# Patient Record
Sex: FEMALE | Race: WHITE | NOT HISPANIC OR LATINO | Employment: STUDENT | URBAN - METROPOLITAN AREA
[De-identification: names, ages, dates, MRNs, and addresses within clinical notes are randomized per-mention and may not be internally consistent; named-entity substitution may affect disease eponyms.]

---

## 2023-03-30 ENCOUNTER — OFFICE VISIT (OUTPATIENT)
Dept: OBGYN CLINIC | Facility: CLINIC | Age: 23
End: 2023-03-30

## 2023-03-30 VITALS
BODY MASS INDEX: 26.22 KG/M2 | DIASTOLIC BLOOD PRESSURE: 68 MMHG | WEIGHT: 148 LBS | SYSTOLIC BLOOD PRESSURE: 116 MMHG | HEIGHT: 63 IN

## 2023-03-30 DIAGNOSIS — Z01.419 GYNECOLOGIC EXAM NORMAL: Primary | ICD-10-CM

## 2023-03-30 PROBLEM — B96.89 BACTERIAL SINUSITIS: Status: RESOLVED | Noted: 2017-10-12 | Resolved: 2023-03-30

## 2023-03-30 PROBLEM — J02.9 ACUTE VIRAL PHARYNGITIS: Status: RESOLVED | Noted: 2019-08-30 | Resolved: 2023-03-30

## 2023-03-30 PROBLEM — J32.9 BACTERIAL SINUSITIS: Status: RESOLVED | Noted: 2017-10-12 | Resolved: 2023-03-30

## 2023-03-30 NOTE — ASSESSMENT & PLAN NOTE
Pap guidelines reviewed  Pap with reflex done today  Reviewed self breast exams today  Reviewed conception with patient in length  Recommend starting prenatal vitamin with folic acid 1-3 months prior to conception to decrease risk of neural tube defects such as spina bifida  Reviewed intercourse every other day around ovulation  Reviewed most couples take up to 6-9 months to conceive  If unable to conceive on own after 1 year of trying recommend returning to office for evaluation  Also recommend evaluation if menses continue to be irregular  Encouraged patient to take another pregnancy test with being a week late for menses  Reviewed endometriosis can run in families but reassured patient that she is not showing signs of endometriosis at this time  Menses are tolerable and only painful 1-2x's a year  Continue to monitor  Reviewed s/s to look out for  Return to office for annual or as needed

## 2023-04-03 ENCOUNTER — TELEPHONE (OUTPATIENT)
Dept: OBGYN CLINIC | Facility: CLINIC | Age: 23
End: 2023-04-03

## 2023-04-03 NOTE — TELEPHONE ENCOUNTER
4/3/2023 patient has an upcoming [address] appointment 4/26/23 12:00 nurse and 12:40 with Dr Efrain Bailon  This is patient's first pregnancy, she is a current patient of ours so no MR needed

## 2023-04-04 LAB
CYTOLOGIST CVX/VAG CYTO: ABNORMAL
DX ICD CODE: ABNORMAL
DX ICD CODE: ABNORMAL
OTHER STN SPEC: ABNORMAL
OTHER STN SPEC: ABNORMAL
PATH REPORT.FINAL DX SPEC: ABNORMAL
PATHOLOGIST CVX/VAG CYTO: ABNORMAL
SL AMB NOTE:: ABNORMAL
SL AMB SPECIMEN ADEQUACY: ABNORMAL
SL AMB TEST METHODOLOGY: ABNORMAL

## 2023-04-05 PROBLEM — R87.612 LGSIL OF CERVIX OF UNDETERMINED SIGNIFICANCE: Status: ACTIVE | Noted: 2023-04-05

## 2023-04-26 ENCOUNTER — INITIAL PRENATAL (OUTPATIENT)
Dept: OBGYN CLINIC | Facility: CLINIC | Age: 23
End: 2023-04-26

## 2023-04-26 VITALS
BODY MASS INDEX: 26.4 KG/M2 | HEIGHT: 63 IN | WEIGHT: 149 LBS | SYSTOLIC BLOOD PRESSURE: 118 MMHG | DIASTOLIC BLOOD PRESSURE: 70 MMHG

## 2023-04-26 DIAGNOSIS — Q24.9 MATERNAL CONGENITAL HEART DISEASE, ANTEPARTUM, FIRST TRIMESTER: ICD-10-CM

## 2023-04-26 DIAGNOSIS — Q24.9 MATERNAL CONGENITAL HEART DISEASE, ANTEPARTUM, FIRST TRIMESTER: Primary | ICD-10-CM

## 2023-04-26 DIAGNOSIS — Z36.89 ENCOUNTER FOR OTHER SPECIFIED ANTENATAL SCREENING: Primary | ICD-10-CM

## 2023-04-26 DIAGNOSIS — O99.411 MATERNAL CONGENITAL HEART DISEASE, ANTEPARTUM, FIRST TRIMESTER: ICD-10-CM

## 2023-04-26 DIAGNOSIS — R87.612 LGSIL OF CERVIX OF UNDETERMINED SIGNIFICANCE: ICD-10-CM

## 2023-04-26 DIAGNOSIS — Z36.87 UNSURE OF LMP (LAST MENSTRUAL PERIOD) AS REASON FOR ULTRASOUND SCAN: ICD-10-CM

## 2023-04-26 DIAGNOSIS — O99.411 MATERNAL CONGENITAL HEART DISEASE, ANTEPARTUM, FIRST TRIMESTER: Primary | ICD-10-CM

## 2023-04-26 DIAGNOSIS — Z3A.09 9 WEEKS GESTATION OF PREGNANCY: ICD-10-CM

## 2023-04-26 PROBLEM — Z01.419 GYNECOLOGIC EXAM NORMAL: Status: RESOLVED | Noted: 2023-03-30 | Resolved: 2023-04-26

## 2023-04-26 LAB
EXTERNAL CHLAMYDIA SCREEN: NEGATIVE
EXTERNAL GONORRHEA SCREEN: NEGATIVE
SL AMB  POCT GLUCOSE, UA: NEGATIVE
SL AMB POCT URINE PROTEIN: NEGATIVE

## 2023-04-26 NOTE — PROGRESS NOTES
Routine Prenatal Visit  [address] [facility], [address], [facility], [address] [facility]    Assessment/Plan:  Giuseppe Cat is a 20y o  year old  at Unknown who presents for routine prenatal visit  1  Maternal congenital heart disease, antepartum, first trimester    2  9 weeks gestation of pregnancy  Assessment & Plan:  U/S consistent with LMP  3  Unsure of LMP (last menstrual period) as reason for ultrasound scan  -     AMB US OB < 14 weeks single or first gestation level 1    4  LGSIL of cervix of undetermined significance  Assessment & Plan:  Discussed colposcopy recommendation  Explained that it is safe in pregnancy and offered to schedule  Should she decline will follow in the PP period  Subjective:     CC: Prenatal care    Raghavendra Richard is a 20 y o   female who presents for routine prenatal care at Unknown  Pregnancy ROS: no leakage of fluid, pelvic pain, or vaginal bleeding  no fetal movement  The following portions of the patient's history were reviewed and updated as appropriate: allergies, current medications, past family history, past medical history, obstetric history, gynecologic history, past social history, past surgical history and problem list       Objective:  LMP 2023 (Exact Date)   Pregravid Weight/BMI: 67 1 kg (148 lb) (BMI 26 22)  Current Weight:     Total Weight Gain: 0 454 kg (1 lb)        General: Well appearing, no distress  Respiratory: Unlabored breathing  Cardiovascular: Regular rate  Abdomen: Soft, gravid, nontender  Fundal Height: Appropriate for gestational age  Extremities: Warm and well perfused  Non tender

## 2023-04-26 NOTE — ASSESSMENT & PLAN NOTE
Discussed colposcopy recommendation  Explained that it is safe in pregnancy and offered to schedule  Should she decline will follow in the PP period

## 2023-04-26 NOTE — PROGRESS NOTES
OB INTAKE INTERVIEW  Patient is 20 y  o who presents for OB intake at 9wks  She is accompanied by: Spouse  The father of her baby Cuca Grigsby) is involved in the pregnancy    Last Menstrual Period: 2023  Ultrasound: Measured 9 weeks 0 days on 2023  Estimated Date of Delivery: 2023 confirmed by ultrasound  Signs/Symptoms of Pregnancy  Current pregnancy symptoms: Nausea, breast soreness, fatigue  Constipation no  Headaches no  Cramping/spotting no  PICA cravings no    Diabetes-  Body mass index is 26 39 kg/m²  If patient has 1 or more, please order early 1 hour GTT  History of GDM no  BMI >30 no  History of PCOS or current metformin use no  History of LGA/macrosomic infant (4000g/9lbs) no    If patient has 2 or more, please order early 1 hour GTT  AMA no  First degree relative with type 2 diabetes no  History of chronic HTN, hyperlipidemia, elevated A1C no  High risk race (, , ,  or ) no    Hypertension- if you answer yes, please order preeclampsia labs (cbc, comprehensive metabolic panel, urine protein creatinine ratio, uric acid)  History of of chronic HTN no  History of gestational HTN no  History of preeclampsia, eclampsia, or HELLP syndrome no  History of diabetes no  History of lupus, autoimmune disease, kidney disease, antiphospholipid syndrome, rheumatoid arthritis, sjogren's syndrome no    Thyroid- if yes order TSH with reflex T4 (Unless TSH value on file within last 4 weeks then do not need to order)  History of thyroid disease no    Bleeding Disorder or Hx of DVT-patient or first degree relative with history of  Order the following if not done previously     (Factor V, antithrombin III, prothrombin gene mutation, protein C and S Ag, lupus anticoagulant, anticardiolipin, beta-2 glycoprotein)   no    OB/GYN-  History of abnormal pap smear YES  History of HPV no  History of Herpes/HSV no  History of other STI (gonorrhea, chlamydia, trich) (or current partner with Hep B, Hep C, or HIV) no  History of prior  no  History of prior  no  History of  delivery prior to 36 weeks 6 days no  History of blood transfusion no  Ok for blood transfusion - Yes    Substance screening-   History of tobacco use no  Currently using tobacco no  Currently using alcohol no  Presently using drugs no  Past drug use (if yes, order urine drug screening panel)  no  IV drug use (if yes, order urine drug screening panel) no  Partner drug use (if yes, order urine drug screening panel) no  Parent/Family drug use (do not order urine drug screening panel just for family hx) no    Depression Screening-  PHQ-9 Score: (4)    MRSA Screening-   Does the pt have a hx of MRSA? no  If yes- please follow MRSA protocol and obtain a nasal swab for MRSA culture at 12 week visit  Immunizations:  Influenza vaccine given this season (ask date) -No  Discussed Tdap vaccine (ask date) -Yes  Discussed COVID Vaccine (request pt upload card or bring to next visit) -Yes, not vaccinated  Genetic/MFM-  Do you or your partner have a history of any of the following in yourselves or first degree relatives? Cystic fibrosis no  Spinal muscular atrophy no  Hemoglobinopathy/Sickle Cell/Thalassemia no  Fragile X Intellectual Disability no    If yes, discuss carrier screening and recommend consultation with MFM/genetic counseling  If no, discuss option for carrier screening and/or genetic testing with Nuchal Ultrasound  Patient interested -Patient interested in NIPT  Declined CF and SMA carrier screening     Appointment at [place] [id number] made Patient will call to schedule    Interview education  [facility] Pregnancy Essentials Book reviewed and discussed -Yes      Prenatal lab work scripts -Yes    Extra labs ordered:  -None     The patient has a history now or in prior pregnancy notable for:    None    Details that I feel the provider should be aware of: Ventricular septal defect The patient was oriented to our practice, reviewed delivering physicians and [place] in [facility]  All questions were answered      Interviewed by: Carmen Jama RN

## 2023-04-28 LAB
C TRACH RRNA SPEC QL NAA+PROBE: NEGATIVE
N GONORRHOEA RRNA SPEC QL NAA+PROBE: NEGATIVE

## 2023-05-01 LAB
EXTERNAL ANTIBODY SCREEN: NORMAL
EXTERNAL HEMATOCRIT: 44.9 %
EXTERNAL HEMOGLOBIN: 15.9 G/DL
EXTERNAL HEPATITIS B SURFACE ANTIGEN: NEGATIVE
EXTERNAL HIV-1/2 AB-AG: NORMAL
EXTERNAL PLATELET COUNT: 263 K/ÂΜL
EXTERNAL RH FACTOR: POSITIVE
EXTERNAL RUBELLA IGG QUANTITATION: NORMAL
EXTERNAL SYPHILIS TOTAL IGG/IGM SCREENING: NEGATIVE

## 2023-05-03 LAB
ABO GROUP BLD: ABNORMAL
APPEARANCE UR: CLEAR
BACTERIA UR CULT: ABNORMAL
BACTERIA UR CULT: NO GROWTH
BACTERIA URNS QL MICRO: NORMAL
BASOPHILS # BLD AUTO: 0.1 X10E3/UL (ref 0–0.2)
BASOPHILS NFR BLD AUTO: 1 %
BILIRUB UR QL STRIP: NEGATIVE
BLD GP AB SCN SERPL QL: NEGATIVE
C TRACH RRNA SPEC QL NAA+PROBE: NEGATIVE
CASTS URNS QL MICRO: NORMAL /LPF
COLOR UR: YELLOW
EOSINOPHIL # BLD AUTO: 0.1 X10E3/UL (ref 0–0.4)
EOSINOPHIL NFR BLD AUTO: 1 %
EPI CELLS #/AREA URNS HPF: NORMAL /HPF (ref 0–10)
ERYTHROCYTE [DISTWIDTH] IN BLOOD BY AUTOMATED COUNT: 12.9 % (ref 11.7–15.4)
GLUCOSE UR QL: NEGATIVE
HBV SURFACE AG SERPL QL IA: NEGATIVE
HCT VFR BLD AUTO: 37.4 % (ref 34–46.6)
HCV AB S/CO SERPL IA: NON REACTIVE
HGB BLD-MCNC: 12.9 G/DL (ref 11.1–15.9)
HGB UR QL STRIP: NEGATIVE
HIV 1+2 AB+HIV1 P24 AG SERPL QL IA: NON REACTIVE
IMM GRANULOCYTES # BLD: 0 X10E3/UL (ref 0–0.1)
IMM GRANULOCYTES NFR BLD: 0 %
KETONES UR QL STRIP: NEGATIVE
LEUKOCYTE ESTERASE UR QL STRIP: NEGATIVE
LYMPHOCYTES # BLD AUTO: 2.3 X10E3/UL (ref 0.7–3.1)
LYMPHOCYTES NFR BLD AUTO: 21 %
MCH RBC QN AUTO: 29.3 PG (ref 26.6–33)
MCHC RBC AUTO-ENTMCNC: 34.5 G/DL (ref 31.5–35.7)
MCV RBC AUTO: 85 FL (ref 79–97)
MICRO URNS: ABNORMAL
MICRO URNS: ABNORMAL
MONOCYTES # BLD AUTO: 0.6 X10E3/UL (ref 0.1–0.9)
MONOCYTES NFR BLD AUTO: 5 %
N GONORRHOEA RRNA SPEC QL NAA+PROBE: NEGATIVE
NEUTROPHILS # BLD AUTO: 8 X10E3/UL (ref 1.4–7)
NEUTROPHILS NFR BLD AUTO: 72 %
NITRITE UR QL STRIP: NEGATIVE
PH UR STRIP: 5.5 [PH] (ref 5–7.5)
PLATELET # BLD AUTO: 257 X10E3/UL (ref 150–450)
PROT UR QL STRIP: NEGATIVE
RBC # BLD AUTO: 4.4 X10E6/UL (ref 3.77–5.28)
RBC #/AREA URNS HPF: NORMAL /HPF (ref 0–2)
RH BLD: POSITIVE
RPR SER QL: NON REACTIVE
RUBV IGG SERPL IA-ACNC: 3.92 INDEX
SL AMB INTERPRETATION: NORMAL
SP GR UR: 1.02 (ref 1–1.03)
UROBILINOGEN UR STRIP-ACNC: 0.2 MG/DL (ref 0.2–1)
WBC # BLD AUTO: 11 X10E3/UL (ref 3.4–10.8)
WBC #/AREA URNS HPF: NORMAL /HPF (ref 0–5)

## 2023-05-22 ENCOUNTER — ROUTINE PRENATAL (OUTPATIENT)
Dept: PERINATAL CARE | Facility: OTHER | Age: 23
End: 2023-05-22

## 2023-05-22 VITALS
HEART RATE: 95 BPM | HEIGHT: 63 IN | DIASTOLIC BLOOD PRESSURE: 72 MMHG | BODY MASS INDEX: 25.48 KG/M2 | WEIGHT: 143.8 LBS | SYSTOLIC BLOOD PRESSURE: 124 MMHG

## 2023-05-22 DIAGNOSIS — O99.411 MATERNAL CONGENITAL HEART DISEASE, ANTEPARTUM, FIRST TRIMESTER: ICD-10-CM

## 2023-05-22 DIAGNOSIS — Z36.82 ENCOUNTER FOR ANTENATAL SCREENING FOR NUCHAL TRANSLUCENCY: Primary | ICD-10-CM

## 2023-05-22 DIAGNOSIS — Q24.9 MATERNAL CONGENITAL HEART DISEASE, ANTEPARTUM, FIRST TRIMESTER: ICD-10-CM

## 2023-05-22 DIAGNOSIS — Z36.89 ENCOUNTER FOR OTHER SPECIFIED ANTENATAL SCREENING: ICD-10-CM

## 2023-05-22 NOTE — LETTER
"Date: 2023    MD [address]  [facility]    Patient: Louis Skelton   YOB: 2000   Date of Visit: 2023   Gestational age 13w0d   Walt Badilloach of this communication: Routine       Dear Philippe Basilio,    This patient was seen recently in our  office  Please see ultrasound report under \"OB Procedures\" tab  Please don't hesitate to contact our office with any concerns or questions        Sincerely,      Balbir Siddiqi MD  Attending Physician, [facility]       "

## 2023-05-22 NOTE — PATIENT INSTRUCTIONS
Thank you for choosing us for your  care today  If you have any questions about your ultrasound or care, please do not hesitate to contact us or your primary obstetrician  Some general instructions for your pregnancy are:    Exercise: Aim for 22 minutes per day (150 minutes per week) of regular exercise  Walking is great! Nutrition: Choose healthy sources of calcium, iron, and protein  Learn about Preeclampsia: preeclampsia is a common, serious high blood pressure complication in pregnancy  A blood pressure of 456MYSF (systolic or top number) or 70TONW (diastolic or bottom number) is not normal and needs evaluation by your doctor  Aspirin is sometimes prescribed in early pregnancy to prevent preeclampsia in women with risk factors - ask your obstetrician if you should be on this medication  For more resources, visit:  MapCoverage fi  If you smoke, try to reduce how many cigarettes you smoke or try to quit completely  Do not vape  Other warning signs to watch out for in pregnancy or postpartum: chest pain, obstructed breathing or shortness of breath, seizures, thoughts of hurting yourself or your baby, bleeding, a painful or swollen leg, fever, or headache (see [facility] POST-BIRTH Warning Signs campaign)  If these happen call 911  Itching is also not normal in pregnancy and if you experience this, especially over your hands and feet, potentially worse at night, notify your doctors

## 2023-05-24 NOTE — PROGRESS NOTES
"[id number] [facility]: Ms Raissa Alba was seen today for nuchal translucency ultrasound  See ultrasound report under \"OB Procedures\" tab  Review of Systems   Constitutional: Negative for chills, fever and unexpected weight change  HENT: Positive for congestion  Negative for dental problem, facial swelling and sore throat  She attributes her congestion and cough to allergies   Eyes: Negative for visual disturbance  Respiratory: Positive for cough  Negative for shortness of breath  Cardiovascular: Negative for chest pain and palpitations  Gastrointestinal: Positive for vomiting  Negative for diarrhea  Endocrine: Negative for polydipsia  Genitourinary: Negative for dysuria and vaginal bleeding  Musculoskeletal: Negative for back pain and joint swelling  Skin: Negative for rash and wound  Allergic/Immunologic: Negative for immunocompromised state  Neurological: Negative for seizures and headaches  Hematological: Does not bruise/bleed easily  Psychiatric/Behavioral: Negative for hallucinations and suicidal ideas  Physical Exam    Please don't hesitate to contact our office with any concerns or questions    Elena Mcfarland MD      "

## 2023-05-25 ENCOUNTER — ROUTINE PRENATAL (OUTPATIENT)
Dept: OBGYN CLINIC | Facility: CLINIC | Age: 23
End: 2023-05-25

## 2023-05-25 VITALS
BODY MASS INDEX: 25.91 KG/M2 | WEIGHT: 146.2 LBS | DIASTOLIC BLOOD PRESSURE: 60 MMHG | SYSTOLIC BLOOD PRESSURE: 112 MMHG | HEIGHT: 63 IN

## 2023-05-25 DIAGNOSIS — Q24.9 MATERNAL CONGENITAL HEART DISEASE, ANTEPARTUM, FIRST TRIMESTER: Primary | ICD-10-CM

## 2023-05-25 DIAGNOSIS — O99.411 MATERNAL CONGENITAL HEART DISEASE, ANTEPARTUM, FIRST TRIMESTER: Primary | ICD-10-CM

## 2023-05-25 DIAGNOSIS — Z3A.13 13 WEEKS GESTATION OF PREGNANCY: ICD-10-CM

## 2023-05-25 LAB
SL AMB  POCT GLUCOSE, UA: NORMAL
SL AMB POCT URINE PROTEIN: NORMAL

## 2023-05-25 NOTE — ASSESSMENT & PLAN NOTE
Reviewed normal prenatal lab results  Reviewed AFP to evaluate for ONTD  Patient declines testing  Reviewed nausea, recommend small frequent meals, unisom, B6  Reviewed sinus congestion can consider allergy medication including Claritin, allegra, Zyrtec, reviewed saline rinse, humidifier, tylenol as needed  Reach out to PCP to evaluate for sinus infection if symptoms worsen or develops fever  Continue routine prenatal care  Return to office for ob check in 4 weeks

## 2023-05-25 NOTE — PROGRESS NOTES
"Routine Prenatal Visit  [address], [address], [facility], [address] [facility]    Assessment/Plan:  Kemar is a 20y o  year old  at 13w1d who presents for routine prenatal visit  1  Maternal congenital heart disease, antepartum, first trimester  Assessment & Plan:  Planning for fetal echo  2  13 weeks gestation of pregnancy  Assessment & Plan:  Reviewed normal prenatal lab results  Reviewed AFP to evaluate for ONTD  Patient declines testing  Reviewed nausea, recommend small frequent meals, unisom, B6  Reviewed sinus congestion can consider allergy medication including Claritin, allegra, Zyrtec, reviewed saline rinse, humidifier, tylenol as needed  Reach out to PCP to evaluate for sinus infection if symptoms worsen or develops fever  Continue routine prenatal care  Return to office for ob check in 4 weeks  Orders:  -     POCT urine dip      Next OB Visit 4 weeks  Subjective:     CC: Prenatal care    Perla Ibrahim is a 20 y o   female who presents for routine prenatal care at 13w1d  Pregnancy ROS: Reports nausea, currently has upper respiratory infection and feels as though the mucus is making it worse  No FM,  HA, cramping, VB, LOF, edema, domestic violence, or smoking  Tolerating PNV          The following portions of the patient's history were reviewed and updated as appropriate: allergies, current medications, past family history, past medical history, obstetric history, gynecologic history, past social history, past surgical history and problem list       Objective:  /60 (BP Location: Left arm, Patient Position: Sitting, Cuff Size: Standard)   Ht 5' 3\" (1 6 m)   Wt 66 3 kg (146 lb 3 2 oz)   LMP 2023 (Exact Date)   BMI 25 90 kg/m²   Pregravid Weight/BMI: 67 1 kg (148 lb) (BMI 26 22)  Current Weight: 66 3 kg (146 lb 3 2 oz)   Total Weight Gain: -0 816 kg (-1 lb 12 8 oz)   Pre- Vitals    Flowsheet Row Most Recent Value " Prenatal Assessment    Fetal Heart Rate 156   Fundal Height (cm) 13 cm   Movement Absent   Prenatal Vitals    Blood Pressure 112/60   Weight - Scale 66 3 kg (146 lb 3 2 oz)   Urine Albumin/Glucose    Dilation/Effacement/Station    Vaginal Drainage    Edema    LLE Edema None   RLE Edema None   Facial Edema None           General: Well appearing, no distress  Abdomen: Soft, gravid, nontender  Fundal Height: Appropriate for gestational age  Extremities: Warm and well perfused  Non tender

## 2023-06-12 ENCOUNTER — TELEPHONE (OUTPATIENT)
Dept: OBGYN CLINIC | Facility: CLINIC | Age: 23
End: 2023-06-12

## 2023-06-12 NOTE — TELEPHONE ENCOUNTER
Spoke with Dr Kaycee Alvarado regarding fall  No concern regarding pregnancy  Phone call returned to patient and advised of the same  Also advised that if pain is continuing in the left hip pt may want to go to an urgent care to get evaluated and make sure she did not do anything there  Pt verbalized understanding

## 2023-06-12 NOTE — TELEPHONE ENCOUNTER
Received call from pt who states that she fell yesterday while getting out of her car  States that she fell on her left hip  Did not hit stomach  Pain noted immediately at the hip  Pt is 15 wks pregnant  No other pain noted  No bleeding or spotting  On call provider in the OR  Message sent to call when available

## 2023-06-19 ENCOUNTER — ROUTINE PRENATAL (OUTPATIENT)
Dept: OBGYN CLINIC | Facility: CLINIC | Age: 23
End: 2023-06-19
Payer: COMMERCIAL

## 2023-06-19 VITALS
BODY MASS INDEX: 27.29 KG/M2 | WEIGHT: 154 LBS | SYSTOLIC BLOOD PRESSURE: 112 MMHG | HEIGHT: 63 IN | DIASTOLIC BLOOD PRESSURE: 62 MMHG

## 2023-06-19 DIAGNOSIS — Z36.1 ENCOUNTER FOR ANTENATAL SCREENING FOR HIGH ALPHA-FETOPROTEIN LEVEL: ICD-10-CM

## 2023-06-19 DIAGNOSIS — Z36.9 ANTENATAL SCREENING ENCOUNTER: ICD-10-CM

## 2023-06-19 DIAGNOSIS — O99.411 MATERNAL CONGENITAL HEART DISEASE, ANTEPARTUM, FIRST TRIMESTER: Primary | ICD-10-CM

## 2023-06-19 DIAGNOSIS — Z3A.16 16 WEEKS GESTATION OF PREGNANCY: ICD-10-CM

## 2023-06-19 DIAGNOSIS — Q24.9 MATERNAL CONGENITAL HEART DISEASE, ANTEPARTUM, FIRST TRIMESTER: Primary | ICD-10-CM

## 2023-06-19 LAB
SL AMB  POCT GLUCOSE, UA: NORMAL
SL AMB POCT URINE PROTEIN: NORMAL

## 2023-06-19 PROCEDURE — 81002 URINALYSIS NONAUTO W/O SCOPE: CPT | Performed by: OBSTETRICS & GYNECOLOGY

## 2023-06-19 PROCEDURE — PNV: Performed by: OBSTETRICS & GYNECOLOGY

## 2023-06-19 NOTE — PROGRESS NOTES
"Routine Prenatal Visit  [address] [facility], [address], [facility], [address] [facility]    Assessment/Plan:  Nona Rodriguez is a 20y o  year old  at 14w5d who presents for routine prenatal visit  1  Maternal congenital heart disease, antepartum, first trimester    2  16 weeks gestation of pregnancy  -     POCT urine dip    3   screening encounter  -     Alpha fetoprotein, maternal; Future  -     Alpha fetoprotein, maternal    4  Encounter for  screening for high alpha-fetoprotein level  -     Alpha fetoprotein, maternal; Future  -     Alpha fetoprotein, maternal      Labs reviewed, denies  Bleeding  Counseled on  msafp   Some Ha reviewed  Hydration   Subjective:     CC: Prenatal care    Poly Shahid is a 20 y o   female who presents for routine prenatal care at 16w5d  Pregnancy ROS: no  leakage of fluid, pelvic pain, or vaginal bleeding   +  fetal movement  The following portions of the patient's history were reviewed and updated as appropriate: allergies, current medications, past family history, past medical history, obstetric history, gynecologic history, past social history, past surgical history and problem list       Objective:  /62 (BP Location: Right arm, Patient Position: Sitting, Cuff Size: Standard)   Ht 5' 3\" (1 6 m)   Wt 69 9 kg (154 lb)   LMP 2023 (Exact Date)   BMI 27 28 kg/m²   Pregravid Weight/BMI: 67 1 kg (148 lb) (BMI 26 22)  Current Weight: 69 9 kg (154 lb)   Total Weight Gain: 2 722 kg (6 lb)   Pre- Vitals    Flowsheet Row Most Recent Value   Prenatal Assessment    Prenatal Vitals    Blood Pressure 112/62   Weight - Scale 69 9 kg (154 lb)   Urine Albumin/Glucose    Dilation/Effacement/Station    Vaginal Drainage    Edema            General: Well appearing, no distress  Respiratory: Unlabored breathing  Cardiovascular: Regular rate    Abdomen: Soft, gravid, nontender  Fundal Height: Appropriate for gestational " age   Extremities: Warm and well perfused  Non tender

## 2023-06-27 ENCOUNTER — TELEPHONE (OUTPATIENT)
Dept: OBGYN CLINIC | Facility: CLINIC | Age: 23
End: 2023-06-27

## 2023-06-27 DIAGNOSIS — Z36.1 ENCOUNTER FOR ANTENATAL SCREENING FOR HIGH ALPHA-FETOPROTEIN LEVEL: ICD-10-CM

## 2023-06-27 DIAGNOSIS — Z36.9 ANTENATAL SCREENING ENCOUNTER: Primary | ICD-10-CM

## 2023-06-27 NOTE — TELEPHONE ENCOUNTER
"Patient called regarding \"is egg from donor\" question on alpha-protein lab is incorrect  Re-ordered alpha protein and filled in all fields as previous order and changed \"is egg from donor\" to \"no\"  Spoke with patient to inform her new order is in and patient expressed verbal understanding      "

## 2023-06-30 LAB
2ND TRIMESTER 4 SCREEN SERPL-IMP: NORMAL
AFP ADJ MOM SERPL: 1.27
AFP INTERP AMN-IMP: NORMAL
AFP INTERP SERPL-IMP: NORMAL
AFP INTERP SERPL-IMP: NORMAL
AFP SERPL-MCNC: 56 NG/ML
AGE AT DELIVERY: 25.7 YR
GA METHOD: NORMAL
GA: 18 WEEKS
IDDM PATIENT QL: NO
MULTIPLE PREGNANCY: NO
NEURAL TUBE DEFECT RISK FETUS: 5251 %

## 2023-07-11 NOTE — PROGRESS NOTES
Please refer to the [facility] ultrasound report in Ob Procedures for additional information regarding today's visit

## 2023-07-12 ENCOUNTER — ROUTINE PRENATAL (OUTPATIENT)
Dept: PERINATAL CARE | Facility: OTHER | Age: 23
End: 2023-07-12
Payer: COMMERCIAL

## 2023-07-12 VITALS
HEIGHT: 63 IN | WEIGHT: 160.2 LBS | SYSTOLIC BLOOD PRESSURE: 124 MMHG | HEART RATE: 109 BPM | BODY MASS INDEX: 28.39 KG/M2 | DIASTOLIC BLOOD PRESSURE: 62 MMHG

## 2023-07-12 DIAGNOSIS — Q24.9 MATERNAL CONGENITAL HEART DISEASE, ANTEPARTUM, FIRST TRIMESTER: ICD-10-CM

## 2023-07-12 DIAGNOSIS — Z36.86 ENCOUNTER FOR ANTENATAL SCREENING FOR CERVICAL LENGTH: ICD-10-CM

## 2023-07-12 DIAGNOSIS — Z3A.20 20 WEEKS GESTATION OF PREGNANCY: ICD-10-CM

## 2023-07-12 DIAGNOSIS — O99.411 MATERNAL CONGENITAL HEART DISEASE, ANTEPARTUM, FIRST TRIMESTER: ICD-10-CM

## 2023-07-12 DIAGNOSIS — O35.2XX0 HEREDITARY FAMILIAL DISEASE AFFECTING MANAGEMENT OF MOTHER AND POSSIBLY AFFECTING FETUS, ANTEPARTUM, SINGLE OR UNSPECIFIED FETUS: Primary | ICD-10-CM

## 2023-07-12 PROCEDURE — 99213 OFFICE O/P EST LOW 20 MIN: CPT | Performed by: OBSTETRICS & GYNECOLOGY

## 2023-07-12 PROCEDURE — 76817 TRANSVAGINAL US OBSTETRIC: CPT | Performed by: OBSTETRICS & GYNECOLOGY

## 2023-07-12 PROCEDURE — 76811 OB US DETAILED SNGL FETUS: CPT | Performed by: OBSTETRICS & GYNECOLOGY

## 2023-07-12 NOTE — LETTER
July 12, 2023     Oni Jameson DO  [address] [facility] [address] [facility]    Patient: Franck Topete   YOB: 2000   Date of Visit: 7/12/2023       Dear Dr. Zuniga Ing: Thank you for referring Christina Quiroz to me for evaluation. Below are my notes for this consultation. If you have questions, please do not hesitate to call me. I look forward to following your patient along with you.          Sincerely,        Aram Erickson MD        CC: No Recipients    Aram Erickson MD  7/11/2023  7:43 AM  Sign when Signing Visit  Please refer to the [facility] ultrasound report in Ob Procedures for additional information regarding today's visit

## 2023-07-12 NOTE — PROGRESS NOTES
Ultrasound Probe Disinfection    A transvaginal ultrasound was performed. Prior to use, disinfection was performed with High Level Disinfection Process (Infinioon). Probe serial number [id number]: [id number] was used.       Bettie Metz  07/12/23  7:56 AM

## 2023-07-15 ENCOUNTER — CLINICAL SUPPORT (OUTPATIENT)
Age: 23
End: 2023-07-15

## 2023-07-15 DIAGNOSIS — Z32.2 ENCOUNTER FOR CHILDBIRTH INSTRUCTION: Primary | ICD-10-CM

## 2023-07-20 ENCOUNTER — ROUTINE PRENATAL (OUTPATIENT)
Dept: OBGYN CLINIC | Facility: CLINIC | Age: 23
End: 2023-07-20
Payer: COMMERCIAL

## 2023-07-20 VITALS
HEIGHT: 63 IN | BODY MASS INDEX: 28.53 KG/M2 | WEIGHT: 161 LBS | DIASTOLIC BLOOD PRESSURE: 64 MMHG | SYSTOLIC BLOOD PRESSURE: 112 MMHG

## 2023-07-20 DIAGNOSIS — Q24.9 MATERNAL CONGENITAL HEART DISEASE, ANTEPARTUM, FIRST TRIMESTER: ICD-10-CM

## 2023-07-20 DIAGNOSIS — O99.411 MATERNAL CONGENITAL HEART DISEASE, ANTEPARTUM, FIRST TRIMESTER: ICD-10-CM

## 2023-07-20 DIAGNOSIS — Z3A.21 21 WEEKS GESTATION OF PREGNANCY: Primary | ICD-10-CM

## 2023-07-20 LAB
SL AMB  POCT GLUCOSE, UA: NORMAL
SL AMB POCT URINE PROTEIN: NORMAL

## 2023-07-20 PROCEDURE — PNV: Performed by: OBSTETRICS & GYNECOLOGY

## 2023-07-20 PROCEDURE — 81002 URINALYSIS NONAUTO W/O SCOPE: CPT | Performed by: OBSTETRICS & GYNECOLOGY

## 2023-07-20 NOTE — PROGRESS NOTES
Routine Prenatal Visit  [address] [place] [address], [address], [facility], [address] [facility]    Assessment/Plan:  Giovanna Pruitt is a 20y.o. year old  at 20w2d who presents for routine prenatal visit. 1. 21 weeks gestation of pregnancy  -     POCT urine dip    2. Maternal congenital heart disease, antepartum, first trimester        Next OB Visit 4 weeks. Subjective:     CC: Prenatal care    Pati Garcia is a 20 y.o.  female who presents for routine prenatal care at 21w1d. Pregnancy ROS: no leakage of fluid, pelvic pain, or vaginal bleeding. normal fetal movement. The following portions of the patient's history were reviewed and updated as appropriate: allergies, current medications, past family history, past medical history, obstetric history, gynecologic history, past social history, past surgical history and problem list.      Objective:  /64 (BP Location: Left arm, Patient Position: Sitting, Cuff Size: Standard)   Ht 5' 3" (1.6 m)   Wt 73 kg (161 lb)   LMP 2023 (Exact Date)   BMI 28.52 kg/m²   Pregravid Weight/BMI: 67.1 kg (148 lb) (BMI 26.22)  Current Weight: 73 kg (161 lb)   Total Weight Gain: 5.897 kg (13 lb)   Pre-Apollo Vitals    Flowsheet Row Most Recent Value   Prenatal Assessment    Fetal Heart Rate 150   Fundal Height (cm) 21 cm   Movement Absent   Prenatal Vitals    Blood Pressure 112/64   Weight - Scale 73 kg (161 lb)   Urine Albumin/Glucose    Dilation/Effacement/Station    Vaginal Drainage    Draining Fluid No   Edema            General: Well appearing, no distress  Abdomen: Soft, gravid, nontender  Extremities: Non tender.

## 2023-07-25 ENCOUNTER — TELEPHONE (OUTPATIENT)
Dept: PERINATAL CARE | Facility: CLINIC | Age: 23
End: 2023-07-25

## 2023-07-25 NOTE — TELEPHONE ENCOUNTER
S/w Aury Ontiveros and confirmed appointment time change for [facility] [place] on 10/04/23 arrival is now 9:30 for 9:45 ultrasound.   Patient verbalized understanding and is aware new appointment in [address] [place] [address]

## 2023-08-09 NOTE — PROGRESS NOTES
Please refer to the [facility] ultrasound report in Ob Procedures for additional information regarding today's visit

## 2023-08-10 ENCOUNTER — ROUTINE PRENATAL (OUTPATIENT)
Dept: PERINATAL CARE | Facility: OTHER | Age: 23
End: 2023-08-10
Payer: COMMERCIAL

## 2023-08-10 VITALS
DIASTOLIC BLOOD PRESSURE: 58 MMHG | WEIGHT: 165.4 LBS | HEART RATE: 92 BPM | HEIGHT: 63 IN | SYSTOLIC BLOOD PRESSURE: 114 MMHG | BODY MASS INDEX: 29.3 KG/M2

## 2023-08-10 DIAGNOSIS — Z36.89 ENCOUNTER FOR FETAL ANATOMIC SURVEY: ICD-10-CM

## 2023-08-10 DIAGNOSIS — Z3A.24 24 WEEKS GESTATION OF PREGNANCY: Primary | ICD-10-CM

## 2023-08-10 DIAGNOSIS — O35.2XX0 HEREDITARY FAMILIAL DISEASE AFFECTING MANAGEMENT OF MOTHER AND POSSIBLY AFFECTING FETUS, ANTEPARTUM, SINGLE OR UNSPECIFIED FETUS: ICD-10-CM

## 2023-08-10 PROCEDURE — 76816 OB US FOLLOW-UP PER FETUS: CPT | Performed by: OBSTETRICS & GYNECOLOGY

## 2023-08-10 PROCEDURE — 76827 ECHO EXAM OF FETAL HEART: CPT | Performed by: OBSTETRICS & GYNECOLOGY

## 2023-08-10 PROCEDURE — 76825 ECHO EXAM OF FETAL HEART: CPT | Performed by: OBSTETRICS & GYNECOLOGY

## 2023-08-10 PROCEDURE — 93325 DOPPLER ECHO COLOR FLOW MAPG: CPT | Performed by: OBSTETRICS & GYNECOLOGY

## 2023-08-10 NOTE — LETTER
August 10, 2023     Rosie Cuello, [address] [place], [place] [address]y  [id number]    Patient: Enrique Watson   YOB: 2000   Date of Visit: 8/10/2023       Dear Dr. Donne Landau: Thank you for referring Isi Nieves to me for evaluation. Below are my notes for this consultation. If you have questions, please do not hesitate to call me. I look forward to following your patient along with you.          Sincerely,        Donaldo Love MD        CC: No Recipients    Donaldo Love MD  8/9/2023  2:34 PM  Sign when Signing Visit  Please refer to the [facility] ultrasound report in Ob Procedures for additional information regarding today's visit

## 2023-08-18 ENCOUNTER — ROUTINE PRENATAL (OUTPATIENT)
Dept: OBGYN CLINIC | Facility: CLINIC | Age: 23
End: 2023-08-18
Payer: COMMERCIAL

## 2023-08-18 VITALS
BODY MASS INDEX: 29.77 KG/M2 | HEIGHT: 63 IN | WEIGHT: 168 LBS | DIASTOLIC BLOOD PRESSURE: 58 MMHG | SYSTOLIC BLOOD PRESSURE: 108 MMHG

## 2023-08-18 DIAGNOSIS — O99.412 MATERNAL CONGENITAL HEART DISEASE IN SECOND TRIMESTER, ANTEPARTUM: ICD-10-CM

## 2023-08-18 DIAGNOSIS — Z36.89 ENCOUNTER FOR OTHER SPECIFIED ANTENATAL SCREENING: ICD-10-CM

## 2023-08-18 DIAGNOSIS — Q24.9 MATERNAL CONGENITAL HEART DISEASE IN SECOND TRIMESTER, ANTEPARTUM: ICD-10-CM

## 2023-08-18 DIAGNOSIS — Z3A.25 25 WEEKS GESTATION OF PREGNANCY: Primary | ICD-10-CM

## 2023-08-18 LAB
SL AMB  POCT GLUCOSE, UA: NEGATIVE
SL AMB POCT URINE PROTEIN: NEGATIVE

## 2023-08-18 PROCEDURE — 81002 URINALYSIS NONAUTO W/O SCOPE: CPT | Performed by: OBSTETRICS & GYNECOLOGY

## 2023-08-18 PROCEDURE — PNV: Performed by: OBSTETRICS & GYNECOLOGY

## 2023-08-18 NOTE — PATIENT INSTRUCTIONS
NUTRITION IN PREGNANCY  Good Nutrition is a VERY important part of having a healthy pregnancy and healthy baby. You should follow a healthy diet which include the following: * Vegetables (which are dark green and leafy): at least 2 servings each day   * Protein (meat, eggs, beans, nuts, peanut butter): 3-4 servings each day   * Breads/whole grains (bread, pasta, rice, tortillas, potatoes): 3 servings each day   * Dairy (milk, yogurt, cheese): 3-4 servings each day   * Water: 6-8 glasses per day   * Calories: approximately 2000 to 2200 calories per day     WEIGHT GAIN   Recommended weight gain for you during your pregnancy is based on your body mass index (BMI) at the time that you became pregnant. Pre-pregnant BMI Recommended weight gain   Underweight (BMI less than 18.5) 28 to 40 pounds   Normal weight (BMI 18.5-24.9) 25 to 35 pounds   Overweight (BMI 25-29.9) 15 to 25 pounds   Obese (BMI 30 or greater) 11 to 20 pounds     FOOD SAFETY   It is VERY important to eat only safely-prepared foods during pregnancy as you and your baby have a higher risk than usual for being affected by foodborne illnesses.   Follow these steps to ensure that you and your baby are safe from foodborne illnesses while you are pregnant:   wash hands thoroughly with warm water and soap before and after handling any foods   wash cutting boards, dishes, utensils, and countertops with hot water and soap before and after preparing any foods   rinse raw fruits and vegetables thoroughly under running water before eating   keep raw meat and seafood separate from other foods and use different cutting boards/utensils to handle raw meat than for other foods   put cooked food on a freshly clean plate   cook all of your foods thoroughly   discard foods that have been left out for more than 2 hours   refrigerate or freeze any foods than can spoil     There are three particular foodborne risks that you should be aware of and avoid as they can cause serious harm to your unborn child. * Listeria (a harmful bacteria)   don’t eat hot dogs or deli meats (unless they’re reheated until steaming hot)   don’t eat soft cheeses (such as Feta, Brie, Camembert) unless they are specifically labeled as being “made with pasteurized milk”   don’t drink raw (unpasteurized) milk   don’t eat refrigerated pates or meat spreads   don’t eat refrigerated smoked seafood unless it’s in a cooked dish like a casserole     * Mercury (a metal which is found in certain fish in high levels)   don’t eat shark, tilefish, carol mackerel, or swordfish   don’t eat more than 12 ounces per week of shrimp, salmon, pollock, or catfish   when eating tuna fish, you can have up to 6 ounces per week of canned albacore tuna OR up to 12 ounces of canned light tuna     * Toxoplasma (a harmful parasite)   cook meat thoroughly before eating   wear gloves when gardening or handling sand from a child’s sandbox   if you ha tve a cat, have someone else change the litter box while you are pregnant.     if you HAVE to clean it yourself, be sure to wash your hands thoroughly afterwards with warm water and soap.   don’t get a NEW cat while you are pregnant

## 2023-08-18 NOTE — PROGRESS NOTES
Routine Prenatal Visit  [address] [place] [address], [address], [facility], [address] [facility],8W    Assessment/Plan:  Pollo Jones is a 20y.o. year old  at 25w2d who presents for routine prenatal visit. 1. 25 weeks gestation of pregnancy  -     POCT urine dip    2. Encounter for other specified  screening  -     Glucose, 1H PG; Future  -     CBC; Future  -     RPR, Rfx Qn RPR/Confirm TP; Future  -     Glucose, 1H PG  -     CBC  -     RPR, Rfx Qn RPR/Confirm TP    3. Maternal congenital heart disease in second trimester, antepartum      + fm  No UTCX  No leaking of fluid. Subjective:     CC: Prenatal care    Aleshia Miramontes is a 20 y.o.  female who presents for routine prenatal care at 25w2d. Pregnancy ROS: no  leakage of fluid, pelvic pain, or vaginal bleeding.  + fetal movement. The following portions of the patient's history were reviewed and updated as appropriate: allergies, current medications, past family history, past medical history, obstetric history, gynecologic history, past social history, past surgical history and problem list.      Objective:  /58   Ht 5' 3" (1.6 m)   Wt 76.2 kg (168 lb)   LMP 2023 (Exact Date)   BMI 29.76 kg/m²   Pregravid Weight/BMI: 67.1 kg (148 lb) (BMI 26.22)  Current Weight: 76.2 kg (168 lb)   Total Weight Gain: 9.072 kg (20 lb)   Pre- Vitals    Flowsheet Row Most Recent Value   Prenatal Assessment    Fetal Heart Rate 132-145   Fundal Height (cm) 25 cm   Movement Present   Prenatal Vitals    Blood Pressure 108/58   Weight - Scale 76.2 kg (168 lb)   Urine Albumin/Glucose    Dilation/Effacement/Station    Vaginal Drainage    Draining Fluid No   Edema    LLE Edema Trace   RLE Edema Trace   Facial Edema None           General: Well appearing, no distress  Respiratory: Unlabored breathing  Cardiovascular: Regular rate. Abdomen: Soft, gravid, nontender  Fundal Height: Appropriate for gestational age.   Extremities: Warm and well perfused. Non tender.

## 2023-09-05 ENCOUNTER — CLINICAL SUPPORT (OUTPATIENT)
Dept: POSTPARTUM | Facility: CLINIC | Age: 23
End: 2023-09-05

## 2023-09-05 DIAGNOSIS — Z32.2 ENCOUNTER FOR CHILDBIRTH INSTRUCTION: Primary | ICD-10-CM

## 2023-09-08 ENCOUNTER — ROUTINE PRENATAL (OUTPATIENT)
Dept: OBGYN CLINIC | Facility: CLINIC | Age: 23
End: 2023-09-08
Payer: COMMERCIAL

## 2023-09-08 VITALS
SYSTOLIC BLOOD PRESSURE: 110 MMHG | WEIGHT: 170 LBS | DIASTOLIC BLOOD PRESSURE: 66 MMHG | HEIGHT: 63 IN | BODY MASS INDEX: 30.12 KG/M2

## 2023-09-08 DIAGNOSIS — Z3A.28 28 WEEKS GESTATION OF PREGNANCY: ICD-10-CM

## 2023-09-08 DIAGNOSIS — O99.419 MATERNAL CONGENITAL HEART DISEASE, ANTEPARTUM: Primary | ICD-10-CM

## 2023-09-08 DIAGNOSIS — Q24.9 MATERNAL CONGENITAL HEART DISEASE, ANTEPARTUM: Primary | ICD-10-CM

## 2023-09-08 DIAGNOSIS — Z23 NEED FOR TDAP VACCINATION: ICD-10-CM

## 2023-09-08 LAB
SL AMB  POCT GLUCOSE, UA: NORMAL
SL AMB POCT URINE PROTEIN: NORMAL

## 2023-09-08 PROCEDURE — PNV: Performed by: STUDENT IN AN ORGANIZED HEALTH CARE EDUCATION/TRAINING PROGRAM

## 2023-09-08 PROCEDURE — 81002 URINALYSIS NONAUTO W/O SCOPE: CPT | Performed by: STUDENT IN AN ORGANIZED HEALTH CARE EDUCATION/TRAINING PROGRAM

## 2023-09-08 PROCEDURE — 90471 IMMUNIZATION ADMIN: CPT | Performed by: STUDENT IN AN ORGANIZED HEALTH CARE EDUCATION/TRAINING PROGRAM

## 2023-09-08 PROCEDURE — 90715 TDAP VACCINE 7 YRS/> IM: CPT | Performed by: STUDENT IN AN ORGANIZED HEALTH CARE EDUCATION/TRAINING PROGRAM

## 2023-09-08 NOTE — PROGRESS NOTES
Routine Prenatal Visit  [address] [place] [address], [address], [facility], [address] [facility],8W    Assessment/Plan:  Deidre Ram is a 20y.o. year old  at 28w2d who presents for routine prenatal visit. 1. Maternal congenital heart disease, antepartum  Assessment & Plan:  - Normal fetal echo this pregnancy. 2. 28 weeks gestation of pregnancy  Assessment & Plan:  - PTL/PPROM/Bleeding precautions given. Kick counts reviewed  - Third trimester labs not yet drawn. Patient reports that she has an appointment scheduled with the lab on Tuesday.  - Recommendation for administration of TDaP was reviewed. TDaP administered today. - Problem list updated, results console reviewed and updated with pertinent prenatal labs. - PMH, PSH, medications reviewed and updated as needed  - Return to office in 2 wk(s) for routine prenatal care      Orders:  -     POCT urine dip    3. Need for Tdap vaccination  -     TDAP VACCINE GREATER THAN OR EQUAL TO 6YO IM          Subjective:   Thalia Breaux is a 20 y.o. Zuhair Crespo who presents for routine prenatal care at 28w2d. Complaints today: No  LOF: No; VB: No; Contractions: No; FM: Present and normal    Objective:  /66 (BP Location: Left arm, Patient Position: Sitting, Cuff Size: Standard)   Ht 5' 3" (1.6 m)   Wt 77.1 kg (170 lb)   LMP 2023 (Exact Date)   BMI 30.11 kg/m²     General: Well appearing, no distress  Respiratory: Unlabored breathing  Cardiovascular: Regular rate. Abdomen: Soft, gravid, nontender  Extremities: Warm and well perfused. Non tender.     Pregravid Weight/BMI: 67.1 kg (148 lb) (BMI 26.22)  Current Weight: 77.1 kg (170 lb)   Total Weight Gain: 9.979 kg (22 lb)     Pre-Paollo Vitals    Flowsheet Row Most Recent Value   Prenatal Assessment    Fetal Heart Rate 155   Fundal Height (cm) 28 cm   Movement Present   Prenatal Vitals    Blood Pressure 110/66   Weight - Scale 77.1 kg (170 lb)   Urine Albumin/Glucose    Dilation/Effacement/Station Vaginal Drainage    Draining Fluid No   Edema    LLE Edema None   RLE Edema None   Facial Edema None           Jose Catalan MD  9/8/2023 3:14 PM

## 2023-09-08 NOTE — ASSESSMENT & PLAN NOTE
- PTL/PPROM/Bleeding precautions given. Kick counts reviewed  - Third trimester labs not yet drawn. Patient reports that she has an appointment scheduled with the lab on Tuesday.  - Recommendation for administration of TDaP was reviewed. TDaP administered today. - Problem list updated, results console reviewed and updated with pertinent prenatal labs.   - PMH, PSH, medications reviewed and updated as needed  - Return to office in 2 wk(s) for routine prenatal care

## 2023-09-12 ENCOUNTER — CLINICAL SUPPORT (OUTPATIENT)
Dept: POSTPARTUM | Facility: CLINIC | Age: 23
End: 2023-09-12

## 2023-09-12 DIAGNOSIS — Z32.2 ENCOUNTER FOR CHILDBIRTH INSTRUCTION: Primary | ICD-10-CM

## 2023-09-13 ENCOUNTER — CLINICAL SUPPORT (OUTPATIENT)
Dept: POSTPARTUM | Facility: CLINIC | Age: 23
End: 2023-09-13

## 2023-09-13 DIAGNOSIS — Z32.2 ENCOUNTER FOR CHILDBIRTH INSTRUCTION: Primary | ICD-10-CM

## 2023-09-13 LAB
ERYTHROCYTE [DISTWIDTH] IN BLOOD BY AUTOMATED COUNT: 12.7 % (ref 11.7–15.4)
GLUCOSE 1H P 50 G GLC PO SERPL-MCNC: 140 MG/DL (ref 70–139)
HCT VFR BLD AUTO: 34.1 % (ref 34–46.6)
HGB BLD-MCNC: 11.2 G/DL (ref 11.1–15.9)
MCH RBC QN AUTO: 28.6 PG (ref 26.6–33)
MCHC RBC AUTO-ENTMCNC: 32.8 G/DL (ref 31.5–35.7)
MCV RBC AUTO: 87 FL (ref 79–97)
PLATELET # BLD AUTO: 205 X10E3/UL (ref 150–450)
RBC # BLD AUTO: 3.92 X10E6/UL (ref 3.77–5.28)
RPR SER QL: NON REACTIVE
WBC # BLD AUTO: 10.4 X10E3/UL (ref 3.4–10.8)

## 2023-09-14 ENCOUNTER — TELEPHONE (OUTPATIENT)
Dept: OBGYN CLINIC | Facility: CLINIC | Age: 23
End: 2023-09-14

## 2023-09-14 DIAGNOSIS — O99.810 ABNORMAL MATERNAL GLUCOSE TOLERANCE, ANTEPARTUM: Primary | ICD-10-CM

## 2023-09-14 NOTE — TELEPHONE ENCOUNTER
Estefani Haas called to confirm she can go to [facility] for 3 hr gtt.  Advised orders provided for [facility] Writer was unable to assess as patient refused to engage in session. General Sunscreen Counseling: I recommended a cream broad spectrum sunscreen with a SPF of 30 or higher.  I explained that SPF 30 sunscreens block approximately 97 percent of the sun's harmful rays.  Sunscreens should be applied at least 15 minutes prior to expected sun exposure and then every 2 hours after that as long as sun exposure continues. If swimming or exercising sunscreen should be reapplied every 45 minutes to an hour after getting wet or sweating.  One ounce, or the equivalent of a shot glass full of sunscreen, is adequate to protect the skin not covered by a bathing suit. I also recommended a lip balm with a sunscreen as well. Sun protective clothing can be used in lieu of sunscreen but must be worn the entire time you are exposed to the sun's rays. Products Recommended: SPF 40-50 cream sunscreen Detail Level: Detailed

## 2023-09-14 NOTE — TELEPHONE ENCOUNTER
Pt called with additional questions regarding need to have 3hr gtt. Pt is scheduled to have testing done on 9/16/23, recommended to avoid a diet high in carbohydrates and sugar, testing requires her to fast, testing will take 3 hours and bring a snack for after test. Pt had questions regarding  GDM and etiology , questions addressed. Pt will  a copy of her order, [place] office, [place] Ends, will print a coipy and leave at the .

## 2023-09-15 LAB
EXTERNAL GTT 2 HOUR: 131
GLUCOSE 1H P GLC SERPL-MCNC: 151 MG/DL

## 2023-09-16 LAB
GLUCOSE 1H P 100 G GLC PO SERPL-MCNC: 151 MG/DL (ref 70–179)
GLUCOSE 2H P 100 G GLC PO SERPL-MCNC: 131 MG/DL (ref 70–154)
GLUCOSE 3H P 100 G GLC PO SERPL-MCNC: 121 MG/DL (ref 70–139)
GLUCOSE P FAST SERPL-MCNC: 94 MG/DL (ref 70–94)
SL AMB NOTE:: NORMAL

## 2023-09-19 ENCOUNTER — CLINICAL SUPPORT (OUTPATIENT)
Dept: POSTPARTUM | Facility: CLINIC | Age: 23
End: 2023-09-19

## 2023-09-19 DIAGNOSIS — Z32.2 ENCOUNTER FOR CHILDBIRTH INSTRUCTION: Primary | ICD-10-CM

## 2023-09-21 ENCOUNTER — ROUTINE PRENATAL (OUTPATIENT)
Dept: OBGYN CLINIC | Facility: CLINIC | Age: 23
End: 2023-09-21
Payer: COMMERCIAL

## 2023-09-21 VITALS — SYSTOLIC BLOOD PRESSURE: 102 MMHG | DIASTOLIC BLOOD PRESSURE: 56 MMHG | BODY MASS INDEX: 30.11 KG/M2 | WEIGHT: 170 LBS

## 2023-09-21 DIAGNOSIS — Q24.9 MATERNAL CONGENITAL HEART DISEASE, ANTEPARTUM: Primary | ICD-10-CM

## 2023-09-21 DIAGNOSIS — Z3A.30 30 WEEKS GESTATION OF PREGNANCY: ICD-10-CM

## 2023-09-21 DIAGNOSIS — O99.419 MATERNAL CONGENITAL HEART DISEASE, ANTEPARTUM: Primary | ICD-10-CM

## 2023-09-21 LAB
SL AMB  POCT GLUCOSE, UA: NEGATIVE
SL AMB POCT URINE PROTEIN: NEGATIVE

## 2023-09-21 PROCEDURE — 81002 URINALYSIS NONAUTO W/O SCOPE: CPT | Performed by: PHYSICIAN ASSISTANT

## 2023-09-21 PROCEDURE — PNV: Performed by: PHYSICIAN ASSISTANT

## 2023-09-21 NOTE — PROGRESS NOTES
Routine Prenatal Visit  [address] [place] [address], [address], [facility], [address] [facility],8W    Assessment/Plan:  Estefani Haas is a 20y.o. year old  at 30w1d who presents for routine prenatal visit. 1. Maternal congenital heart disease, antepartum    2. 30 weeks gestation of pregnancy  Assessment & Plan:  Continue routine prenatal care. Return to office for ob check in 2 weeks. Orders:  -     POCT urine dip    3. Care and examination of lactating mother  -     Breast pump      Next OB Visit 2 weeks. Subjective:     CC: Prenatal care    Emory Parr is a 20 y.o.  female who presents for routine prenatal care at 30w1d. Pregnancy ROS: Good Fm, No N/V, HA, cramping, VB, LOF, edema, domestic violence, or smoking. Tolerating PNV. The following portions of the patient's history were reviewed and updated as appropriate: allergies, current medications, past family history, past medical history, obstetric history, gynecologic history, past social history, past surgical history and problem list.      Objective:  /56   Wt 77.1 kg (170 lb)   LMP 2023 (Exact Date)   BMI 30.11 kg/m²   Pregravid Weight/BMI: 67.1 kg (148 lb) (BMI 26.22)  Current Weight: 77.1 kg (170 lb)   Total Weight Gain: 9.979 kg (22 lb)   Pre- Vitals    Flowsheet Row Most Recent Value   Prenatal Assessment    Fetal Heart Rate 155   Fundal Height (cm) 30 cm   Movement Present   Prenatal Vitals    Blood Pressure 102/56   Weight - Scale 77.1 kg (170 lb)   Urine Albumin/Glucose    Dilation/Effacement/Station    Vaginal Drainage    Draining Fluid No   Edema    LLE Edema None   RLE Edema None   Facial Edema None           General: Well appearing, no distress  Abdomen: Soft, gravid, nontender  Fundal Height: Appropriate for gestational age. Extremities: Warm and well perfused. Non tender.

## 2023-10-04 ENCOUNTER — ULTRASOUND (OUTPATIENT)
Dept: PERINATAL CARE | Facility: OTHER | Age: 23
End: 2023-10-04
Payer: COMMERCIAL

## 2023-10-04 VITALS
HEART RATE: 99 BPM | HEIGHT: 63 IN | DIASTOLIC BLOOD PRESSURE: 66 MMHG | WEIGHT: 172.8 LBS | BODY MASS INDEX: 30.62 KG/M2 | SYSTOLIC BLOOD PRESSURE: 120 MMHG

## 2023-10-04 DIAGNOSIS — Z3A.32 32 WEEKS GESTATION OF PREGNANCY: ICD-10-CM

## 2023-10-04 DIAGNOSIS — O99.419 MATERNAL CONGENITAL HEART DISEASE, ANTEPARTUM: Primary | ICD-10-CM

## 2023-10-04 DIAGNOSIS — Q24.9 MATERNAL CONGENITAL HEART DISEASE, ANTEPARTUM: Primary | ICD-10-CM

## 2023-10-04 PROCEDURE — 76816 OB US FOLLOW-UP PER FETUS: CPT | Performed by: OBSTETRICS & GYNECOLOGY

## 2023-10-04 PROCEDURE — 99212 OFFICE O/P EST SF 10 MIN: CPT | Performed by: OBSTETRICS & GYNECOLOGY

## 2023-10-04 NOTE — PROGRESS NOTES
The patient was seen today for an ultrasound. Please see ultrasound report (located under Ob Procedures) for additional details. Thank you very much for allowing us to participate in the care of this very nice patient. Should you have any questions, please do not hesitate to contact me. Andry Gardner [place] [id number] [facility]  Attending Physician, [address] [place] [address]

## 2023-10-05 ENCOUNTER — ROUTINE PRENATAL (OUTPATIENT)
Dept: OBGYN CLINIC | Facility: CLINIC | Age: 23
End: 2023-10-05

## 2023-10-05 VITALS
HEIGHT: 63 IN | WEIGHT: 172.6 LBS | BODY MASS INDEX: 30.58 KG/M2 | DIASTOLIC BLOOD PRESSURE: 64 MMHG | SYSTOLIC BLOOD PRESSURE: 100 MMHG

## 2023-10-05 DIAGNOSIS — Z3A.32 32 WEEKS GESTATION OF PREGNANCY: ICD-10-CM

## 2023-10-05 DIAGNOSIS — O99.810 ABNORMAL MATERNAL GLUCOSE TOLERANCE, ANTEPARTUM: ICD-10-CM

## 2023-10-05 DIAGNOSIS — Q24.9 MATERNAL CONGENITAL HEART DISEASE, ANTEPARTUM: Primary | ICD-10-CM

## 2023-10-05 DIAGNOSIS — O99.419 MATERNAL CONGENITAL HEART DISEASE, ANTEPARTUM: Primary | ICD-10-CM

## 2023-10-05 PROCEDURE — PNV: Performed by: OBSTETRICS & GYNECOLOGY

## 2023-10-05 RX ORDER — CALCIUM CARBONATE 500 MG/1
2 TABLET, CHEWABLE ORAL DAILY
COMMUNITY

## 2023-10-05 NOTE — PROGRESS NOTES
Routine Prenatal Visit  [address]  [address] [facility], [address], [place], [address] [facility]    Assessment/Plan:  Faustino Cruz is a 20y.o. year old  at 32w1d who presents for routine prenatal visit. 1. Maternal congenital heart disease, antepartum  Comments:  normal fetal echo      2. 32 weeks gestation of pregnancy    3. Abnormal maternal glucose tolerance, antepartum  Comments:  9/15 normal  3 hour        + fm   No utcx ,   US reviewed  AGA,  Normal  3 hour  Glucose . Subjective:     CC: Prenatal care    Janel Sands is a 20 y.o.  female who presents for routine prenatal care at [address] [facility]. Pregnancy ROS: no  leakage of fluid, pelvic pain, or vaginal bleeding.  +  fetal movement. The following portions of the patient's history were reviewed and updated as appropriate: allergies, current medications, past family history, past medical history, obstetric history, gynecologic history, past social history, past surgical history and problem list.      Objective:  /64   Ht 5' 3" (1.6 m)   Wt 78.3 kg (172 lb 9.6 oz)   LMP 2023 (Exact Date)   BMI 30.57 kg/m²   Pregravid Weight/BMI: 67.1 kg (148 lb) (BMI 26.22)  Current Weight: 78.3 kg (172 lb 9.6 oz)   Total Weight Gain: 11.2 kg (24 lb 9.6 oz)   Pre- Vitals    Flowsheet Row Most Recent Value   Prenatal Assessment    Fetal Heart Rate 142-154   Fundal Height (cm) 33 cm   Movement Present   Prenatal Vitals    Blood Pressure 100/64   Weight - Scale 78.3 kg (172 lb 9.6 oz)   Urine Albumin/Glucose    Dilation/Effacement/Station    Vaginal Drainage    Draining Fluid No   Edema    LLE Edema Trace   RLE Edema Trace   Facial Edema None           General: Well appearing, no distress  Respiratory: Unlabored breathing  Cardiovascular: Regular rate. Abdomen: Soft, gravid, nontender  Fundal Height: Appropriate for gestational age. Extremities: Warm and well perfused. Non tender.

## 2023-10-14 ENCOUNTER — CLINICAL SUPPORT (OUTPATIENT)
Age: 23
End: 2023-10-14

## 2023-10-14 DIAGNOSIS — Z32.2 ENCOUNTER FOR CHILDBIRTH INSTRUCTION: Primary | ICD-10-CM

## 2023-10-18 ENCOUNTER — ROUTINE PRENATAL (OUTPATIENT)
Dept: OBGYN CLINIC | Facility: CLINIC | Age: 23
End: 2023-10-18

## 2023-10-18 VITALS
HEIGHT: 63 IN | BODY MASS INDEX: 32 KG/M2 | DIASTOLIC BLOOD PRESSURE: 58 MMHG | SYSTOLIC BLOOD PRESSURE: 120 MMHG | WEIGHT: 180.6 LBS

## 2023-10-18 DIAGNOSIS — Z3A.34 34 WEEKS GESTATION OF PREGNANCY: ICD-10-CM

## 2023-10-18 DIAGNOSIS — O99.419 MATERNAL CONGENITAL HEART DISEASE, ANTEPARTUM: Primary | ICD-10-CM

## 2023-10-18 DIAGNOSIS — Q24.9 MATERNAL CONGENITAL HEART DISEASE, ANTEPARTUM: Primary | ICD-10-CM

## 2023-10-18 PROCEDURE — PNV: Performed by: OBSTETRICS & GYNECOLOGY

## 2023-10-18 NOTE — PROGRESS NOTES
Routine Prenatal Visit  [address]  800 [facility], [address], [place], [address] [facility]    Assessment/Plan:  Carlita Briones is a 20y.o. year old  at 34w0d who presents for routine prenatal visit. 1. Maternal congenital heart disease, antepartum    2. 34 weeks gestation of pregnancy        Next OB Visit 2 weeks. Subjective:     CC: Prenatal care    Narcisa Jarvis is a 20 y.o.  female who presents for routine prenatal care at 34w0d. Pregnancy ROS: no leakage of fluid, pelvic pain, or vaginal bleeding. normal fetal movement. The following portions of the patient's history were reviewed and updated as appropriate: allergies, current medications, past family history, past medical history, obstetric history, gynecologic history, past social history, past surgical history and problem list.      Objective:  /58   Ht 5' 3" (1.6 m)   Wt 81.9 kg (180 lb 9.6 oz)   LMP 2023 (Exact Date)   BMI 31.99 kg/m²   Pregravid Weight/BMI: 67.1 kg (148 lb) (BMI 26.22)  Current Weight: 81.9 kg (180 lb 9.6 oz)   Total Weight Gain: 14.8 kg (32 lb 9.6 oz)   Pre-Apollo Vitals      Flowsheet Row Most Recent Value   Prenatal Assessment    Fetal Heart Rate 150   Fundal Height (cm) 34 cm   Movement Present   Prenatal Vitals    Blood Pressure 120/58   Weight - Scale 81.9 kg (180 lb 9.6 oz)   Urine Albumin/Glucose    Dilation/Effacement/Station    Vaginal Drainage    Draining Fluid No   Edema    LLE Edema Trace   RLE Edema Trace   Facial Edema None             General: Well appearing, no distress  Abdomen: Soft, gravid, nontender  Extremities: Non tender.

## 2023-10-26 ENCOUNTER — TELEPHONE (OUTPATIENT)
Dept: OBGYN CLINIC | Facility: CLINIC | Age: 23
End: 2023-10-26

## 2023-10-26 NOTE — TELEPHONE ENCOUNTER
Reyna Leo is having some nasal/sinus congestion and coughing. Is Afebrile. Informed may take Tylenol or Sudafed medications. Mucinex without the "D", Flonase, Normal Saline or Nasonex nasal sprays, Robitussin CF,DM or PE, cough drops as needed. Knows to f/u with PCP,has a fever or symptoms worsen.

## 2023-11-03 ENCOUNTER — HOSPITAL ENCOUNTER (OUTPATIENT)
Facility: HOSPITAL | Age: 23
Discharge: HOME | End: 2023-11-03
Attending: OBSTETRICS & GYNECOLOGY | Admitting: OBSTETRICS & GYNECOLOGY
Payer: COMMERCIAL

## 2023-11-03 ENCOUNTER — ROUTINE PRENATAL (OUTPATIENT)
Dept: OBGYN CLINIC | Facility: CLINIC | Age: 23
End: 2023-11-03
Payer: COMMERCIAL

## 2023-11-03 VITALS — SYSTOLIC BLOOD PRESSURE: 131 MMHG | DIASTOLIC BLOOD PRESSURE: 81 MMHG | HEART RATE: 68 BPM

## 2023-11-03 VITALS
HEIGHT: 63 IN | BODY MASS INDEX: 33.13 KG/M2 | WEIGHT: 187 LBS | DIASTOLIC BLOOD PRESSURE: 82 MMHG | SYSTOLIC BLOOD PRESSURE: 142 MMHG

## 2023-11-03 DIAGNOSIS — O99.419 MATERNAL CONGENITAL HEART DISEASE, ANTEPARTUM: Primary | ICD-10-CM

## 2023-11-03 DIAGNOSIS — Z36.85 ENCOUNTER FOR ANTENATAL SCREENING FOR STREPTOCOCCUS B: ICD-10-CM

## 2023-11-03 DIAGNOSIS — Z3A.36 36 WEEKS GESTATION OF PREGNANCY: ICD-10-CM

## 2023-11-03 DIAGNOSIS — Q24.9 MATERNAL CONGENITAL HEART DISEASE, ANTEPARTUM: Primary | ICD-10-CM

## 2023-11-03 PROBLEM — R03.0 ELEVATED BP WITHOUT DIAGNOSIS OF HYPERTENSION: Status: ACTIVE | Noted: 2023-11-03

## 2023-11-03 LAB
ALBUMIN SERPL BCP-MCNC: 3.2 G/DL (ref 3.5–5)
ALP SERPL-CCNC: 198 U/L (ref 34–104)
ALT SERPL W P-5'-P-CCNC: 7 U/L (ref 7–52)
ANION GAP SERPL CALCULATED.3IONS-SCNC: 11 MMOL/L
AST SERPL W P-5'-P-CCNC: 14 U/L (ref 13–39)
BACTERIA UR QL AUTO: ABNORMAL /HPF
BASOPHILS # BLD AUTO: 0.04 THOUSANDS/ÂΜL (ref 0–0.1)
BASOPHILS NFR BLD AUTO: 0 % (ref 0–1)
BILIRUB SERPL-MCNC: 0.27 MG/DL (ref 0.2–1)
BILIRUB UR QL STRIP: NEGATIVE
BUN SERPL-MCNC: 5 MG/DL (ref 5–25)
CALCIUM ALBUM COR SERPL-MCNC: 9.2 MG/DL (ref 8.3–10.1)
CALCIUM SERPL-MCNC: 8.6 MG/DL (ref 8.4–10.2)
CHLORIDE SERPL-SCNC: 106 MMOL/L (ref 96–108)
CLARITY UR: CLEAR
CO2 SERPL-SCNC: 19 MMOL/L (ref 21–32)
COLOR UR: COLORLESS
CREAT SERPL-MCNC: 0.52 MG/DL (ref 0.6–1.3)
CREAT UR-MCNC: 30.6 MG/DL
EOSINOPHIL # BLD AUTO: 0.09 THOUSAND/ÂΜL (ref 0–0.61)
EOSINOPHIL NFR BLD AUTO: 1 % (ref 0–6)
ERYTHROCYTE [DISTWIDTH] IN BLOOD BY AUTOMATED COUNT: 14.1 % (ref 11.6–15.1)
GFR SERPL CREATININE-BSD FRML MDRD: 133 ML/MIN/1.73SQ M
GLUCOSE SERPL-MCNC: 97 MG/DL (ref 65–140)
GLUCOSE UR STRIP-MCNC: NEGATIVE MG/DL
HCT VFR BLD AUTO: 31.9 % (ref 34.8–46.1)
HGB BLD-MCNC: 9.9 G/DL (ref 11.5–15.4)
HGB UR QL STRIP.AUTO: NEGATIVE
IMM GRANULOCYTES # BLD AUTO: 0.08 THOUSAND/UL (ref 0–0.2)
IMM GRANULOCYTES NFR BLD AUTO: 1 % (ref 0–2)
KETONES UR STRIP-MCNC: NEGATIVE MG/DL
LEUKOCYTE ESTERASE UR QL STRIP: ABNORMAL
LYMPHOCYTES # BLD AUTO: 1.77 THOUSANDS/ÂΜL (ref 0.6–4.47)
LYMPHOCYTES NFR BLD AUTO: 15 % (ref 14–44)
MCH RBC QN AUTO: 26.4 PG (ref 26.8–34.3)
MCHC RBC AUTO-ENTMCNC: 31 G/DL (ref 31.4–37.4)
MCV RBC AUTO: 85 FL (ref 82–98)
MONOCYTES # BLD AUTO: 0.73 THOUSAND/ÂΜL (ref 0.17–1.22)
MONOCYTES NFR BLD AUTO: 6 % (ref 4–12)
NEUTROPHILS # BLD AUTO: 9.31 THOUSANDS/ÂΜL (ref 1.85–7.62)
NEUTS SEG NFR BLD AUTO: 77 % (ref 43–75)
NITRITE UR QL STRIP: NEGATIVE
NON-SQ EPI CELLS URNS QL MICRO: ABNORMAL /HPF
NRBC BLD AUTO-RTO: 0 /100 WBCS
PH UR STRIP.AUTO: 7 [PH]
PLATELET # BLD AUTO: 218 THOUSANDS/UL (ref 149–390)
PMV BLD AUTO: 11.2 FL (ref 8.9–12.7)
POTASSIUM SERPL-SCNC: 3.3 MMOL/L (ref 3.5–5.3)
PROT SERPL-MCNC: 6 G/DL (ref 6.4–8.4)
PROT UR STRIP-MCNC: NEGATIVE MG/DL
PROT UR-MCNC: 13 MG/DL
PROT/CREAT UR: 0.42 MG/G{CREAT} (ref 0–0.1)
RBC # BLD AUTO: 3.75 MILLION/UL (ref 3.81–5.12)
RBC #/AREA URNS AUTO: ABNORMAL /HPF
SL AMB  POCT GLUCOSE, UA: NORMAL
SL AMB POCT URINE PROTEIN: NORMAL
SODIUM SERPL-SCNC: 136 MMOL/L (ref 135–147)
SP GR UR STRIP.AUTO: <1.005 (ref 1–1.03)
UROBILINOGEN UR STRIP-ACNC: <2 MG/DL
WBC # BLD AUTO: 12.02 THOUSAND/UL (ref 4.31–10.16)
WBC #/AREA URNS AUTO: ABNORMAL /HPF

## 2023-11-03 PROCEDURE — 85025 COMPLETE CBC W/AUTO DIFF WBC: CPT | Performed by: OBSTETRICS & GYNECOLOGY

## 2023-11-03 PROCEDURE — 81002 URINALYSIS NONAUTO W/O SCOPE: CPT | Performed by: PHYSICIAN ASSISTANT

## 2023-11-03 PROCEDURE — 80053 COMPREHEN METABOLIC PANEL: CPT | Performed by: OBSTETRICS & GYNECOLOGY

## 2023-11-03 PROCEDURE — 99213 OFFICE O/P EST LOW 20 MIN: CPT

## 2023-11-03 PROCEDURE — 84156 ASSAY OF PROTEIN URINE: CPT | Performed by: OBSTETRICS & GYNECOLOGY

## 2023-11-03 PROCEDURE — 82570 ASSAY OF URINE CREATININE: CPT | Performed by: OBSTETRICS & GYNECOLOGY

## 2023-11-03 PROCEDURE — NC001 PR NO CHARGE: Performed by: OBSTETRICS & GYNECOLOGY

## 2023-11-03 PROCEDURE — PNV: Performed by: PHYSICIAN ASSISTANT

## 2023-11-03 PROCEDURE — 81001 URINALYSIS AUTO W/SCOPE: CPT | Performed by: OBSTETRICS & GYNECOLOGY

## 2023-11-03 NOTE — PROGRESS NOTES
Routine Prenatal Visit  [address] [place] [address], [address], [facility], [address] [facility],8W    Assessment/Plan:  Oksana Gomez is a 20y.o. year old  at 41w1d who presents for routine prenatal visit. 1. Maternal congenital heart disease, antepartum    2. 36 weeks gestation of pregnancy  Assessment & Plan:  GBS done today. Delivery consent reviewed and signed. Risks of delivery reviewed, including bleeding, infection, damage to surrounding organs/structures (specifically bowel, bladder, vessels, nerves, ureters), need for operative vaginal delivery or  delivery, hysterectomy, need for blood transfusion, need for further surgery. Reviewed s/s of labor, how and when to call. BP left arm 142/82. Right arm 130/82. [facility] Sent to L&D for preeclampsia evaluation. Orders:  -     POCT urine dip    3. Encounter for  screening for Streptococcus B  -     Strep B DNA probe, amplification        Next OB Visit 1 week. Subjective:     CC: Prenatal care    Cristina Guzmán is a 20 y.o.  female who presents for routine prenatal care at 41w1d. Pregnancy ROS: Good FM, reports worsening swelling over the last week. Has noticed RUQ pain in the last week as well. Improves with positional changes unsure if baby's position. Denies headaches, visual changes. No N/V, HA, cramping, VB, LOF, domestic violence, or smoking. Tolerating PNV.         The following portions of the patient's history were reviewed and updated as appropriate: allergies, current medications, past family history, past medical history, obstetric history, gynecologic history, past social history, past surgical history and problem list.      Objective:  /82 (BP Location: Left arm, Patient Position: Sitting, Cuff Size: Standard)   Ht 5' 3" (1.6 m)   Wt 84.8 kg (187 lb)   LMP 2023 (Exact Date)   BMI 33.13 kg/m²   Pregravid Weight/BMI: 67.1 kg (148 lb) (BMI 26.22)  Current Weight: 84.8 kg (187 lb)   Total Weight Gain: 17.7 kg (39 lb)   Pre- Vitals      Flowsheet Row Most Recent Value   Prenatal Assessment    Fetal Heart Rate 140   Fundal Height (cm) 36 cm   Movement Present   Prenatal Vitals    Blood Pressure 142/82   Weight - Scale 84.8 kg (187 lb)   Urine Albumin/Glucose    Dilation/Effacement/Station    Vaginal Drainage    Edema    LLE Edema +1   RLE Edema +1   Facial Edema None             General: Well appearing, no distress  Abdomen: Soft, gravid, nontender  Fundal Height: Appropriate for gestational age. Extremities: Warm and well perfused. Non tender.

## 2023-11-03 NOTE — PROCEDURES
[address] [facility], a Chelo Erickson at 36w2d with an ROB of 11/29/2023, by Last Menstrual Period, was seen at [address] for the following procedure(s):  ]             NST  Baseline 140  Variability moderate  accels yes  decels no  Ctx- no    Reactive NST    Pt has appt for fu   Currently has no complaints

## 2023-11-03 NOTE — H&P
OB H&P  Emory Messinamaisha  2000  LD TRIAGE 02LD TRIAGE 2*      20 y.o. yo  at 39 weeks by us and lmp    Chief complaint:  Elevated BP in office  142/82, repeat  130/8    HPI:  Pt presents with above  since 330 PM.  No symptoms, routine office visit        Pregnancy Complications:  Patient Active Problem List   Diagnosis    Allergic rhinitis    LGSIL of cervix of undetermined significance    Maternal congenital heart disease, antepartum    Encounter for other specified  screening    36 weeks gestation of pregnancy    Care and examination of lactating mother         Past Medical History:  Past Medical History:   Diagnosis Date    Abnormal Pap smear of cervix     Allergic     Impacted teeth     Resolved 10/12/2017     Ventricular septal defect        Past Surgical History:  Past Surgical History:   Procedure Laterality Date    WISDOM TOOTH EXTRACTION         Social Hx:  Social History     Socioeconomic History    Marital status: /Civil Union     Spouse name: Not on file    Number of children: Not on file    Years of education: Not on file    Highest education level: Not on file   Occupational History    Occupation: Student   Tobacco Use    Smoking status: Never     Passive exposure: Never    Smokeless tobacco: Never   Vaping Use    Vaping Use: Never used   Substance and Sexual Activity    Alcohol use: Not Currently    Drug use: Never    Sexual activity: Yes     Partners: Male     Birth control/protection: Condom Male   Other Topics Concern    Not on file   Social History Narrative    Not on file     Social Determinants of Health     Financial Resource Strain: Not on file   Food Insecurity: Not on file   Transportation Needs: Not on file   Physical Activity: Inactive (2019)    Exercise Vital Sign     Days of Exercise per Week: 0 days     Minutes of Exercise per Session: 0 min   Stress: No Stress Concern Present (2019)    [place] Stress Questionnaire     Feeling of Stress : Only a little   Social Connections: Not on file   Intimate Partner Violence: Not At Risk (10/10/2022)    Humiliation, Afraid, Rape, and Kick questionnaire     Fear of Current or Ex-Partner: No     Emotionally Abused: No     Physically Abused: No     Sexually Abused: No   Housing Stability: Not on file       Meds:  No current facility-administered medications on file prior to encounter. Current Outpatient Medications on File Prior to Encounter   Medication Sig Dispense Refill    calcium carbonate (TUMS) 500 mg chewable tablet Chew 2 tablets daily      Prenatal Vit-Fe Fumarate-FA (PRENATAL VITAMINS PO) Take by mouth         Allergies: Allergies   Allergen Reactions    Azithromycin Rash       Obstetric History:    G 1    Labs:  No results found for: "STREPGRPB"  Type & Screen:  ABO Grouping   Date Value Ref Range Status   05/01/2023 O  Final      Rh Type   Date Value Ref Range Status   05/01/2023 Positive  Final     Comment:     Please note: Prior records for this patient's ABO / Rh type are not  available for additional verification. No results found for: "ANTIBODYSCR"  No results found for: "SPECIMEXP"  HIV-1/HIV-2 AB   Date Value Ref Range Status   05/01/2023 Non-Reactive  Final     Hepatitis B Surface Ag   Date Value Ref Range Status   05/01/2023 negative  Final     RPR   Date Value Ref Range Status   09/12/2023 Non Reactive Non Reactive Final     No results found for: "RUBELLAIGGQT"  Glucose   Date Value Ref Range Status   09/12/2023 140 (H) 70 - 139 mg/dL Final     Comment:     According to ADA, a glucose threshold of >139 mg/dL after 50-gram  load identifies approximately 80% of women with gestational  diabetes mellitus, while the sensitivity is further increased to  approximately 90% by a threshold of >129 mg/dL.          Labs  P/C ratio 0.42 but ua with innumerable epithelial cells,  moderate bacteria and WBCs- all sources of protein  WBC 12  H/H 9. 9/31.9  Plt 218K  CMP wnl for pregnancy      Physical Exam:  Vital signs:    Vitals:    23 1820   BP: 131/81   Pulse: 68       Heart: RRR  Lungs: clear to ausculation   Abdomen: soft, nontender, gravid,   Estimated Fetal Weight: 6 lbs  Extremeties: min edema, no louis's or paola's sign  Presentation: vertex  Cervix: deferred  FHR: cat 1  CTXN: none      Assessment:    at 36 weeks. Elevated BP without diagnosis of hypertension  Elevated P/C ratio but very contaminated specimen- even if + PreE, no severe features at present  NST reassuring    Plan:   Discussed with Dr. Ehsan Sherman results and plan and prior hx  Will return to L&D in 2 days for BP check and repeat clean catch UA with P/C ratio.   Discussed instructions for clean catch  preE precautions given

## 2023-11-03 NOTE — ASSESSMENT & PLAN NOTE
GBS done today. Delivery consent reviewed and signed. Risks of delivery reviewed, including bleeding, infection, damage to surrounding organs/structures (specifically bowel, bladder, vessels, nerves, ureters), need for operative vaginal delivery or  delivery, hysterectomy, need for blood transfusion, need for further surgery. Reviewed s/s of labor, how and when to call. BP left arm 142/82. Right arm 130/82. Reviewed with Dr. Joy Wayne. Sent to L&D for preeclampsia evaluation.

## 2023-11-05 ENCOUNTER — HOSPITAL ENCOUNTER (OUTPATIENT)
Dept: LABOR AND DELIVERY | Facility: HOSPITAL | Age: 23
Discharge: HOME | End: 2023-11-05
Payer: COMMERCIAL

## 2023-11-05 ENCOUNTER — HOSPITAL ENCOUNTER (OUTPATIENT)
Facility: HOSPITAL | Age: 23
Discharge: HOME | End: 2023-11-05
Attending: OBSTETRICS & GYNECOLOGY | Admitting: OBSTETRICS & GYNECOLOGY
Payer: COMMERCIAL

## 2023-11-05 VITALS
BODY MASS INDEX: 33.13 KG/M2 | WEIGHT: 187 LBS | RESPIRATION RATE: 18 BRPM | HEIGHT: 63 IN | DIASTOLIC BLOOD PRESSURE: 86 MMHG | SYSTOLIC BLOOD PRESSURE: 140 MMHG | HEART RATE: 66 BPM | TEMPERATURE: 98.5 F | OXYGEN SATURATION: 97 %

## 2023-11-05 PROBLEM — O14.90 PRE-ECLAMPSIA: Status: ACTIVE | Noted: 2023-11-05

## 2023-11-05 LAB
BACTERIA UR QL AUTO: ABNORMAL /HPF
BILIRUB UR QL STRIP: NEGATIVE
CLARITY UR: CLEAR
COLOR UR: YELLOW
CREAT UR-MCNC: 41 MG/DL
GLUCOSE UR STRIP-MCNC: NEGATIVE MG/DL
GP B STREP DNA SPEC QL NAA+PROBE: NEGATIVE
HGB UR QL STRIP.AUTO: NEGATIVE
KETONES UR STRIP-MCNC: NEGATIVE MG/DL
LEUKOCYTE ESTERASE UR QL STRIP: NEGATIVE
MUCOUS THREADS UR QL AUTO: ABNORMAL
NITRITE UR QL STRIP: NEGATIVE
NON-SQ EPI CELLS URNS QL MICRO: ABNORMAL /HPF
PH UR STRIP.AUTO: 7 [PH]
PROT UR STRIP-MCNC: NEGATIVE MG/DL
PROT UR-MCNC: 13 MG/DL
PROT/CREAT UR: 0.32 MG/G{CREAT} (ref 0–0.1)
RBC #/AREA URNS AUTO: ABNORMAL /HPF
SP GR UR STRIP.AUTO: <1.005 (ref 1–1.03)
UROBILINOGEN UR STRIP-ACNC: <2 MG/DL
WBC #/AREA URNS AUTO: ABNORMAL /HPF

## 2023-11-05 PROCEDURE — 99212 OFFICE O/P EST SF 10 MIN: CPT

## 2023-11-05 PROCEDURE — 82570 ASSAY OF URINE CREATININE: CPT | Performed by: OBSTETRICS & GYNECOLOGY

## 2023-11-05 PROCEDURE — 84156 ASSAY OF PROTEIN URINE: CPT | Performed by: OBSTETRICS & GYNECOLOGY

## 2023-11-05 PROCEDURE — 81001 URINALYSIS AUTO W/SCOPE: CPT | Performed by: OBSTETRICS & GYNECOLOGY

## 2023-11-05 PROCEDURE — NC001 PR NO CHARGE: Performed by: OBSTETRICS & GYNECOLOGY

## 2023-11-05 NOTE — H&P
OB H&P  Steve Gillis [facility]  2000  LD TRIAGE 02/LD TRIAGE 2*      20 y.o. yo  at 36 weeks by us and lmp    Chief complaint:  BP recheck    HPI:  Pt presents with hx elevated BP  since 3 days ago. Seen on L&D 11/3 for elevated BP in office. UA here with p/c ratio 0.42 but not clean catch.   Here for repeat nst and p/c ratio  No symptoms       Pregnancy Complications:  Patient Active Problem List   Diagnosis    Allergic rhinitis    LGSIL of cervix of undetermined significance    Maternal congenital heart disease, antepartum    Encounter for other specified  screening    36 weeks gestation of pregnancy    Care and examination of lactating mother    Elevated BP without diagnosis of hypertension         Past Medical History:  Past Medical History:   Diagnosis Date    Abnormal Pap smear of cervix     Allergic     Impacted teeth     Resolved 10/12/2017     Ventricular septal defect        Past Surgical History:  Past Surgical History:   Procedure Laterality Date    WISDOM TOOTH EXTRACTION         Social Hx:  Social History     Socioeconomic History    Marital status: /Civil Union     Spouse name: Not on file    Number of children: Not on file    Years of education: Not on file    Highest education level: Not on file   Occupational History    Occupation: Student   Tobacco Use    Smoking status: Never     Passive exposure: Never    Smokeless tobacco: Never   Vaping Use    Vaping Use: Never used   Substance and Sexual Activity    Alcohol use: Not Currently    Drug use: Never    Sexual activity: Yes     Partners: Male     Birth control/protection: Condom Male   Other Topics Concern    Not on file   Social History Narrative    Not on file     Social Determinants of Health     Financial Resource Strain: Not on file   Food Insecurity: Not on file   Transportation Needs: Not on file   Physical Activity: Inactive (2019)    Exercise Vital Sign     Days of Exercise per Week: 0 days     Minutes of Exercise per Session: 0 min   Stress: No Stress Concern Present (2019)    [address] [facility]     Feeling of Stress : Only a little   Social Connections: Not on file   Intimate Partner Violence: Not At Risk (10/10/2022)    Humiliation, Afraid, Rape, and Kick questionnaire     Fear of Current or Ex-Partner: No     Emotionally Abused: No     Physically Abused: No     Sexually Abused: No   Housing Stability: Not on file       Meds:  No current facility-administered medications on file prior to encounter. Current Outpatient Medications on File Prior to Encounter   Medication Sig Dispense Refill    calcium carbonate (TUMS) 500 mg chewable tablet Chew 2 tablets daily      Prenatal Vit-Fe Fumarate-FA (PRENATAL VITAMINS PO) Take by mouth         Allergies: Allergies   Allergen Reactions    Azithromycin Rash       Obstetric History:    G 1    Labs:  Strep Grp B REGI   Date Value Ref Range Status   2023 Negative Negative Final     Comment:     Centers for Disease Control and Prevention (CDC) and American Congress  of Obstetricians and Gynecologists (ACOG) guidelines for prevention of   group B streptococcal (GBS) disease specify co-collection of  a vaginal and rectal swab specimen to maximize sensitivity of GBS  detection. Per the CDC and ACOG, swabbing both the lower vagina and  rectum substantially increases the yield of detection compared with  sampling the vagina alone. Penicillin G, ampicillin, or cefazolin are indicated for intrapartum  prophylaxis of  GBS colonization. Reflex susceptibility  testing should be performed prior to use of clindamycin only on GBS  isolates from penicillin-allergic women who are considered a high risk  for anaphylaxis. Treatment with vancomycin without additional testing  is warranted if resistance to clindamycin is noted.        Type & Screen:  ABO Grouping   Date Value Ref Range Status   2023 O Final      Rh Type   Date Value Ref Range Status   2023 Positive  Final     Comment:     Please note: Prior records for this patient's ABO / Rh type are not  available for additional verification. No results found for: "ANTIBODYSCR"  No results found for: "SPECIMEXP"  HIV-1/HIV-2 AB   Date Value Ref Range Status   2023 Non-Reactive  Final     Hepatitis B Surface Ag   Date Value Ref Range Status   2023 negative  Final     RPR   Date Value Ref Range Status   2023 Non Reactive Non Reactive Final     No results found for: "RUBELLAIGGQT"  Glucose   Date Value Ref Range Status   2023 140 (H) 70 - 139 mg/dL Final     Comment:     According to ADA, a glucose threshold of >139 mg/dL after 50-gram  load identifies approximately 80% of women with gestational  diabetes mellitus, while the sensitivity is further increased to  approximately 90% by a threshold of >129 mg/dL. Physical Exam:  Vital signs:    Vitals:    23 1406   BP: 140/86   Pulse: 66   Resp:    Temp:    SpO2:        Heart: RRR  Lungs: clear to ausculation   Abdomen: soft, nontender, gravid,   Estimated Fetal Weight: 6 lbs  Extremeties: min edema, no louis's or paola's sign  Presentation: vertex  Cervix: deferred  FHR: cat 1  CTXN: none    Labs    P/c ratio 0.32        Assessment:    at 36 weeks.    New onset preE without severe features    Plan:   Ok for d/c advised of need for early delivery  Will fu with office in AM

## 2023-11-06 ENCOUNTER — TELEPHONE (OUTPATIENT)
Dept: OBGYN CLINIC | Facility: CLINIC | Age: 23
End: 2023-11-06

## 2023-11-06 NOTE — TELEPHONE ENCOUNTER
Steve l/m her B/P today was 147/97. Increased swelling of lower extremities which improved with elevating feet. RUQ pain x 2 this afternoon which resolved spontaneously. Gurdeep Mendes feels it was due to position of baby. Advised will review with on call provider and call her back. Reviewed recent new diagnosis of GHTN, early Pre E, B/P an current symptoms with Dr Daryn Serrano who recommends continue to monitor. Report B/P above 160/100 or symptoms of PRE E. Return call to steve, reviewed recommendation to monitor B/P and parameters. Call for headache, unrelieved with tylenol, blurred vision, increase swelling hands/feet face, RUQ pain or any other new symptoms or just not feeling well. Otherwise keep appt for tomorrow to plan for induction. Patient verbalized understanding and agreement to plan.

## 2023-11-06 NOTE — TELEPHONE ENCOUNTER
Steve l/m she was discharged from hospital. Diangosed with onset of pre-eclampsia-recommended to schedule induction within next week. Return call to setve, she is 36w5d. States Dr. Dudley Led advised her to schedule induction with 1-2 weeks. Santos Cain has f/u appt tomorrow with Dr. Thompson Doherty and will discuss need for induction at that time. Requested she check her b/p today and call back with result. Patient in agreement.

## 2023-11-07 ENCOUNTER — ROUTINE PRENATAL (OUTPATIENT)
Dept: OBGYN CLINIC | Facility: CLINIC | Age: 23
End: 2023-11-07
Payer: COMMERCIAL

## 2023-11-07 VITALS
HEIGHT: 63 IN | SYSTOLIC BLOOD PRESSURE: 120 MMHG | DIASTOLIC BLOOD PRESSURE: 80 MMHG | BODY MASS INDEX: 34.2 KG/M2 | WEIGHT: 193 LBS

## 2023-11-07 DIAGNOSIS — O14.93 PRE-ECLAMPSIA IN THIRD TRIMESTER: Primary | ICD-10-CM

## 2023-11-07 DIAGNOSIS — Z3A.36 36 WEEKS GESTATION OF PREGNANCY: ICD-10-CM

## 2023-11-07 LAB
SL AMB  POCT GLUCOSE, UA: NORMAL
SL AMB POCT URINE PROTEIN: NORMAL

## 2023-11-07 PROCEDURE — PNV: Performed by: OBSTETRICS & GYNECOLOGY

## 2023-11-07 PROCEDURE — 81002 URINALYSIS NONAUTO W/O SCOPE: CPT | Performed by: OBSTETRICS & GYNECOLOGY

## 2023-11-07 NOTE — ASSESSMENT & PLAN NOTE
Preeclampsia without severe features diagnosed 11/5/2023. She has had some mildly elevated BP at home since then, but today normal in office. She is scheduled for cervical ripening tomorrow evening when she is 37.0 weeks. Induction consent reviewed and signed. Continue to watch for signs of pre-eclampsia and BP at home until then - call if BP > 160/110.

## 2023-11-07 NOTE — PROGRESS NOTES
Routine Prenatal Visit  [address] [place] [address], [address], [facility], [address] [facility],[address]W    Assessment/Plan:  Andre Montanez is a 20y.o. year old  at 40w7d who presents for routine prenatal visit. 1. Pre-eclampsia in third trimester  Assessment & Plan:  Preeclampsia without severe features diagnosed 2023. She has had some mildly elevated BP at home since then, but today normal in office. She is scheduled for cervical ripening tomorrow evening when she is 37.0 weeks. Induction consent reviewed and signed. Continue to watch for signs of pre-eclampsia and BP at home until then - call if BP > 160/110.      2. 36 weeks gestation of pregnancy  -     POCT urine dip          Subjective:     CC: Prenatal care    Maykel Rodriguez is a 20 y.o.  female who presents for routine prenatal care at 36w6d. Pregnancy ROS: no leakage of fluid, pelvic pain, or vaginal bleeding. normal fetal movement.     The following portions of the patient's history were reviewed and updated as appropriate: allergies, current medications, past family history, past medical history, obstetric history, gynecologic history, past social history, past surgical history and problem list.      Objective:  /80 (BP Location: Right arm, Patient Position: Sitting, Cuff Size: Standard)   Ht 5' 3" (1.6 m)   Wt 87.5 kg (193 lb)   LMP 2023 (Exact Date)   BMI 34.19 kg/m²   Pregravid Weight/BMI: 67.1 kg (148 lb) (BMI 26.22)  Current Weight: 87.5 kg (193 lb)   Total Weight Gain: 20.4 kg (45 lb)   Pre-Apollo Vitals      Flowsheet Row Most Recent Value   Prenatal Assessment    Fetal Heart Rate 150   Fundal Height (cm) 36 cm   Movement Present   Presentation Vertex   Prenatal Vitals    Blood Pressure 120/80   Weight - Scale 87.5 kg (193 lb)   Urine Albumin/Glucose    Dilation/Effacement/Station    Cervical Dilation 0   Cervical Effacement 0   Fetal Station -4   Vaginal Drainage    Edema    LLE Edema +1   RLE Edema +1 General: Well appearing, no distress  Abdomen: Soft, gravid, nontender  Extremities: Non tender

## 2023-11-07 NOTE — PATIENT INSTRUCTIONS
- Please call office if leaking of fluid or bleeding.  - You may be in labor if you are having regular contractions for > 1 hour (lasting 1 minute, every 5 minutes, becoming more painful for over time, and do not decrease with rest and drinking 2 glasses of water or other fluid). - Please call if you feel a significant decrease in fetal movement and during fetal kick counts the baby does not move 10 times in 2 hours.    - continue to monitor blood pressure at home - call if > 160/110, or if you develop a headache that doesn't improve with Tylenol and rest, vision changes, nausea/vomiting, shortness of breath

## 2023-11-08 ENCOUNTER — HOSPITAL ENCOUNTER (INPATIENT)
Facility: HOSPITAL | Age: 23
LOS: 4 days | Discharge: HOME | End: 2023-11-12
Attending: OBSTETRICS & GYNECOLOGY | Admitting: OBSTETRICS & GYNECOLOGY
Payer: COMMERCIAL

## 2023-11-08 ENCOUNTER — HOSPITAL ENCOUNTER (OUTPATIENT)
Dept: LABOR AND DELIVERY | Facility: HOSPITAL | Age: 23
Discharge: HOME | End: 2023-11-08
Payer: COMMERCIAL

## 2023-11-08 PROBLEM — Z3A.37 37 WEEKS GESTATION OF PREGNANCY: Status: ACTIVE | Noted: 2023-08-18

## 2023-11-08 LAB
ABO GROUP BLD: NORMAL
ALBUMIN SERPL BCP-MCNC: 3.7 G/DL (ref 3.5–5)
ALP SERPL-CCNC: 218 U/L (ref 34–104)
ALT SERPL W P-5'-P-CCNC: 15 U/L (ref 7–52)
ANION GAP SERPL CALCULATED.3IONS-SCNC: 8 MMOL/L
AST SERPL W P-5'-P-CCNC: 17 U/L (ref 13–39)
BILIRUB SERPL-MCNC: 0.38 MG/DL (ref 0.2–1)
BLD GP AB SCN SERPL QL: NEGATIVE
BUN SERPL-MCNC: 6 MG/DL (ref 5–25)
CALCIUM SERPL-MCNC: 9.2 MG/DL (ref 8.4–10.2)
CHLORIDE SERPL-SCNC: 106 MMOL/L (ref 96–108)
CO2 SERPL-SCNC: 22 MMOL/L (ref 21–32)
CREAT SERPL-MCNC: 0.54 MG/DL (ref 0.6–1.3)
CREAT UR-MCNC: 49.1 MG/DL
ERYTHROCYTE [DISTWIDTH] IN BLOOD BY AUTOMATED COUNT: 14.4 % (ref 11.6–15.1)
GFR SERPL CREATININE-BSD FRML MDRD: 131 ML/MIN/1.73SQ M
GLUCOSE SERPL-MCNC: 82 MG/DL (ref 65–140)
HCT VFR BLD AUTO: 32.8 % (ref 34.8–46.1)
HGB BLD-MCNC: 10.5 G/DL (ref 11.5–15.4)
HOLD SPECIMEN: NORMAL
MCH RBC QN AUTO: 26.1 PG (ref 26.8–34.3)
MCHC RBC AUTO-ENTMCNC: 32 G/DL (ref 31.4–37.4)
MCV RBC AUTO: 81 FL (ref 82–98)
PLATELET # BLD AUTO: 228 THOUSANDS/UL (ref 149–390)
PMV BLD AUTO: 10.7 FL (ref 8.9–12.7)
POTASSIUM SERPL-SCNC: 3.1 MMOL/L (ref 3.5–5.3)
PROT SERPL-MCNC: 7 G/DL (ref 6.4–8.4)
PROT UR-MCNC: 20 MG/DL
PROT/CREAT UR: 0.41 MG/G{CREAT} (ref 0–0.1)
RBC # BLD AUTO: 4.03 MILLION/UL (ref 3.81–5.12)
RH BLD: POSITIVE
SODIUM SERPL-SCNC: 136 MMOL/L (ref 135–147)
SPECIMEN EXPIRATION DATE: NORMAL
WBC # BLD AUTO: 11.59 THOUSAND/UL (ref 4.31–10.16)

## 2023-11-08 PROCEDURE — 86920 COMPATIBILITY TEST SPIN: CPT

## 2023-11-08 PROCEDURE — 80053 COMPREHEN METABOLIC PANEL: CPT | Performed by: OBSTETRICS & GYNECOLOGY

## 2023-11-08 PROCEDURE — 84156 ASSAY OF PROTEIN URINE: CPT | Performed by: OBSTETRICS & GYNECOLOGY

## 2023-11-08 PROCEDURE — 86900 BLOOD TYPING SEROLOGIC ABO: CPT | Performed by: OBSTETRICS & GYNECOLOGY

## 2023-11-08 PROCEDURE — 86850 RBC ANTIBODY SCREEN: CPT | Performed by: OBSTETRICS & GYNECOLOGY

## 2023-11-08 PROCEDURE — 82570 ASSAY OF URINE CREATININE: CPT | Performed by: OBSTETRICS & GYNECOLOGY

## 2023-11-08 PROCEDURE — 86780 TREPONEMA PALLIDUM: CPT | Performed by: OBSTETRICS & GYNECOLOGY

## 2023-11-08 PROCEDURE — 3E033VJ INTRODUCTION OF OTHER HORMONE INTO PERIPHERAL VEIN, PERCUTANEOUS APPROACH: ICD-10-PCS | Performed by: OBSTETRICS & GYNECOLOGY

## 2023-11-08 PROCEDURE — 4A1HXCZ MONITORING OF PRODUCTS OF CONCEPTION, CARDIAC RATE, EXTERNAL APPROACH: ICD-10-PCS | Performed by: OBSTETRICS & GYNECOLOGY

## 2023-11-08 PROCEDURE — 85027 COMPLETE CBC AUTOMATED: CPT | Performed by: OBSTETRICS & GYNECOLOGY

## 2023-11-08 PROCEDURE — 86901 BLOOD TYPING SEROLOGIC RH(D): CPT | Performed by: OBSTETRICS & GYNECOLOGY

## 2023-11-08 PROCEDURE — NC001 PR NO CHARGE: Performed by: OBSTETRICS & GYNECOLOGY

## 2023-11-08 PROCEDURE — 3E0P7VZ INTRODUCTION OF HORMONE INTO FEMALE REPRODUCTIVE, VIA NATURAL OR ARTIFICIAL OPENING: ICD-10-PCS | Performed by: OBSTETRICS & GYNECOLOGY

## 2023-11-08 RX ORDER — SODIUM CHLORIDE, SODIUM LACTATE, POTASSIUM CHLORIDE, CALCIUM CHLORIDE 600; 310; 30; 20 MG/100ML; MG/100ML; MG/100ML; MG/100ML
125 INJECTION, SOLUTION INTRAVENOUS CONTINUOUS
Status: DISCONTINUED | OUTPATIENT
Start: 2023-11-08 | End: 2023-11-10

## 2023-11-08 RX ORDER — ONDANSETRON 2 MG/ML
4 INJECTION INTRAMUSCULAR; INTRAVENOUS EVERY 6 HOURS PRN
Status: DISCONTINUED | OUTPATIENT
Start: 2023-11-08 | End: 2023-11-10

## 2023-11-08 RX ORDER — BUPIVACAINE HYDROCHLORIDE 2.5 MG/ML
30 INJECTION, SOLUTION EPIDURAL; INFILTRATION; INTRACAUDAL ONCE AS NEEDED
Status: DISCONTINUED | OUTPATIENT
Start: 2023-11-08 | End: 2023-11-12

## 2023-11-08 RX ADMIN — Medication 25 MCG: at 21:19

## 2023-11-08 RX ADMIN — SODIUM CHLORIDE, SODIUM LACTATE, POTASSIUM CHLORIDE, AND CALCIUM CHLORIDE 125 ML/HR: .6; .31; .03; .02 INJECTION, SOLUTION INTRAVENOUS at 22:14

## 2023-11-08 RX ADMIN — SODIUM CHLORIDE, SODIUM LACTATE, POTASSIUM CHLORIDE, AND CALCIUM CHLORIDE 1000 ML: .6; .31; .03; .02 INJECTION, SOLUTION INTRAVENOUS at 20:51

## 2023-11-09 LAB — TREPONEMA PALLIDUM IGG+IGM AB [PRESENCE] IN SERUM OR PLASMA BY IMMUNOASSAY: NORMAL

## 2023-11-09 PROCEDURE — NC001 PR NO CHARGE: Performed by: OBSTETRICS & GYNECOLOGY

## 2023-11-09 RX ORDER — OXYTOCIN/RINGER'S LACTATE 30/500 ML
1-30 PLASTIC BAG, INJECTION (ML) INTRAVENOUS
Status: DISCONTINUED | OUTPATIENT
Start: 2023-11-09 | End: 2023-11-12

## 2023-11-09 RX ORDER — NALBUPHINE HYDROCHLORIDE 10 MG/ML
5 INJECTION, SOLUTION INTRAMUSCULAR; INTRAVENOUS; SUBCUTANEOUS
Status: DISCONTINUED | OUTPATIENT
Start: 2023-11-09 | End: 2023-11-12

## 2023-11-09 RX ORDER — NALBUPHINE HYDROCHLORIDE 10 MG/ML
5 INJECTION, SOLUTION INTRAMUSCULAR; INTRAVENOUS; SUBCUTANEOUS EVERY 4 HOURS PRN
Status: DISCONTINUED | OUTPATIENT
Start: 2023-11-09 | End: 2023-11-09

## 2023-11-09 RX ADMIN — Medication 25 MCG: at 10:56

## 2023-11-09 RX ADMIN — Medication 25 MCG: at 01:37

## 2023-11-09 RX ADMIN — Medication 25 MCG: at 05:50

## 2023-11-09 RX ADMIN — SODIUM CHLORIDE, SODIUM LACTATE, POTASSIUM CHLORIDE, AND CALCIUM CHLORIDE 125 ML/HR: .6; .31; .03; .02 INJECTION, SOLUTION INTRAVENOUS at 05:50

## 2023-11-09 RX ADMIN — ONDANSETRON 4 MG: 2 INJECTION INTRAMUSCULAR; INTRAVENOUS at 17:08

## 2023-11-09 RX ADMIN — Medication 2 MILLI-UNITS/MIN: at 19:39

## 2023-11-09 RX ADMIN — SODIUM CHLORIDE, SODIUM LACTATE, POTASSIUM CHLORIDE, AND CALCIUM CHLORIDE 125 ML/HR: .6; .31; .03; .02 INJECTION, SOLUTION INTRAVENOUS at 19:40

## 2023-11-09 RX ADMIN — NALBUPHINE HYDROCHLORIDE 5 MG: 10 INJECTION, SOLUTION INTRAMUSCULAR; INTRAVENOUS; SUBCUTANEOUS at 16:04

## 2023-11-09 RX ADMIN — NALBUPHINE HYDROCHLORIDE 5 MG: 10 INJECTION, SOLUTION INTRAMUSCULAR; INTRAVENOUS; SUBCUTANEOUS at 20:45

## 2023-11-09 NOTE — PLAN OF CARE
Problem: BIRTH - VAGINAL/ SECTION  Goal: Fetal and maternal status remain reassuring during the birth process  Description: INTERVENTIONS:  - Monitor vital signs  - Monitor fetal heart rate  - Monitor uterine activity  - Monitor labor progression (vaginal delivery)  - DVT prophylaxis  - Antibiotic prophylaxis  Outcome: Progressing  Goal: Emotionally satisfying birthing experience for mother/fetus  Description: Interventions:  - Assess, plan, implement and evaluate the nursing care given to the patient in labor  - Advocate the philosophy that each childbirth experience is a unique experience and support the family's chosen level of involvement and control during the labor process   - Actively participate in both the patient's and family's teaching of the birth process  - Consider cultural, Mosque and age-specific factors and plan care for the patient in labor  Outcome: Progressing     Problem: PAIN - ADULT  Goal: Verbalizes/displays adequate comfort level or baseline comfort level  Description: Interventions:  - Encourage patient to monitor pain and request assistance  - Assess pain using appropriate pain scale  - Administer analgesics based on type and severity of pain and evaluate response  - Implement non-pharmacological measures as appropriate and evaluate response  - Consider cultural and social influences on pain and pain management  - Notify physician/advanced practitioner if interventions unsuccessful or patient reports new pain  Outcome: Progressing     Problem: INFECTION - ADULT  Goal: Absence or prevention of progression during hospitalization  Description: INTERVENTIONS:  - Assess and monitor for signs and symptoms of infection  - Monitor lab/diagnostic results  - Monitor all insertion sites, i.e. indwelling lines, tubes, and drains  - Monitor endotracheal if appropriate and nasal secretions for changes in amount and color  - [place] appropriate cooling/warming therapies per order  - Administer medications as ordered  - Instruct and encourage patient and family to use good hand hygiene technique  - Identify and instruct in appropriate isolation precautions for identified infection/condition  Outcome: Progressing  Goal: Absence of fever/infection during neutropenic period  Description: INTERVENTIONS:  - Monitor WBC    Outcome: Progressing     Problem: SAFETY ADULT  Goal: Patient will remain free of falls  Description: INTERVENTIONS:  - Educate patient/family on patient safety including physical limitations  - Instruct patient to call for assistance with activity   - Consult OT/PT to assist with strengthening/mobility   - Keep Call bell within reach  - Keep bed low and locked with side rails adjusted as appropriate  - Keep care items and personal belongings within reach  - Initiate and maintain comfort rounds  - Make Fall Risk Sign visible to staff  - Apply yellow socks and bracelet for high fall risk patients  - Consider moving patient to room near nurses station  Outcome: Progressing  Goal: Maintain or return to baseline ADL function  Description: INTERVENTIONS:  -  Assess patient's ability to carry out ADLs; assess patient's baseline for ADL function and identify physical deficits which impact ability to perform ADLs (bathing, care of mouth/teeth, toileting, grooming, dressing, etc.)  - Assess/evaluate cause of self-care deficits   - Assess range of motion  - Assess patient's mobility; develop plan if impaired  - Assess patient's need for assistive devices and provide as appropriate  - Encourage maximum independence but intervene and supervise when necessary  - Involve family in performance of ADLs  - Assess for home care needs following discharge   - Consider OT consult to assist with ADL evaluation and planning for discharge  - Provide patient education as appropriate  Outcome: Progressing  Goal: Maintains/Returns to pre admission functional level  Description: INTERVENTIONS:  - Perform BMAT or MOVE assessment daily.   - Set and communicate daily mobility goal to care team and patient/family/caregiver. - Collaborate with rehabilitation services on mobility goals if consulted  - Perform Range of Motion  times a day. - Reposition patient every  hours. - Dangle patient  times a day  - Stand patient  times a day  - Ambulate patient  times a day  - Out of bed to chair  times a day   - Out of bed for meals times a day  - Out of bed for toileting  - Record patient progress and toleration of activity level   Outcome: Progressing     Problem: Knowledge Deficit  Goal: Patient/family/caregiver demonstrates understanding of disease process, treatment plan, medications, and discharge instructions  Description: Complete learning assessment and assess knowledge base.   Interventions:  - Provide teaching at level of understanding  - Provide teaching via preferred learning methods  Outcome: Progressing     Problem: DISCHARGE PLANNING  Goal: Discharge to home or other facility with appropriate resources  Description: INTERVENTIONS:  - Identify barriers to discharge w/patient and caregiver  - Arrange for needed discharge resources and transportation as appropriate  - Identify discharge learning needs (meds, wound care, etc.)  - Arrange for interpretive services to assist at discharge as needed  - Refer to Case Management Department for coordinating discharge planning if the patient needs post-hospital services based on physician/advanced practitioner order or complex needs related to functional status, cognitive ability, or social support system  Outcome: Progressing

## 2023-11-09 NOTE — OB LABOR/OXYTOCIN SAFETY PROGRESS
Labor Progress Note - Chica Castro 20 y.o. female MRN: [id number]    Unit/Bed#: -01 Encounter: [id number]       Contraction Frequency (minutes): irregular  Contraction Quality: Mild  Tachysystole: No     Cervical Dilation: 1  Cervical Effacement: 0  Fetal Station: -3    FHT baseline: 140bpm  Variability: moderate  Accels: present  Decels: Absent  Category 1      Vital Signs:   Vitals:    11/09/23 1625   BP: 160/86   Pulse: 60   Resp:    Temp:    SpO2:        Notes/comments:     Now s/p 4 vaginal doses of cytotec vaginally. Cervix 1cm. Discussed options of placing wilder balloon with Nubain vs epidural vs another dose of cytotec. She agreed to Nubain. Nubain 5mg IV given. Wilder balloon placed digitally and filled with 60cc NS. Patient tolerated, although did have severe range BP after placement, repeat BP was not in severe range.     Dhara Hamm MD 11/9/2023 4:28 PM

## 2023-11-09 NOTE — PLAN OF CARE
Problem: BIRTH - VAGINAL/ SECTION  Goal: Fetal and maternal status remain reassuring during the birth process  Description: INTERVENTIONS:  - Monitor vital signs  - Monitor fetal heart rate  - Monitor uterine activity  - Monitor labor progression (vaginal delivery)  - DVT prophylaxis  - Antibiotic prophylaxis  Outcome: Progressing  Goal: Emotionally satisfying birthing experience for mother/fetus  Description: Interventions:  - Assess, plan, implement and evaluate the nursing care given to the patient in labor  - Advocate the philosophy that each childbirth experience is a unique experience and support the family's chosen level of involvement and control during the labor process   - Actively participate in both the patient's and family's teaching of the birth process  - Consider cultural, Manda[place] and age-specific factors and plan care for the patient in labor  Outcome: Progressing     Problem: PAIN - ADULT  Goal: Verbalizes/displays adequate comfort level or baseline comfort level  Description: Interventions:  - Encourage patient to monitor pain and request assistance  - Assess pain using appropriate pain scale  - Administer analgesics based on type and severity of pain and evaluate response  - Implement non-pharmacological measures as appropriate and evaluate response  - Consider cultural and social influences on pain and pain management  - Notify physician/advanced practitioner if interventions unsuccessful or patient reports new pain  Outcome: Progressing     Problem: INFECTION - ADULT  Goal: Absence or prevention of progression during hospitalization  Description: INTERVENTIONS:  - Assess and monitor for signs and symptoms of infection  - Monitor lab/diagnostic results  - Monitor all insertion sites, i.e. indwelling lines, tubes, and drains  - Monitor endotracheal if appropriate and nasal secretions for changes in amount and color  - [place] appropriate cooling/warming therapies per order  - Administer medications as ordered  - Instruct and encourage patient and family to use good hand hygiene technique  - Identify and instruct in appropriate isolation precautions for identified infection/condition  Outcome: Progressing  Goal: Absence of fever/infection during neutropenic period  Description: INTERVENTIONS:  - Monitor WBC    Outcome: Progressing     Problem: SAFETY ADULT  Goal: Patient will remain free of falls  Description: INTERVENTIONS:  - Educate patient/family on patient safety including physical limitations  - Instruct patient to call for assistance with activity   - Consult OT/PT to assist with strengthening/mobility   - Keep Call bell within reach  - Keep bed low and locked with side rails adjusted as appropriate  - Keep care items and personal belongings within reach  - Initiate and maintain comfort rounds  - Make Fall Risk Sign visible to staff  - Apply yellow socks and bracelet for high fall risk patients  - Consider moving patient to room near nurses station  Outcome: Progressing  Goal: Maintain or return to baseline ADL function  Description: INTERVENTIONS:  -  Assess patient's ability to carry out ADLs; assess patient's baseline for ADL function and identify physical deficits which impact ability to perform ADLs (bathing, care of mouth/teeth, toileting, grooming, dressing, etc.)  - Assess/evaluate cause of self-care deficits   - Assess range of motion  - Assess patient's mobility; develop plan if impaired  - Assess patient's need for assistive devices and provide as appropriate  - Encourage maximum independence but intervene and supervise when necessary  - Involve family in performance of ADLs  - Assess for home care needs following discharge   - Consider OT consult to assist with ADL evaluation and planning for discharge  - Provide patient education as appropriate  Outcome: Progressing  Goal: Maintains/Returns to pre admission functional level  Description: INTERVENTIONS:  - Perform BMAT or MOVE assessment daily.   - Set and communicate daily mobility goal to care team and patient/family/caregiver. - Collaborate with rehabilitation services on mobility goals if consulted  - Out of bed for toileting  - Record patient progress and toleration of activity level   Outcome: Progressing     Problem: Knowledge Deficit  Goal: Patient/family/caregiver demonstrates understanding of disease process, treatment plan, medications, and discharge instructions  Description: Complete learning assessment and assess knowledge base.   Interventions:  - Provide teaching at level of understanding  - Provide teaching via preferred learning methods  Outcome: Progressing     Problem: DISCHARGE PLANNING  Goal: Discharge to home or other facility with appropriate resources  Description: INTERVENTIONS:  - Identify barriers to discharge w/patient and caregiver  - Arrange for needed discharge resources and transportation as appropriate  - Identify discharge learning needs (meds, wound care, etc.)  - Arrange for interpretive services to assist at discharge as needed  - Refer to Case Management Department for coordinating discharge planning if the patient needs post-hospital services based on physician/advanced practitioner order or complex needs related to functional status, cognitive ability, or social support system  Outcome: Progressing

## 2023-11-09 NOTE — OB LABOR/OXYTOCIN SAFETY PROGRESS
Labor Progress Note - Becky Hunt 20 y.o. female MRN: [id number]    Unit/Bed#: -01 Encounter: [id number]       Contraction Frequency (minutes): 2-8 (with irritability)  Contraction Quality: Mild  Tachysystole: No     Cervical Dilation: Closed  Cervical Effacement: 20  Fetal Station: Floating    FHT baseline: 130bpm  Variability: moderate  Accels: present  Decels: Absent  Category 1    Vital Signs:   Vitals:    11/09/23 0742   BP: 143/80   Pulse: 75   Resp: 18   Temp: 98 °F (36.7 °C)       Notes/comments:     Induction of labor for pre-eclampsia w/o severe features - has received misoprostol vaginally x 3 (25mcg each) - last at 05:50. Cervix was closed last exam.  She does have contractions on monitor, but feels relatively comfortable. She denies symptoms of pre-eclampsia and BP have been normal to mildly elevated since admission (mostly normal). She just had a light snack. Reviewed progress so far and plan to recheck around 10AM and access ability to place wilder balloon vs continue with cytotec for cervical ripening. Her and partner understand.         Laya Frazier MD 11/9/2023 9:19 AM

## 2023-11-09 NOTE — PROGRESS NOTES
Ob Labor Progress Note  Indio Cassette 20 y.o. female MRN: [id number]  Unit/Bed#: -01 Encounter: [id number]    A/P.  20 y.o. Tony Miller at 43w4d here for IoL for preeclampsia without severe features. (1) Preeclampsia without severe features. BP now normal to "mild range."  --> Follow BP/Sx.  --> Continue induction. (2) Preinduction cervical ripening. Status-post one dose of misoprostol. Cervix still closed and long. Irregular Ucs seen on toco, only rarely felt. --> Misoprostol 25mcg placed PV (dose #2). (3) Fetal status category I.    --> Continuous fetal monitoring. Wendie Oquendo MD  11/09/23  -------------------------------------------------------------------------------------    Subjective/    Comfortable. Rarely feels a UC. Objective/  Blood pressure 137/69, pulse 63, temperature 97.6 °F (36.4 °C), temperature source Temporal, resp. rate 18, height 5' 3" (1.6 m), weight 87.5 kg (193 lb), last menstrual period 02/22/2023, not currently breastfeeding.     No intake or output data in the 24 hours ending 11/09/23 0144      Physical Exam/      General:  Alert, comfortable, NAD      Fundus nontender      Cervix:          Closed / 0 / Floating,            Firm consistency / Posterior position      FHR: 120 moderate vcariability (+)accels, no decels    [place]: Irregular brief contractions, not felt      Labs/            Lab Results   Component Value Date    WBC 11.59 (H) 11/08/2023    HGB 10.5 (L) 11/08/2023    HCT 32.8 (L) 11/08/2023    MCV 81 (L) 11/08/2023     11/08/2023

## 2023-11-09 NOTE — OB LABOR/OXYTOCIN SAFETY PROGRESS
Oxytocin Safety Progress Check Note - Marily Lin 20 y.o. female MRN: [id number]    Unit/Bed#: -01 Encounter: [id number]       Contraction Frequency (minutes): 2-8 (with irritability)  Contraction Quality: Mild  Tachysystole: No   Cervical Dilation: Closed        Cervical Effacement: 20  Fetal Station: Floating  Baseline Rate: 135 bpm  Fetal Heart Rate: 132 BPM  FHR Category: 1               Vital Signs:   Vitals:    11/09/23 0742   BP: 143/80   Pulse: 75   Resp: 18   Temp: 98 °F (36.7 °C)       Notes/comments:         About 50% effaced now but still less than a fingertip external os   Pt very uncomfortable with exam so no attempt at balloon as low likelihood of success  Cytotec 25 placed vaginally    Александр Solares DO 11/9/2023 10:58 AM

## 2023-11-09 NOTE — PROGRESS NOTES
Ob Labor Progress Note  Berta Garcia 20 y.o. female MRN: [id number]  Unit/Bed#: -01 Encounter: [id number]    A/P.  20 y.o. [place] Carrington at 43w4d here for IoL for preeclampsia without severe features. (1) Preeclampsia without SF.  BP normal to "mild range."  Laboratory studies without evidence of end-organ damage. P/C ratio 0.41.  --> Follow BP/Sx.  --> Conitnue induction. (2) Preinduction cervical ripening. Status-post two doses of misoprostol, and cervix still closed, though now somewhat more effaced and softer. Contractions seen on toco, but not felt. Reasonable to give third dose misoprostol. --> Misoprostol 25mcg placed PV (dose #3)    (3) Fetal status category I.    --> Continuous fetal monitoring. Duke Marie MD  11/09/23  -------------------------------------------------------------------------------------    Subjective/    Comfortable. Rarely feels a cramp. Objective/  Blood pressure 137/69, pulse 63, temperature 97.6 °F (36.4 °C), temperature source Temporal, resp. rate 18, height 5' 3" (1.6 m), weight 87.5 kg (193 lb), last menstrual period 02/22/2023, not currently breastfeeding. No intake or output data in the 24 hours ending 11/09/23 0557      Physical Exam/      General:  Alert, comfortable, NAD      Fundus nontender      Cervix:          Closed / 0 / Floating,            Firm consistency / Posterior position      FHR: 130 moderate variability (+)accels, no decels    [place]: q2-6", not felt.       Labs/      Hgb 10.5      Platelets 323        BUN/Cr 6/0.54      AST/ALT 17/15        P/C ratio 20/49.1 = 0.41

## 2023-11-09 NOTE — H&P
Ob/Gyn History and Physical  Dorgael Torrez 20 y.o. female MRN: [id number]  Unit/Bed#: -01 Encounter: [id number]    A/P. 20 y.o. Alber Bowling at 37w0d, with preeclampsia without severe features. (1) Preeclampsia without severe features. No h/o CHTN. Elevated BP  after 20 weeks gestational age. Laboratory studies done 11/3/23 show no evidence of end-organ damage. P/C ratio was 0.32. Today /81. No symptoms of severe preeclampsia. Now term and delivery indicated. I discussed with Ms. Po Finn in detail the risks, benefits, and alternatives to induction of labor in this setting. The risks discussed included, but were not limited to: the risk of uterine rupture, with its attendant risks of hysterectomy and maternal and or fetal death; the risk of fetal hypoxic stress and permanent neurologic injury; as well as the risk of a prolonged induction ending in  section. Following our discussion of risks, benefits, and alternatives, all of Ms. Mahoney's questions were answered, and she stated her understanding, and her desire to assume these risks and to proceed with induction of labor. --> Admit. Labs/toco/IVF.  --> Induction of labor. (2) Preinduction cervical ripening. Nulliparous, but adequate pelvis. Cephalic, EFW [id number]. Cervix closed/long/high, firm/posterior. .  --> Misoprostol. (3) Elevated 1hrPG/normal 3hrGTT. 1hrPG was 140.  3hrGTT was 94/151/131/121 (normal x 4). At last growth ultrasound 10/4/23 (32.0) the US EFW was at the 42nd %ile. Clinical EFW today 3300grams. --> Follow labor curve. (4) Fetal status category I.    --> Continuous fetal monitoring. Ginna Villatoro MD  23  -------------------------------------------------------------------------------------------------------  CC/    "I am here for my induction"    HPI/    Feels entirely well.  (+)FM, no UC/VB/LoF. No HA/VF changes.     Pregnancy complications/      Patient Active Problem List Diagnosis    Allergic rhinitis    LGSIL of cervix of undetermined significance    Maternal congenital heart disease, antepartum    Encounter for other specified  screening    37 weeks gestation of pregnancy    Care and examination of lactating mother    Elevated BP without diagnosis of hypertension    Pre-eclampsia       Allergies/      Allergies as of 2023 - Reviewed 2023   Allergen Reaction Noted    Azithromycin Rash 2017       Rx/      No current facility-administered medications on file prior to encounter. Current Outpatient Medications on File Prior to Encounter   Medication Sig Dispense Refill    calcium carbonate (TUMS) 500 mg chewable tablet Chew 2 tablets daily      Prenatal Vit-Fe Fumarate-FA (PRENATAL VITAMINS PO) Take by mouth         PMH/      Past Medical History:   Diagnosis Date    Abnormal Pap smear of cervix     Allergic     Impacted teeth     Resolved 10/12/2017     Ventricular septal defect        PSH/      Past Surgical History:   Procedure Laterality Date    WISDOM TOOTH EXTRACTION         ObHx/    :      OB History    Para Term  AB Living   1 0 0 0 0 0   SAB IAB Ectopic Multiple Live Births   0 0 0 0 0      # Outcome Date GA Lbr Jacob/2nd Weight Sex Delivery Anes PTL Lv   1 Current                GynHx/    There is no history of sexually-transmitted infections. She is sexually active with male partners. She has had 1 sexual partner(s) in the past 12 months. FH/    She was born  and had a "hole in the heart" that closed spontaneously. Family History   Problem Relation Age of Onset    Hypertension Mother     Endometriosis Mother     No Known Problems Father     Hypertension Maternal Grandmother     Diabetes Maternal Grandmother     Kidney failure Maternal Grandmother     Leukemia Maternal Grandfather     Heart disease Paternal Grandfather      SH/    She is . She is a homemaker. .    She does not use tobacco, alcohol, or illicit drugs. Social History     Socioeconomic History    Marital status: /Civil Union     Spouse name: Not on file    Number of children: Not on file    Years of education: Not on file    Highest education level: Not on file   Occupational History    Occupation: Student   Tobacco Use    Smoking status: Never     Passive exposure: Never    Smokeless tobacco: Never   Vaping Use    Vaping Use: Never used   Substance and Sexual Activity    Alcohol use: Not Currently    Drug use: Never    Sexual activity: Yes     Partners: Male     Birth control/protection: Condom Male   Other Topics Concern    Not on file   Social History Narrative    Not on file     Social Determinants of Health     Financial Resource Strain: Not on file   Food Insecurity: Not on file   Transportation Needs: Not on file   Physical Activity: Inactive (11/27/2019)    Exercise Vital Sign     Days of Exercise per Week: 0 days     Minutes of Exercise per Session: 0 min   Stress: No Stress Concern Present (11/27/2019)    [address] [place] [address]     Feeling of Stress : Only a little   Social Connections: Not on file   Intimate Partner Violence: Not At Risk (10/10/2022)    Humiliation, Afraid, Rape, and Kick questionnaire     Fear of Current or Ex-Partner: No     Emotionally Abused: No     Physically Abused: No     Sexually Abused: No   Housing Stability: Not on file       RoS/    Constitutional: Negative    CV: Negative    Pulm: Negative    GI: Negative    Urinary: Negative    Neuro: Negative    Musculoskeletal: Negative    O/  /81 (BP Location: Left arm)   Pulse 69   Temp 97.6 °F (36.4 °C) (Temporal)   Resp 18   Ht 5' 3" (1.6 m)   Wt 87.5 kg (193 lb)   LMP 02/22/2023 (Exact Date)   BMI 34.19 kg/m²     Alert, comfortable, no acute distress    Regular rate and rhythm    Clear to auscultation bilaterally    Abdomen soft, nontender, nondistended.     Fundus nontender, size consistent with dates      Cephalic      EFW [id number]    Gyn/      Normal female external genitalia. Pelvis adequate/gynecoid. Cervix:           Dilation: Closed         Effacement: 0%         Station: Floating  Consistency: Firm         Position: Posterior   Presentation: Vertex    FHR: 125 moderate variability.  (+) accels, no decels. [place]:  No apparent contractions.     Ultrasound:  Cephalic    Prenatal labs/  Lab Results   Component Value Date    ABO O 05/01/2023    RH Positive 05/01/2023    ABS Normal 05/01/2023    HGB 10.5 (L) 11/08/2023     11/08/2023    EXTRUBELIGGQ immune 05/01/2023    RPR Non Reactive 09/12/2023    HEPBSAG negative 05/01/2023    HEPCAB Non Reactive 05/01/2023    HIVAGAB Non-Reactive 05/01/2023    GC negative 04/26/2023    [id number] 140 (H) 09/12/2023     GBS Negative

## 2023-11-10 ENCOUNTER — ANESTHESIA EVENT (INPATIENT)
Dept: ANESTHESIOLOGY | Facility: HOSPITAL | Age: 23
End: 2023-11-10
Payer: COMMERCIAL

## 2023-11-10 ENCOUNTER — ANESTHESIA (INPATIENT)
Dept: ANESTHESIOLOGY | Facility: HOSPITAL | Age: 23
End: 2023-11-10
Payer: COMMERCIAL

## 2023-11-10 LAB
BASE EXCESS BLDCOV CALC-SCNC: -1.8 MMOL/L (ref 1–9)
ERYTHROCYTE [DISTWIDTH] IN BLOOD BY AUTOMATED COUNT: 14.7 % (ref 11.6–15.1)
HCO3 BLDCOV-SCNC: 23.7 MMOL/L (ref 12.2–28.6)
HCT VFR BLD AUTO: 25.9 % (ref 34.8–46.1)
HGB BLD-MCNC: 8.2 G/DL (ref 11.5–15.4)
MCH RBC QN AUTO: 26.5 PG (ref 26.8–34.3)
MCHC RBC AUTO-ENTMCNC: 31.7 G/DL (ref 31.4–37.4)
MCV RBC AUTO: 84 FL (ref 82–98)
OXYHGB MFR BLDCOV: 52.2 %
PCO2 BLDCOV: 42.8 MM HG (ref 27–43)
PH BLDCOV: 7.36 [PH] (ref 7.19–7.49)
PLATELET # BLD AUTO: 195 THOUSANDS/UL (ref 149–390)
PMV BLD AUTO: 11.1 FL (ref 8.9–12.7)
PO2 BLDCOV: 21.7 MM HG (ref 15–45)
RBC # BLD AUTO: 3.1 MILLION/UL (ref 3.81–5.12)
SAO2 % BLDCOV: 11.8 ML/DL
WBC # BLD AUTO: 23.62 THOUSAND/UL (ref 4.31–10.16)

## 2023-11-10 PROCEDURE — 85027 COMPLETE CBC AUTOMATED: CPT | Performed by: OBSTETRICS & GYNECOLOGY

## 2023-11-10 PROCEDURE — 0KQM0ZZ REPAIR PERINEUM MUSCLE, OPEN APPROACH: ICD-10-PCS | Performed by: OBSTETRICS & GYNECOLOGY

## 2023-11-10 PROCEDURE — 88307 TISSUE EXAM BY PATHOLOGIST: CPT | Performed by: PATHOLOGY

## 2023-11-10 PROCEDURE — 82805 BLOOD GASES W/O2 SATURATION: CPT | Performed by: OBSTETRICS & GYNECOLOGY

## 2023-11-10 PROCEDURE — 59400 OBSTETRICAL CARE: CPT | Performed by: OBSTETRICS & GYNECOLOGY

## 2023-11-10 PROCEDURE — 10907ZC DRAINAGE OF AMNIOTIC FLUID, THERAPEUTIC FROM PRODUCTS OF CONCEPTION, VIA NATURAL OR ARTIFICIAL OPENING: ICD-10-PCS | Performed by: OBSTETRICS & GYNECOLOGY

## 2023-11-10 RX ORDER — DIAPER,BRIEF,INFANT-TODD,DISP
1 EACH MISCELLANEOUS DAILY PRN
Status: DISCONTINUED | OUTPATIENT
Start: 2023-11-10 | End: 2023-11-12 | Stop reason: HOSPADM

## 2023-11-10 RX ORDER — CALCIUM CARBONATE 500 MG/1
1000 TABLET, CHEWABLE ORAL DAILY PRN
Status: DISCONTINUED | OUTPATIENT
Start: 2023-11-10 | End: 2023-11-12 | Stop reason: HOSPADM

## 2023-11-10 RX ORDER — DOCUSATE SODIUM 100 MG/1
100 CAPSULE, LIQUID FILLED ORAL 2 TIMES DAILY
Status: DISCONTINUED | OUTPATIENT
Start: 2023-11-10 | End: 2023-11-12 | Stop reason: HOSPADM

## 2023-11-10 RX ORDER — ACETAMINOPHEN 325 MG/1
650 TABLET ORAL EVERY 6 HOURS PRN
Status: DISCONTINUED | OUTPATIENT
Start: 2023-11-10 | End: 2023-11-10

## 2023-11-10 RX ORDER — OXYCODONE HYDROCHLORIDE 5 MG/1
5 TABLET ORAL
Status: DISCONTINUED | OUTPATIENT
Start: 2023-11-10 | End: 2023-11-12 | Stop reason: HOSPADM

## 2023-11-10 RX ORDER — TRANEXAMIC ACID 10 MG/ML
INJECTION, SOLUTION INTRAVENOUS
Status: COMPLETED
Start: 2023-11-10 | End: 2023-11-10

## 2023-11-10 RX ORDER — FENTANYL CITRATE 50 UG/ML
INJECTION, SOLUTION INTRAMUSCULAR; INTRAVENOUS AS NEEDED
Status: SHIPPED | OUTPATIENT
Start: 2023-11-10

## 2023-11-10 RX ORDER — LIDOCAINE HYDROCHLORIDE AND EPINEPHRINE 15; 5 MG/ML; UG/ML
INJECTION, SOLUTION EPIDURAL AS NEEDED
Status: SHIPPED | OUTPATIENT
Start: 2023-11-10

## 2023-11-10 RX ORDER — ONDANSETRON 2 MG/ML
4 INJECTION INTRAMUSCULAR; INTRAVENOUS EVERY 8 HOURS PRN
Status: DISCONTINUED | OUTPATIENT
Start: 2023-11-10 | End: 2023-11-12 | Stop reason: HOSPADM

## 2023-11-10 RX ORDER — CARBOPROST TROMETHAMINE 250 UG/ML
250 INJECTION, SOLUTION INTRAMUSCULAR
Status: DISCONTINUED | OUTPATIENT
Start: 2023-11-10 | End: 2023-11-12

## 2023-11-10 RX ORDER — OXYTOCIN/RINGER'S LACTATE 30/500 ML
250 PLASTIC BAG, INJECTION (ML) INTRAVENOUS ONCE
Status: DISCONTINUED | OUTPATIENT
Start: 2023-11-10 | End: 2023-11-12

## 2023-11-10 RX ORDER — TRANEXAMIC ACID 10 MG/ML
1000 INJECTION, SOLUTION INTRAVENOUS ONCE
Status: COMPLETED | OUTPATIENT
Start: 2023-11-10 | End: 2023-11-10

## 2023-11-10 RX ORDER — KETOROLAC TROMETHAMINE 30 MG/ML
30 INJECTION, SOLUTION INTRAMUSCULAR; INTRAVENOUS ONCE
Status: COMPLETED | OUTPATIENT
Start: 2023-11-10 | End: 2023-11-10

## 2023-11-10 RX ORDER — DEXTROSE, SODIUM CHLORIDE, SODIUM LACTATE, POTASSIUM CHLORIDE, AND CALCIUM CHLORIDE 5; .6; .31; .03; .02 G/100ML; G/100ML; G/100ML; G/100ML; G/100ML
125 INJECTION, SOLUTION INTRAVENOUS CONTINUOUS
Status: DISCONTINUED | OUTPATIENT
Start: 2023-11-10 | End: 2023-11-12

## 2023-11-10 RX ORDER — MISOPROSTOL 200 UG/1
TABLET ORAL
Status: COMPLETED
Start: 2023-11-10 | End: 2023-11-10

## 2023-11-10 RX ORDER — BUPIVACAINE HYDROCHLORIDE 2.5 MG/ML
INJECTION, SOLUTION EPIDURAL; INFILTRATION; INTRACAUDAL AS NEEDED
Status: SHIPPED | OUTPATIENT
Start: 2023-11-10

## 2023-11-10 RX ORDER — ACETAMINOPHEN 325 MG/1
650 TABLET ORAL EVERY 4 HOURS PRN
Status: DISCONTINUED | OUTPATIENT
Start: 2023-11-10 | End: 2023-11-12 | Stop reason: HOSPADM

## 2023-11-10 RX ORDER — LIDOCAINE HYDROCHLORIDE 10 MG/ML
INJECTION, SOLUTION EPIDURAL; INFILTRATION; INTRACAUDAL; PERINEURAL AS NEEDED
Status: SHIPPED | OUTPATIENT
Start: 2023-11-10

## 2023-11-10 RX ORDER — IBUPROFEN 600 MG/1
600 TABLET ORAL EVERY 6 HOURS
Status: DISCONTINUED | OUTPATIENT
Start: 2023-11-10 | End: 2023-11-12 | Stop reason: HOSPADM

## 2023-11-10 RX ORDER — MISOPROSTOL 200 UG/1
800 TABLET ORAL ONCE
Status: COMPLETED | OUTPATIENT
Start: 2023-11-10 | End: 2023-11-10

## 2023-11-10 RX ORDER — CARBOPROST TROMETHAMINE 250 UG/ML
INJECTION, SOLUTION INTRAMUSCULAR
Status: COMPLETED
Start: 2023-11-10 | End: 2023-11-10

## 2023-11-10 RX ADMIN — BUPIVACAINE HYDROCHLORIDE 7 ML: 2.5 INJECTION, SOLUTION EPIDURAL; INFILTRATION; INTRACAUDAL at 07:33

## 2023-11-10 RX ADMIN — LIDOCAINE HYDROCHLORIDE AND EPINEPHRINE 2 ML: 15; 5 INJECTION, SOLUTION EPIDURAL at 00:59

## 2023-11-10 RX ADMIN — FENTANYL CITRATE 100 MCG: 50 INJECTION, SOLUTION INTRAMUSCULAR; INTRAVENOUS at 07:33

## 2023-11-10 RX ADMIN — SODIUM CHLORIDE, SODIUM LACTATE, POTASSIUM CHLORIDE, AND CALCIUM CHLORIDE 125 ML/HR: .6; .31; .03; .02 INJECTION, SOLUTION INTRAVENOUS at 05:08

## 2023-11-10 RX ADMIN — DEXTROSE, SODIUM CHLORIDE, SODIUM LACTATE, POTASSIUM CHLORIDE, AND CALCIUM CHLORIDE 999 ML/HR: 5; .6; .31; .03; .02 INJECTION, SOLUTION INTRAVENOUS at 12:50

## 2023-11-10 RX ADMIN — LIDOCAINE HYDROCHLORIDE AND EPINEPHRINE 3 ML: 15; 5 INJECTION, SOLUTION EPIDURAL at 00:57

## 2023-11-10 RX ADMIN — Medication 125 MILLI-UNITS/MIN: at 10:36

## 2023-11-10 RX ADMIN — DEXTROSE, SODIUM CHLORIDE, SODIUM LACTATE, POTASSIUM CHLORIDE, AND CALCIUM CHLORIDE 125 ML/HR: 5; .6; .31; .03; .02 INJECTION, SOLUTION INTRAVENOUS at 13:57

## 2023-11-10 RX ADMIN — AMPICILLIN SODIUM AND SULBACTAM SODIUM 3 G: 2; 1 INJECTION, POWDER, FOR SOLUTION INTRAMUSCULAR; INTRAVENOUS at 11:41

## 2023-11-10 RX ADMIN — IBUPROFEN 600 MG: 600 TABLET, FILM COATED ORAL at 18:47

## 2023-11-10 RX ADMIN — DEXTROSE, SODIUM CHLORIDE, SODIUM LACTATE, POTASSIUM CHLORIDE, AND CALCIUM CHLORIDE 999 ML/HR: 5; .6; .31; .03; .02 INJECTION, SOLUTION INTRAVENOUS at 17:43

## 2023-11-10 RX ADMIN — MISOPROSTOL 800 MCG: 200 TABLET ORAL at 10:39

## 2023-11-10 RX ADMIN — TRANEXAMIC ACID 1000 MG: 10 INJECTION, SOLUTION INTRAVENOUS at 10:20

## 2023-11-10 RX ADMIN — WITCH HAZEL 1 PAD: 500 SOLUTION RECTAL; TOPICAL at 14:22

## 2023-11-10 RX ADMIN — HYDROCORTISONE 1 APPLICATION: 1 CREAM TOPICAL at 14:22

## 2023-11-10 RX ADMIN — KETOROLAC TROMETHAMINE 30 MG: 30 INJECTION, SOLUTION INTRAMUSCULAR; INTRAVENOUS at 12:54

## 2023-11-10 RX ADMIN — CARBOPROST TROMETHAMINE 250 MCG: 250 INJECTION, SOLUTION INTRAMUSCULAR at 10:31

## 2023-11-10 RX ADMIN — DEXTROSE, SODIUM CHLORIDE, SODIUM LACTATE, POTASSIUM CHLORIDE, AND CALCIUM CHLORIDE 125 ML/HR: 5; .6; .31; .03; .02 INJECTION, SOLUTION INTRAVENOUS at 08:05

## 2023-11-10 RX ADMIN — ROPIVACAINE HYDROCHLORIDE: 2 INJECTION, SOLUTION EPIDURAL; INFILTRATION at 01:18

## 2023-11-10 RX ADMIN — LIDOCAINE HYDROCHLORIDE 2 ML: 10 INJECTION, SOLUTION EPIDURAL; INFILTRATION; INTRACAUDAL; PERINEURAL at 01:03

## 2023-11-10 RX ADMIN — SODIUM CHLORIDE, SODIUM LACTATE, POTASSIUM CHLORIDE, AND CALCIUM CHLORIDE 125 ML/HR: .6; .31; .03; .02 INJECTION, SOLUTION INTRAVENOUS at 00:27

## 2023-11-10 RX ADMIN — BENZOCAINE AND LEVOMENTHOL 1 APPLICATION: 200; 5 SPRAY TOPICAL at 14:22

## 2023-11-10 RX ADMIN — ONDANSETRON 4 MG: 2 INJECTION INTRAMUSCULAR; INTRAVENOUS at 12:19

## 2023-11-10 NOTE — PLAN OF CARE
Problem: BIRTH - VAGINAL/ SECTION  Goal: Fetal and maternal status remain reassuring during the birth process  Description: INTERVENTIONS:  - Monitor vital signs  - Monitor fetal heart rate  - Monitor uterine activity  - Monitor labor progression (vaginal delivery)  - DVT prophylaxis  - Antibiotic prophylaxis  Outcome: Completed  Goal: Emotionally satisfying birthing experience for mother/fetus  Description: Interventions:  - Assess, plan, implement and evaluate the nursing care given to the patient in labor  - Advocate the philosophy that each childbirth experience is a unique experience and support the family's chosen level of involvement and control during the labor process   - Actively participate in both the patient's and family's teaching of the birth process  - Consider cultural, Jain and age-specific factors and plan care for the patient in labor  Outcome: Completed     Problem: POSTPARTUM  Goal: Experiences normal postpartum course  Description: INTERVENTIONS:  - Monitor maternal vital signs  - Assess uterine involution and lochia  Outcome: Progressing  Goal: Appropriate maternal -  bonding  Description: INTERVENTIONS:  - Identify family support  - Assess for appropriate maternal/infant bonding   -Encourage maternal/infant bonding opportunities  - Referral to  or  as needed  Outcome: Progressing  Goal: Establishment of infant feeding pattern  Description: INTERVENTIONS:  - Assess breast/bottle feeding  - Refer to lactation as needed  Outcome: Progressing  Goal: Incision(s), wounds(s) or drain site(s) healing without S/S of infection  Description: INTERVENTIONS  - Assess and document dressing, incision, wound bed, drain sites and surrounding tissue  - Provide patient and family education  Outcome: Progressing     Problem: PAIN - ADULT  Goal: Verbalizes/displays adequate comfort level or baseline comfort level  Description: Interventions:  - Encourage patient to monitor pain and request assistance  - Assess pain using appropriate pain scale  - Administer analgesics based on type and severity of pain and evaluate response  - Implement non-pharmacological measures as appropriate and evaluate response  - Consider cultural and social influences on pain and pain management  - Notify physician/advanced practitioner if interventions unsuccessful or patient reports new pain  Outcome: Progressing     Problem: INFECTION - ADULT  Goal: Absence or prevention of progression during hospitalization  Description: INTERVENTIONS:  - Assess and monitor for signs and symptoms of infection  - Monitor lab/diagnostic results  - Monitor all insertion sites, i.e. indwelling lines, tubes, and drains  - Monitor endotracheal if appropriate and nasal secretions for changes in amount and color  - [place] appropriate cooling/warming therapies per order  - Administer medications as ordered  - Instruct and encourage patient and family to use good hand hygiene technique  - Identify and instruct in appropriate isolation precautions for identified infection/condition  Outcome: Progressing  Goal: Absence of fever/infection during neutropenic period  Description: INTERVENTIONS:  - Monitor WBC    Outcome: Progressing     Problem: SAFETY ADULT  Goal: Patient will remain free of falls  Description: INTERVENTIONS:  - Educate patient/family on patient safety including physical limitations  - Instruct patient to call for assistance with activity   - Consult OT/PT to assist with strengthening/mobility   - Keep Call bell within reach  - Keep bed low and locked with side rails adjusted as appropriate  - Keep care items and personal belongings within reach  - Initiate and maintain comfort rounds  - Make Fall Risk Sign visible to staff  - Apply yellow socks and bracelet for high fall risk patients  - Consider moving patient to room near nurses station  Outcome: Progressing  Goal: Maintain or return to baseline ADL function  Description: INTERVENTIONS:  -  Assess patient's ability to carry out ADLs; assess patient's baseline for ADL function and identify physical deficits which impact ability to perform ADLs (bathing, care of mouth/teeth, toileting, grooming, dressing, etc.)  - Assess/evaluate cause of self-care deficits   - Assess range of motion  - Assess patient's mobility; develop plan if impaired  - Assess patient's need for assistive devices and provide as appropriate  - Encourage maximum independence but intervene and supervise when necessary  - Involve family in performance of ADLs  - Assess for home care needs following discharge   - Consider OT consult to assist with ADL evaluation and planning for discharge  - Provide patient education as appropriate  Outcome: Progressing  Goal: Maintains/Returns to pre admission functional level  Description: INTERVENTIONS:  - Perform BMAT or MOVE assessment daily.   - Set and communicate daily mobility goal to care team and patient/family/caregiver. - Collaborate with rehabilitation services on mobility goals if consulted  - Out of bed for toileting  - Record patient progress and toleration of activity level   Outcome: Progressing     Problem: Knowledge Deficit  Goal: Patient/family/caregiver demonstrates understanding of disease process, treatment plan, medications, and discharge instructions  Description: Complete learning assessment and assess knowledge base.   Interventions:  - Provide teaching at level of understanding  - Provide teaching via preferred learning methods  Outcome: Progressing     Problem: DISCHARGE PLANNING  Goal: Discharge to home or other facility with appropriate resources  Description: INTERVENTIONS:  - Identify barriers to discharge w/patient and caregiver  - Arrange for needed discharge resources and transportation as appropriate  - Identify discharge learning needs (meds, wound care, etc.)  - Arrange for interpretive services to assist at discharge as needed  - Refer to Case Management Department for coordinating discharge planning if the patient needs post-hospital services based on physician/advanced practitioner order or complex needs related to functional status, cognitive ability, or social support system  Outcome: Progressing

## 2023-11-10 NOTE — OB LABOR/OXYTOCIN SAFETY PROGRESS
Labor Progress Note - Sebastian Valencia 20 y.o. female MRN: [id number]    Unit/Bed#: -01 Encounter: [id number]    Dose (jimmie-units/min) Oxytocin: 10 jimmie-units/min  Contraction Frequency (minutes): 2-3  Contraction Quality: Moderate  Tachysystole: No     Cervical Dilation: 4  Cervical Effacement: 80  Fetal Station: -2    FHT baseline: 130bpm  Variability: moderate  Accels: present  Decels: Absent  Category 1      Vital Signs:   Vitals:    11/10/23 0513   BP: 127/65   Pulse:    Resp:    Temp: 97.6 °F (36.4 °C)   SpO2:        Notes/comments:     AROM'd about 3 hours ago. Pitocin now at 10mU/min. Still latent labor. Comfortable with epidural and napping. Continue pitocin induction.     Shanice Ortiz MD 11/10/2023 5:18 AM

## 2023-11-10 NOTE — OB LABOR/OXYTOCIN SAFETY PROGRESS
Oxytocin Safety Progress Check Note - Cristina Guzmán 20 y.o. female MRN: [id number]    Unit/Bed#: -01 Encounter: [id number]    Dose (jimmie-units/min) Oxytocin: 12 jimmie-units/min  Contraction Frequency (minutes): 2-3  Contraction Quality: Moderate  Tachysystole: No   Cervical Dilation: 10  Dilation Complete Date: 11/10/23  Dilation Complete Time: 0911  Cervical Effacement: 100  Fetal Station: 0  Baseline Rate: 125 bpm  Fetal Heart Rate: 128 BPM  FHR Category: I     Vital Signs:   Vitals:    11/10/23 0831   BP: 132/65   Pulse: 60   Resp:    Temp:    SpO2:        Notes/comments:   Patient complete.   Will start [address], DO 11/10/2023 9:26 AM

## 2023-11-10 NOTE — LACTATION NOTE
This note was copied from a baby's chart. Met with parents to discuss feeding plan. Mother discussed that she would like to breastfeed. Baby did not feed within hour of birth due to maternal complications. Mother was offered encouragement to latch, awaken, but decline lactation support at this time. Primary RN in the room during encounter. The Ready, Set, Baby Booklet was discussed. Discussed importance of skin to skin to help baby awaken for breastfeeding, to help with milk production as well as stabilize temperature, blood sugars, decrease pain, promote relaxation, and calm the baby as well as for bonding that father may do as well. Showed images of tummy size progression as milk production increases to meet the nutritional/growing needs of the baby. Discussed alternative feeding methods as a manner to provide baby with additional colostrum/breast milk if baby is sleepy and/or unable to breastfeed directly to help protect the milk supply and preserve latching abilities at the breast. Mother was encouraged to hand express after feedings to provide "dessert" and when sleepy to hand express to provide "snacks" which could help baby to awaken for a feeding. Discussed “Second Night Syndrome” explaining how baby’s cluster feeds to meet growing needs. Growth spurts periods were discussed within the first year and how cluster feeding helps boost milk supply. Explained feeding cues and fullness cues as well as importance of obtaining a deep latch for effective milk removal and proper positioning (tummy to tummy, at level, nose to nipple, bring chin to breast first and bringing baby to breast) with ear, shoulder, and hip alignment. Addressed breast pump needs and mother discussed that she has a double breast pump for home use. Parents were made aware of how to communicate with lactation and encouraged to reach out continued support and/or questions that arise.

## 2023-11-10 NOTE — OB LABOR/OXYTOCIN SAFETY PROGRESS
Labor Progress Note - Marc Remedies 20 y.o. female MRN: [id number]    Unit/Bed#: -01 Encounter: [id number]    Dose (jimmie-units/min) Oxytocin: 10 jimmie-units/min  Contraction Frequency (minutes): 1.5-2 (irritable)  Contraction Quality: Moderate  Tachysystole: No     Cervical Dilation: 6  Cervical Effacement: 90  Fetal Station: -2    FHT baseline: 130bpm  Variability: moderate  Accels: present  Decels: [place]  Category 1        Vital Signs:   Vitals:    11/10/23 0716   BP: 141/74   Pulse: 76   Resp:    Temp:    SpO2:        Notes/comments:     Patient feeling more back pain with contractions, has tried bolus button. Cervix now 6cm. Will get epidural bolus from anesthesia. Will switch fluids to 1L D5LR.       Conley Boast, MD 11/10/2023 7:17 AM

## 2023-11-10 NOTE — ANESTHESIA PREPROCEDURE EVALUATION
Procedure:  LABOR ANALGESIA    Relevant Problems   CARDIO  HX VSD . Closed w/o surgery in infancy. (+) Pre-eclampsia      GYN   (+) 37 weeks gestation of pregnancy   No back problems   Hx of Locked Jaw needed  sedation in ED 2017  Physical Exam    Airway  Comment: Limited Oral opening  Mallampati score: II  TM Distance: >3 FB  Neck ROM: full     Dental       Cardiovascular      Pulmonary      Other Findings        Anesthesia Plan  ASA Score- 2     Anesthesia Type- epidural with ASA Monitors. Additional Monitors:     Airway Plan:            Plan Factors-Exercise tolerance (METS): >4 METS. Chart reviewed. Patient is not a current smoker. Induction-     Postoperative Plan-     Informed Consent- Anesthetic plan and risks discussed with patient. Lab Results   Component Value Date    GLUC 82 11/08/2023    GLUF 94 09/15/2023    ALT 15 11/08/2023    AST 17 11/08/2023    BUN 6 11/08/2023    CALCIUM 9.2 11/08/2023     11/08/2023    CO2 22 11/08/2023    CREATININE 0.54 (L) 11/08/2023    HCT 32.8 (L) 11/08/2023    HGB 10.5 (L) 11/08/2023     11/08/2023    K 3.1 (L) 11/08/2023    WBC 11.59 (H) 11/08/2023     Epidural discussed with patient. Side effects, including post dural puncture headache discussed. All questions answered. Consent given by patient.

## 2023-11-10 NOTE — OB LABOR/OXYTOCIN SAFETY PROGRESS
Labor Progress Note - Farhan Cornea 20 y.o. female MRN: [id number]    Unit/Bed#: -01 Encounter: [id number]    Dose (jimmie-units/min) Oxytocin: 4 jimmie-units/min  Contraction Frequency (minutes): 2-2.5 (irritability)  Contraction Quality: Mild  Tachysystole: No     Cervical Dilation: 4  Cervical Effacement: 70  Fetal Station: -2    FHT baseline: 130bpm  Variability: moderate  Accels: present  Decels: Absent  Category 1        Vital Signs:   Vitals:    11/09/23 2307   BP: 148/79   Pulse: 75   Resp:    Temp:    SpO2:        Notes/comments:     Pt with cont every 2-3 minutes. She is feeling them, more when on her back. Checked cervix, some bloody show and about 4cm. Exam still very painful for pt. Discussed getting her more comfortable with an epidural and then AROM, as she is too uncomfortable with exam to do now. She agrees.     Adelaida Jamison MD 11/10/2023 12:12 AM

## 2023-11-10 NOTE — OB LABOR/OXYTOCIN SAFETY PROGRESS
Labor Progress Note - Marily Lin 20 y.o. female MRN: [id number]    Unit/Bed#: -01 Encounter: [id number]    Dose (jimmie-units/min) Oxytocin: 6 jimmie-units/min  Contraction Frequency (minutes): 2.5-3  Contraction Quality: Moderate  Tachysystole: No     Cervical Dilation: 4  Cervical Effacement: 80  Fetal Station: -2    FHT baseline: 130bpm  Variability: moderate  Accels: present  Decels: Absent  Category 1        Vital Signs:   Vitals:    11/10/23 0157   BP: 131/67   Pulse: 75   Resp:    Temp:    SpO2: 99%       Notes/comments:     Comfortable with epidural.  Not feeling contractions. Exam much better. AROM'd for clear fluid. Will continue pitocin induction. All questions answered.       Troy Bob MD 11/10/2023 2:20 AM

## 2023-11-10 NOTE — OB LABOR/OXYTOCIN SAFETY PROGRESS
Labor Progress Note - Narcisa Jarvis 20 y.o. female MRN: [id number]    Unit/Bed#: -01 Encounter: [id number]    Dose (jimmie-units/min) Oxytocin: 4 jimmie-units/min  Contraction Frequency (minutes): 2-2.5  Contraction Quality: Mild  Tachysystole: No     Cervical Dilation: 3  Cervical Effacement: 70  Fetal Station: -3      FHT baseline: 130bpm  Variability: moderate  Accels: present  Decels: Absent  Category 1        Vital Signs:   Vitals:    11/09/23 2044   BP: 146/84   Pulse: 61   Resp:    Temp:    SpO2:        Notes/comments:     Linn every 2-3 minutes with pitocin. Just received another dose of Nubain (2nd dose). Continue induction. Epidural prn. AROM when able.       Mary Polanco MD 11/9/2023 9:06 PM

## 2023-11-10 NOTE — L&D DELIVERY NOTE
Vaginal Delivery Summary - OB/GYN   Izzy Chairez 20 y.o. female MRN: [id number]  Unit/Bed#: -01 Encounter: [id number]    Predelivery Diagnosis:  1. Pregnancy at 37w2d  2. Term pregnancy  Single fetus   3. Pre-eclampsia without severe features    Postdelivery Diagnosis:  1. Same as above  2. Delivery of term   3. 2nd degree laceration  4. Uterine atony with hemorrhage    Procedure: Spontaneous Vaginal Delivery, repair of 2nd degree laceration    Attending: Dr. David Pennington    Anesthesia: Epidural    QBL: 737 cc  Admission Hg:   Recent Labs     23   HGB 10.5*     Admission platelets:   Recent Labs     23            Complications: none apparent    Specimens: cord blood, arterial and venous cord blood gasses, placenta to pathology    Findings:   1. Viable male at 1, with APGARS of 9 and 9 at 1 and 5 minutes respectively,  2. Spontaneous delivery of intact placenta at 1009  3. 2nd degree laceration repaired with 2-0 Vicryl on CT and 3-0 Vicryl on SH needle  4. Blood gases:    Umbilical Cord Venous Blood Gas:  Results from last 7 days   Lab Units 11/10/23  1006   PH COV  7.361   PCO2 COV mm HG 42.8   HCO3 COV mmol/L 23.7   BASE EXC COV mmol/L -1.8*   O2 CT CD VB mL/dL 11.8   O2 HGB, VENOUS CORD % 19.5     Umbilical Cord Arterial Blood Gas:        Disposition:  Patient tolerated the procedure well and was recovering in labor and delivery room     Brief History and Labor Course:    Izzy Chairez is a 20 y.o. Maldonado Owens with an ROB of 2023, by Last Menstrual Period at 37w2d gestation who was admitted for Induction of labor. Please see labor timeline for complete labor course. The patient was completely dilated at 0911. She began to push at 0936. Description of procedure    After pushing for 27 minutes, at 1003 patient delivered a viable male , wt pending, apgars of 9 (1 min) and 9 (5 min). The fetal vertex delivered spontaneously.  The baby was palpated for a nuchal cord and none was palpated. .  The anterior shoulder delivered atraumatically with maternal expulsive forces and the assistance of downward traction. The posterior shoulder delivered with maternal expulsive forces and the assistance of upward traction. The remainder of the fetus delivered spontaneously. Upon delivery, the infant was placed on the maternal abdomen and delayed cord clamping was performed. The umbilical cord was then doubly clamped and cut. The infant was noted to cry spontaneously and was moving all extremities appropriately. Arterial and venous cord blood gases and cord blood was collected for analysis. These were promptly sent to the lab. In the immediate post-partum, 30 units of IV pitocin was administered, and the uterus was noted to contract down well with massage and pitocin. The placenta delivered spontaneously at 1009 and was noted to have a centrally inserted 3 vessel cord. Uterine atony with hemorrhage noted with Pitocin running. Bimanual exam done and no retained products noted. Tranexamic Acid 1000 mg I.V. given, followed by Hemabate 250 mcg I.M. Uterus noted to be firm and then boggy again with increase in bleeding. Cytotec 800 mcg given sub-lingually. Uterus noted to be firm. One dose of Unasyn 3 g I.V. ordered as prophylaxis after bimanual exam x 2. The vagina, cervix, perineum, and rectum were inspected and there was noted to be a 2nd degree perineal laceration  The laceration was repaired with 2-0 Vicryl on CT and 3-0 Vicryl on SH needle,  Good hemostasis was noted. At the conclusion of the procedure, all needle, sponge, and instrument counts were noted to be correct. Patient tolerated the procedure well and was allowed to recover in labor and delivery room with family and  before being transferred to the post-partum floor.      Karen Estrada DO

## 2023-11-10 NOTE — OB LABOR/OXYTOCIN SAFETY PROGRESS
Labor Progress Note - Cristina Guzmán 20 y.o. female MRN: [id number]    Unit/Bed#: -01 Encounter: [id number]       Contraction Frequency (minutes): irritable  Contraction Quality: Mild  Tachysystole: No     Cervical Dilation: 3  Cervical Effacement: 70  Fetal Station: -3    Baseline Rate: 130 bpm  Fetal Heart Rate: 130 BPM  FHR Category: 1           Vital Signs:   Vitals:    11/09/23 1902   BP: 164/79   Pulse: 58   Resp:    Temp:    SpO2:        Notes/comments:       Mata balloon expelled. Cervix now 3cm. Will start pitocin induction. Patient and  agree. Blood pressure after exam in severe range. Pt very nervous. Will follow closely. May have nubain again if wishes. Epidural prn. Will AROM when able.     Christiane Barbosa MD 11/9/2023 7:07 PM

## 2023-11-10 NOTE — PLAN OF CARE
Problem: BIRTH - VAGINAL/ SECTION  Goal: Fetal and maternal status remain reassuring during the birth process  Description: INTERVENTIONS:  - Monitor vital signs  - Monitor fetal heart rate  - Monitor uterine activity  - Monitor labor progression (vaginal delivery)  - DVT prophylaxis  - Antibiotic prophylaxis  Outcome: Progressing  Goal: Emotionally satisfying birthing experience for mother/fetus  Description: Interventions:  - Assess, plan, implement and evaluate the nursing care given to the patient in labor  - Advocate the philosophy that each childbirth experience is a unique experience and support the family's chosen level of involvement and control during the labor process   - Actively participate in both the patient's and family's teaching of the birth process  - Consider cultural, [employer] and age-specific factors and plan care for the patient in labor  Outcome: Progressing     Problem: PAIN - ADULT  Goal: Verbalizes/displays adequate comfort level or baseline comfort level  Description: Interventions:  - Encourage patient to monitor pain and request assistance  - Assess pain using appropriate pain scale  - Administer analgesics based on type and severity of pain and evaluate response  - Implement non-pharmacological measures as appropriate and evaluate response  - Consider cultural and social influences on pain and pain management  - Notify physician/advanced practitioner if interventions unsuccessful or patient reports new pain  Outcome: Progressing     Problem: INFECTION - ADULT  Goal: Absence or prevention of progression during hospitalization  Description: INTERVENTIONS:  - Assess and monitor for signs and symptoms of infection  - Monitor lab/diagnostic results  - Monitor all insertion sites, i.e. indwelling lines, tubes, and drains  - Monitor endotracheal if appropriate and nasal secretions for changes in amount and color  - [place] appropriate cooling/warming therapies per order  - Administer medications as ordered  - Instruct and encourage patient and family to use good hand hygiene technique  - Identify and instruct in appropriate isolation precautions for identified infection/condition  Outcome: Progressing  Goal: Absence of fever/infection during neutropenic period  Description: INTERVENTIONS:  - Monitor WBC    Outcome: Progressing     Problem: SAFETY ADULT  Goal: Patient will remain free of falls  Description: INTERVENTIONS:  - Educate patient/family on patient safety including physical limitations  - Instruct patient to call for assistance with activity   - Consult OT/PT to assist with strengthening/mobility   - Keep Call bell within reach  - Keep bed low and locked with side rails adjusted as appropriate  - Keep care items and personal belongings within reach  - Initiate and maintain comfort rounds  - Make Fall Risk Sign visible to staff  - Apply yellow socks and bracelet for high fall risk patients  - Consider moving patient to room near nurses station  Outcome: Progressing  Goal: Maintain or return to baseline ADL function  Description: INTERVENTIONS:  -  Assess patient's ability to carry out ADLs; assess patient's baseline for ADL function and identify physical deficits which impact ability to perform ADLs (bathing, care of mouth/teeth, toileting, grooming, dressing, etc.)  - Assess/evaluate cause of self-care deficits   - Assess range of motion  - Assess patient's mobility; develop plan if impaired  - Assess patient's need for assistive devices and provide as appropriate  - Encourage maximum independence but intervene and supervise when necessary  - Involve family in performance of ADLs  - Assess for home care needs following discharge   - Consider OT consult to assist with ADL evaluation and planning for discharge  - Provide patient education as appropriate  Outcome: Progressing  Goal: Maintains/Returns to pre admission functional level  Description: INTERVENTIONS:  - Perform BMAT or MOVE assessment daily.   - Set and communicate daily mobility goal to care team and patient/family/caregiver. - Collaborate with rehabilitation services on mobility goals if consulted  - Out of bed for toileting  - Record patient progress and toleration of activity level   Outcome: Progressing     Problem: Knowledge Deficit  Goal: Patient/family/caregiver demonstrates understanding of disease process, treatment plan, medications, and discharge instructions  Description: Complete learning assessment and assess knowledge base.   Interventions:  - Provide teaching at level of understanding  - Provide teaching via preferred learning methods  Outcome: Progressing     Problem: DISCHARGE PLANNING  Goal: Discharge to home or other facility with appropriate resources  Description: INTERVENTIONS:  - Identify barriers to discharge w/patient and caregiver  - Arrange for needed discharge resources and transportation as appropriate  - Identify discharge learning needs (meds, wound care, etc.)  - Arrange for interpretive services to assist at discharge as needed  - Refer to Case Management Department for coordinating discharge planning if the patient needs post-hospital services based on physician/advanced practitioner order or complex needs related to functional status, cognitive ability, or social support system  Outcome: Progressing

## 2023-11-10 NOTE — ANESTHESIA PROCEDURE NOTES
Epidural Block    Patient location during procedure: OB/L&D  Start time: 11/10/2023 12:45 AM  Reason for block: procedure for pain and at surgeon's request  Staffing  Performed by: Shane Caban MD  Authorized by: Shane Caban MD    Preanesthetic Checklist  Completed: patient identified, IV checked, risks and benefits discussed, surgical consent, monitors and equipment checked, pre-op evaluation and timeout performed  Epidural  Patient position: sitting  Prep: Betadine and site prepped and draped  Sedation Level: no sedation  Patient monitoring: continuous pulse oximetry, frequent blood pressure checks and heart rate  Approach: midline  Location: lumbar, L4-5  Injection technique: JEREMIE air  Needle  Needle type: Tuohy   Needle gauge: 17 G  Needle insertion depth: 6 cm  Catheter type: side hole  Catheter size: 19 G  Catheter at skin depth: 12 cm  Catheter securement method: clear occlusive dressing, stabilization device and tape  Test dose: negative  Assessment  Number of attempts: 1negative aspiration for CSF, negative aspiration for heme and no paresthesia on injection  patient tolerated the procedure well with no immediate complications  Additional Notes  Epidural inserted w/o incident. After no asp CSF or heme, 3 mls. N/S injected easily through the Epi. Needle. Epidural catheter inserted w/o paresthesia. Catheter secured and  taped. PT. supine ,back elevated. Slight L hip wedge applied.

## 2023-11-10 NOTE — ANESTHESIA POSTPROCEDURE EVALUATION
Post-Op Assessment Note    CV Status:  Stable  Pain Score: 0    Pain management: adequate     Mental Status:  Alert and awake   Hydration Status:  Euvolemic   PONV Controlled:  Controlled   Airway Patency:  Patent      Post Op Vitals Reviewed: Yes      Staff: CRNA     Post-op block assessment: no complications and catheter intact    No notable events documented.     BP      Temp      Pulse    Resp      SpO2

## 2023-11-11 LAB
ALBUMIN SERPL BCP-MCNC: 2.4 G/DL (ref 3.5–5)
ALP SERPL-CCNC: 131 U/L (ref 34–104)
ALT SERPL W P-5'-P-CCNC: 9 U/L (ref 7–52)
ANION GAP SERPL CALCULATED.3IONS-SCNC: 6 MMOL/L
AST SERPL W P-5'-P-CCNC: 18 U/L (ref 13–39)
BILIRUB SERPL-MCNC: 0.21 MG/DL (ref 0.2–1)
BUN SERPL-MCNC: 11 MG/DL (ref 5–25)
CALCIUM ALBUM COR SERPL-MCNC: 9.1 MG/DL (ref 8.3–10.1)
CALCIUM SERPL-MCNC: 7.8 MG/DL (ref 8.4–10.2)
CHLORIDE SERPL-SCNC: 108 MMOL/L (ref 96–108)
CO2 SERPL-SCNC: 23 MMOL/L (ref 21–32)
CREAT SERPL-MCNC: 0.62 MG/DL (ref 0.6–1.3)
ERYTHROCYTE [DISTWIDTH] IN BLOOD BY AUTOMATED COUNT: 15 % (ref 11.6–15.1)
GFR SERPL CREATININE-BSD FRML MDRD: 125 ML/MIN/1.73SQ M
GLUCOSE SERPL-MCNC: 91 MG/DL (ref 65–140)
HCT VFR BLD AUTO: 18.2 % (ref 34.8–46.1)
HGB BLD-MCNC: 5.9 G/DL (ref 11.5–15.4)
MCH RBC QN AUTO: 26.8 PG (ref 26.8–34.3)
MCHC RBC AUTO-ENTMCNC: 32.4 G/DL (ref 31.4–37.4)
MCV RBC AUTO: 83 FL (ref 82–98)
PLATELET # BLD AUTO: 166 THOUSANDS/UL (ref 149–390)
PMV BLD AUTO: 11.9 FL (ref 8.9–12.7)
POTASSIUM SERPL-SCNC: 3 MMOL/L (ref 3.5–5.3)
PROT SERPL-MCNC: 4.4 G/DL (ref 6.4–8.4)
RBC # BLD AUTO: 2.2 MILLION/UL (ref 3.81–5.12)
SODIUM SERPL-SCNC: 137 MMOL/L (ref 135–147)
WBC # BLD AUTO: 16.15 THOUSAND/UL (ref 4.31–10.16)

## 2023-11-11 PROCEDURE — 85027 COMPLETE CBC AUTOMATED: CPT | Performed by: OBSTETRICS & GYNECOLOGY

## 2023-11-11 PROCEDURE — P9016 RBC LEUKOCYTES REDUCED: HCPCS

## 2023-11-11 PROCEDURE — 99024 POSTOP FOLLOW-UP VISIT: CPT | Performed by: OBSTETRICS & GYNECOLOGY

## 2023-11-11 PROCEDURE — 30233N1 TRANSFUSION OF NONAUTOLOGOUS RED BLOOD CELLS INTO PERIPHERAL VEIN, PERCUTANEOUS APPROACH: ICD-10-PCS | Performed by: OBSTETRICS & GYNECOLOGY

## 2023-11-11 PROCEDURE — 80053 COMPREHEN METABOLIC PANEL: CPT | Performed by: OBSTETRICS & GYNECOLOGY

## 2023-11-11 RX ADMIN — IBUPROFEN 600 MG: 600 TABLET, FILM COATED ORAL at 14:03

## 2023-11-11 RX ADMIN — DOCUSATE SODIUM 100 MG: 100 CAPSULE, LIQUID FILLED ORAL at 17:42

## 2023-11-11 RX ADMIN — DOCUSATE SODIUM 100 MG: 100 CAPSULE, LIQUID FILLED ORAL at 08:28

## 2023-11-11 RX ADMIN — IBUPROFEN 600 MG: 600 TABLET, FILM COATED ORAL at 08:07

## 2023-11-11 RX ADMIN — IBUPROFEN 600 MG: 600 TABLET, FILM COATED ORAL at 00:22

## 2023-11-11 RX ADMIN — IBUPROFEN 600 MG: 600 TABLET, FILM COATED ORAL at 19:55

## 2023-11-11 NOTE — PLAN OF CARE
Problem: PAIN - ADULT  Goal: Verbalizes/displays adequate comfort level or baseline comfort level  Description: Interventions:  - Encourage patient to monitor pain and request assistance  - Assess pain using appropriate pain scale  - Administer analgesics based on type and severity of pain and evaluate response  - Implement non-pharmacological measures as appropriate and evaluate response  - Consider cultural and social influences on pain and pain management  - Notify physician/advanced practitioner if interventions unsuccessful or patient reports new pain  Outcome: Progressing     Problem: INFECTION - ADULT  Goal: Absence or prevention of progression during hospitalization  Description: INTERVENTIONS:  - Assess and monitor for signs and symptoms of infection  - Monitor lab/diagnostic results  - Monitor all insertion sites, i.e. indwelling lines, tubes, and drains  - Monitor endotracheal if appropriate and nasal secretions for changes in amount and color  - [place] appropriate cooling/warming therapies per order  - Administer medications as ordered  - Instruct and encourage patient and family to use good hand hygiene technique  - Identify and instruct in appropriate isolation precautions for identified infection/condition  Outcome: Progressing  Goal: Absence of fever/infection during neutropenic period  Description: INTERVENTIONS:  - Monitor WBC    Outcome: Progressing     Problem: SAFETY ADULT  Goal: Patient will remain free of falls  Description: INTERVENTIONS:  - Educate patient/family on patient safety including physical limitations  - Instruct patient to call for assistance with activity   - Consult OT/PT to assist with strengthening/mobility   - Keep Call bell within reach  - Keep bed low and locked with side rails adjusted as appropriate  - Keep care items and personal belongings within reach  - Initiate and maintain comfort rounds  - Make Fall Risk Sign visible to staff  - Apply yellow socks and bracelet for high fall risk patients  - Consider moving patient to room near nurses station  Outcome: Progressing  Goal: Maintain or return to baseline ADL function  Description: INTERVENTIONS:  -  Assess patient's ability to carry out ADLs; assess patient's baseline for ADL function and identify physical deficits which impact ability to perform ADLs (bathing, care of mouth/teeth, toileting, grooming, dressing, etc.)  - Assess/evaluate cause of self-care deficits   - Assess range of motion  - Assess patient's mobility; develop plan if impaired  - Assess patient's need for assistive devices and provide as appropriate  - Encourage maximum independence but intervene and supervise when necessary  - Involve family in performance of ADLs  - Assess for home care needs following discharge   - Consider OT consult to assist with ADL evaluation and planning for discharge  - Provide patient education as appropriate  Outcome: Progressing  Goal: Maintains/Returns to pre admission functional level  Description: INTERVENTIONS:  - Perform BMAT or MOVE assessment daily.   - Set and communicate daily mobility goal to care team and patient/family/caregiver. - Collaborate with rehabilitation services on mobility goals if consulted  - Out of bed for toileting  - Record patient progress and toleration of activity level   Outcome: Progressing     Problem: Knowledge Deficit  Goal: Patient/family/caregiver demonstrates understanding of disease process, treatment plan, medications, and discharge instructions  Description: Complete learning assessment and assess knowledge base.   Interventions:  - Provide teaching at level of understanding  - Provide teaching via preferred learning methods  Outcome: Progressing     Problem: DISCHARGE PLANNING  Goal: Discharge to home or other facility with appropriate resources  Description: INTERVENTIONS:  - Identify barriers to discharge w/patient and caregiver  - Arrange for needed discharge resources and transportation as appropriate  - Identify discharge learning needs (meds, wound care, etc.)  - Arrange for interpretive services to assist at discharge as needed  - Refer to Case Management Department for coordinating discharge planning if the patient needs post-hospital services based on physician/advanced practitioner order or complex needs related to functional status, cognitive ability, or social support system  Outcome: Progressing     Problem: POSTPARTUM  Goal: Experiences normal postpartum course  Description: INTERVENTIONS:  - Monitor maternal vital signs  - Assess uterine involution and lochia  Outcome: Progressing  Goal: Appropriate maternal -  bonding  Description: INTERVENTIONS:  - Identify family support  - Assess for appropriate maternal/infant bonding   -Encourage maternal/infant bonding opportunities  - Referral to  or  as needed  Outcome: Progressing  Goal: Establishment of infant feeding pattern  Description: INTERVENTIONS:  - Assess breast/bottle feeding  - Refer to lactation as needed  Outcome: Progressing  Goal: Incision(s), wounds(s) or drain site(s) healing without S/S of infection  Description: INTERVENTIONS  - Assess and document dressing, incision, wound bed, drain sites and surrounding tissue  - Provide patient and family education  Outcome: Progressing

## 2023-11-11 NOTE — QUICK NOTE
Update:    HR 77, /76    Minimal lochia now. Hgb 5.9g/dL    Met with Caitlyn Calzada and her . Reviewed result with them, implications. Recommended transfusion and discussed with them the risks, benefits, and alternatives to this. Risks discussed included, but were not limited to the risk of transmission of blood-borne infections and the risk of transfusion reaction. Following the discussion of R/B/A, all of Steve's questions were answered, and she stated her strong desire to proceed with transfusion, formalizing this decision by signing a consent for blood transfusion.    --> Transfuse 2uPRBC.     Jose Gray MD

## 2023-11-11 NOTE — PROGRESS NOTES
Progress Note - OB/GYN  Post-Partum Physician Note   Cristina Guzmán 20 y.o. female MRN: [id number]  Unit/Bed#:  206-01 Encounter: [id number]    Patient is postpartum day #1 following SAVD     Subjective:   Pain: minimal  Tolerating Oral Intake: yes  Voiding: yes  Ambulating: yes  Breastfeeding: yes  Chest Pain: no  Shortness of Breath: no  Leg Pain/Discomfort: no  Lochia: minimal now    Objective:   Vitals:    11/11/23 0830 11/11/23 0900 11/11/23 1000 11/11/23 1035   BP: 140/76 132/77 136/91 129/100   BP Location:       Pulse: 85 80 79 82   Resp: 18 18 18 17   Temp: 97.8 °F (36.6 °C) 97.7 °F (36.5 °C) 97.7 °F (36.5 °C) 98 °F (36.7 °C)   TempSrc: Oral Oral Oral Oral   SpO2: 97% 98% 100% 98%   Weight:       Height:           Intake/Output Summary (Last 24 hours) at 11/11/2023 1044  Last data filed at 11/11/2023 1035  Gross per 24 hour   Intake 3147.27 ml   Output 450 ml   Net 2697.27 ml       Physical Exam:  General: in no apparent distress, well developed and well nourished, non-toxic, in no respiratory distress and acyanotic, alert, oriented times 3, afebrile, and normal vitals  Abdomen: abdomen is soft without significant tenderness, masses, organomegaly or guarding  Fundus: Firm and non-tender, 1 below the umbilicus  Lower extremeties: nontender    Labs/Tests:   Lab Results   Component Value Date/Time    HGB 5.9 (LL) 11/11/2023 06:02 AM    HGB 8.2 (L) 11/10/2023 01:55 PM    HGB 10.5 (L) 11/08/2023 08:47 PM     11/11/2023 06:02 AM     11/10/2023 01:55 PM     11/08/2023 08:47 PM    WBC 16.15 (H) 11/11/2023 06:02 AM    WBC 23.62 (H) 11/10/2023 01:55 PM    WBC 11.59 (H) 11/08/2023 08:47 PM    CREATININE 0.62 11/11/2023 06:02 AM    CREATININE 0.54 (L) 11/08/2023 08:47 PM    ALT 9 11/11/2023 06:02 AM    ALT 15 11/08/2023 08:47 PM    AST 18 11/11/2023 06:02 AM    AST 17 11/08/2023 08:47 PM    UTPCR 0.41 (H) 11/08/2023 08:47 PM        Brief OB Lab review:  ABO Grouping   Date Value Ref Range Status   11/08/2023 O  Final      Rh Factor   Date Value Ref Range Status   11/08/2023 Positive  Final     Rh Type   Date Value Ref Range Status   05/01/2023 Positive  Final     Comment:     Please note: Prior records for this patient's ABO / Rh type are not  available for additional verification.       No results found for: "ANTIBODYSCR"  No results found for: "RUBM"    MEDS:   Current Facility-Administered Medications   Medication Dose Route Frequency    acetaminophen (TYLENOL) tablet 650 mg  650 mg Oral Q4H PRN    benzocaine-menthol-lanolin-aloe (DERMOPLAST) 20-0.5 % topical spray 1 Application  1 Application Topical P4R PRN    bupivacaine (PF) (MARCAINE) 0.25 % injection 30 mL  30 mL Infiltration Once PRN    calcium carbonate (TUMS) chewable tablet 1,000 mg  1,000 mg Oral Daily PRN    carboprost (HEMABATE) IM injection 250 mcg  250 mcg Intramuscular Q15 Min PRN    dextrose 5 % in lactated Ringer's infusion  125 mL/hr Intravenous Continuous    docusate sodium (COLACE) capsule 100 mg  100 mg Oral BID    hydrocortisone 1 % cream 1 Application  1 Application Topical Daily PRN    ibuprofen (MOTRIN) tablet 600 mg  600 mg Oral Q6H    nalbuphine (NUBAIN) injection 5 mg  5 mg Intravenous Q3H PRN    ondansetron (ZOFRAN) injection 4 mg  4 mg Intravenous Q8H PRN    oxyCODONE (ROXICODONE) IR tablet 5 mg  5 mg Oral Q3H PRN    oxytocin (PITOCIN) 30 Units in lactated ringers 500 mL infusion  1-30 jimmie-units/min Intravenous Titrated    oxytocin (PITOCIN) 30 Units in lactated ringers 500 mL infusion  250 jimmie-units/min Intravenous Once    ropivacaine 0.2% PCEA   Epidural Continuous    witch hazel-glycerin (TUCKS) topical pad 1 Pad  1 Pad Topical Q4H PRN     Facility-Administered Medications Ordered in Other Encounters   Medication Dose Route Frequency    bupivacaine (PF) (MARCAINE) 0.25 % injection   Epidural PRN    fentanyl citrate (PF) 100 MCG/2ML   Epidural PRN    lidocaine (PF) (XYLOCAINE-MPF) 1 % injection   Epidural PRN lidocaine-epinephrine (XYLOCAINE-MPF/EPINEPHRINE) 1.5 %-1:200,000 injection   Epidural PRN     Invasive Devices       Peripheral Intravenous Line  Duration             Peripheral IV 11/10/23 Right;Ventral (anterior) Forearm <1 day                    Assessment and Plan:  20y.o. year-old , postpartum day #1 s/p SAVD    Continue routine postpartum care  Encourage ambulation  Advance diet as tolerated  Receiving 2 UPRBC      Maternal anemia with delivery, with current postpartum complication  Admission Hgb 10.5. 3 hours PP 8.2 and PPD#1 5.9. 2 UPRBC given. Will check CBC in the AM as she is reporting minimal bleeding at this time.       Brian Campos MD  2023 10:44 AM

## 2023-11-11 NOTE — ASSESSMENT & PLAN NOTE
Admission Hgb 10.5. 3 hours PP 8.2 and PPD#1 5.9. 2 UPRBC given. Will check CBC in the AM as she is reporting minimal bleeding at this time.

## 2023-11-11 NOTE — PLAN OF CARE
Problem: PAIN - ADULT  Goal: Verbalizes/displays adequate comfort level or baseline comfort level  Description: Interventions:  - Encourage patient to monitor pain and request assistance  - Assess pain using appropriate pain scale  - Administer analgesics based on type and severity of pain and evaluate response  - Implement non-pharmacological measures as appropriate and evaluate response  - Consider cultural and social influences on pain and pain management  - Notify physician/advanced practitioner if interventions unsuccessful or patient reports new pain  Outcome: Progressing     Problem: INFECTION - ADULT  Goal: Absence or prevention of progression during hospitalization  Description: INTERVENTIONS:  - Assess and monitor for signs and symptoms of infection  - Monitor lab/diagnostic results  - Monitor all insertion sites, i.e. indwelling lines, tubes, and drains  - Monitor endotracheal if appropriate and nasal secretions for changes in amount and color  - [place] appropriate cooling/warming therapies per order  - Administer medications as ordered  - Instruct and encourage patient and family to use good hand hygiene technique  - Identify and instruct in appropriate isolation precautions for identified infection/condition  Outcome: Progressing  Goal: Absence of fever/infection during neutropenic period  Description: INTERVENTIONS:  - Monitor WBC    Outcome: Progressing     Problem: SAFETY ADULT  Goal: Patient will remain free of falls  Description: INTERVENTIONS:  - Educate patient/family on patient safety including physical limitations  - Instruct patient to call for assistance with activity   - Consult OT/PT to assist with strengthening/mobility   - Keep Call bell within reach  - Keep bed low and locked with side rails adjusted as appropriate  - Keep care items and personal belongings within reach  - Initiate and maintain comfort rounds  - Make Fall Risk Sign visible to staff  - Apply yellow socks and bracelet for high fall risk patients  - Consider moving patient to room near nurses station  Outcome: Progressing  Goal: Maintain or return to baseline ADL function  Description: INTERVENTIONS:  -  Assess patient's ability to carry out ADLs; assess patient's baseline for ADL function and identify physical deficits which impact ability to perform ADLs (bathing, care of mouth/teeth, toileting, grooming, dressing, etc.)  - Assess/evaluate cause of self-care deficits   - Assess range of motion  - Assess patient's mobility; develop plan if impaired  - Assess patient's need for assistive devices and provide as appropriate  - Encourage maximum independence but intervene and supervise when necessary  - Involve family in performance of ADLs  - Assess for home care needs following discharge   - Consider OT consult to assist with ADL evaluation and planning for discharge  - Provide patient education as appropriate  Outcome: Progressing  Goal: Maintains/Returns to pre admission functional level  Description: INTERVENTIONS:  - Perform BMAT or MOVE assessment daily.   - Set and communicate daily mobility goal to care team and patient/family/caregiver. - Collaborate with rehabilitation services on mobility goals if consulted  - Out of bed for toileting  - Record patient progress and toleration of activity level   Outcome: Progressing     Problem: Knowledge Deficit  Goal: Patient/family/caregiver demonstrates understanding of disease process, treatment plan, medications, and discharge instructions  Description: Complete learning assessment and assess knowledge base.   Interventions:  - Provide teaching at level of understanding  - Provide teaching via preferred learning methods  Outcome: Progressing     Problem: DISCHARGE PLANNING  Goal: Discharge to home or other facility with appropriate resources  Description: INTERVENTIONS:  - Identify barriers to discharge w/patient and caregiver  - Arrange for needed discharge resources and transportation as appropriate  - Identify discharge learning needs (meds, wound care, etc.)  - Arrange for interpretive services to assist at discharge as needed  - Refer to Case Management Department for coordinating discharge planning if the patient needs post-hospital services based on physician/advanced practitioner order or complex needs related to functional status, cognitive ability, or social support system  Outcome: Progressing     Problem: POSTPARTUM  Goal: Experiences normal postpartum course  Description: INTERVENTIONS:  - Monitor maternal vital signs  - Assess uterine involution and lochia  Outcome: Progressing  Goal: Appropriate maternal -  bonding  Description: INTERVENTIONS:  - Identify family support  - Assess for appropriate maternal/infant bonding   -Encourage maternal/infant bonding opportunities  - Referral to  or  as needed  Outcome: Progressing  Goal: Establishment of infant feeding pattern  Description: INTERVENTIONS:  - Assess breast/bottle feeding  - Refer to lactation as needed  Outcome: Progressing  Goal: Incision(s), wounds(s) or drain site(s) healing without S/S of infection  Description: INTERVENTIONS  - Assess and document dressing, incision, wound bed, drain sites and surrounding tissue  - Provide patient and family education  Outcome: Progressing

## 2023-11-12 VITALS
HEART RATE: 67 BPM | DIASTOLIC BLOOD PRESSURE: 90 MMHG | HEIGHT: 63 IN | BODY MASS INDEX: 34.2 KG/M2 | TEMPERATURE: 98 F | WEIGHT: 193 LBS | SYSTOLIC BLOOD PRESSURE: 149 MMHG | OXYGEN SATURATION: 98 % | RESPIRATION RATE: 18 BRPM

## 2023-11-12 LAB
ABO GROUP BLD BPU: NORMAL
ABO GROUP BLD BPU: NORMAL
BPU ID: NORMAL
BPU ID: NORMAL
CROSSMATCH: NORMAL
CROSSMATCH: NORMAL
ERYTHROCYTE [DISTWIDTH] IN BLOOD BY AUTOMATED COUNT: 15.1 % (ref 11.6–15.1)
HCT VFR BLD AUTO: 25.6 % (ref 34.8–46.1)
HGB BLD-MCNC: 8.3 G/DL (ref 11.5–15.4)
MCH RBC QN AUTO: 26.8 PG (ref 26.8–34.3)
MCHC RBC AUTO-ENTMCNC: 32.4 G/DL (ref 31.4–37.4)
MCV RBC AUTO: 83 FL (ref 82–98)
PLATELET # BLD AUTO: 178 THOUSANDS/UL (ref 149–390)
PMV BLD AUTO: 11.1 FL (ref 8.9–12.7)
RBC # BLD AUTO: 3.1 MILLION/UL (ref 3.81–5.12)
UNIT DISPENSE STATUS: NORMAL
UNIT DISPENSE STATUS: NORMAL
UNIT PRODUCT CODE: NORMAL
UNIT PRODUCT CODE: NORMAL
UNIT PRODUCT VOLUME: 300 ML
UNIT PRODUCT VOLUME: 350 ML
UNIT RH: NORMAL
UNIT RH: NORMAL
WBC # BLD AUTO: 14.52 THOUSAND/UL (ref 4.31–10.16)

## 2023-11-12 PROCEDURE — NC001 PR NO CHARGE: Performed by: OBSTETRICS & GYNECOLOGY

## 2023-11-12 PROCEDURE — 85027 COMPLETE CBC AUTOMATED: CPT | Performed by: OBSTETRICS & GYNECOLOGY

## 2023-11-12 PROCEDURE — 99024 POSTOP FOLLOW-UP VISIT: CPT | Performed by: OBSTETRICS & GYNECOLOGY

## 2023-11-12 RX ORDER — FUROSEMIDE 10 MG/ML
20 INJECTION INTRAMUSCULAR; INTRAVENOUS ONCE
Status: COMPLETED | OUTPATIENT
Start: 2023-11-12 | End: 2023-11-12

## 2023-11-12 RX ORDER — NIFEDIPINE 30 MG/1
30 TABLET, EXTENDED RELEASE ORAL DAILY
Qty: 30 TABLET | Refills: 0 | Status: SHIPPED | OUTPATIENT
Start: 2023-11-13

## 2023-11-12 RX ORDER — IBUPROFEN 600 MG/1
600 TABLET ORAL EVERY 6 HOURS
Qty: 30 TABLET | Refills: 0 | Status: SHIPPED | OUTPATIENT
Start: 2023-11-12

## 2023-11-12 RX ORDER — DOCUSATE SODIUM 100 MG/1
100 CAPSULE, LIQUID FILLED ORAL 2 TIMES DAILY PRN
Qty: 60 CAPSULE | Refills: 0 | Status: SHIPPED | OUTPATIENT
Start: 2023-11-12

## 2023-11-12 RX ORDER — FERROUS SULFATE 324(65)MG
324 TABLET, DELAYED RELEASE (ENTERIC COATED) ORAL
Qty: 60 TABLET | Refills: 0 | Status: SHIPPED | OUTPATIENT
Start: 2023-11-12

## 2023-11-12 RX ORDER — NIFEDIPINE 30 MG/1
30 TABLET, EXTENDED RELEASE ORAL DAILY
Status: DISCONTINUED | OUTPATIENT
Start: 2023-11-12 | End: 2023-11-12 | Stop reason: HOSPADM

## 2023-11-12 RX ADMIN — FUROSEMIDE 20 MG: 10 INJECTION, SOLUTION INTRAMUSCULAR; INTRAVENOUS at 08:16

## 2023-11-12 RX ADMIN — IBUPROFEN 600 MG: 600 TABLET, FILM COATED ORAL at 08:24

## 2023-11-12 RX ADMIN — NIFEDIPINE 30 MG: 30 TABLET, FILM COATED, EXTENDED RELEASE ORAL at 08:24

## 2023-11-12 RX ADMIN — IBUPROFEN 600 MG: 600 TABLET, FILM COATED ORAL at 01:51

## 2023-11-12 RX ADMIN — DOCUSATE SODIUM 100 MG: 100 CAPSULE, LIQUID FILLED ORAL at 08:16

## 2023-11-12 NOTE — PLAN OF CARE
Problem: POSTPARTUM  Goal: Experiences normal postpartum course  Description: INTERVENTIONS:  - Monitor maternal vital signs  - Assess uterine involution and lochia  2023 by Mario Gillis RN  Outcome: Completed  2023 1234 by Mario Gillis RN  Outcome: Progressing  Goal: Appropriate maternal -  bonding  Description: INTERVENTIONS:  - Identify family support  - Assess for appropriate maternal/infant bonding   -Encourage maternal/infant bonding opportunities  - Referral to  or  as needed  2023 [phone] by Mario Gillis RN  Outcome: Completed  2023 1234 by Mario Gillis RN  Outcome: Progressing  Goal: Establishment of infant feeding pattern  Description: INTERVENTIONS:  - Assess breast/bottle feeding  - Refer to lactation as needed  2023 by Mario Gillis RN  Outcome: Completed  2023 1234 by Mario Gillis RN  Outcome: Progressing  Goal: Incision(s), wounds(s) or drain site(s) healing without S/S of infection  Description: INTERVENTIONS  - Assess and document dressing, incision, wound bed, drain sites and surrounding tissue  - Provide patient and family education  - Perform skin care/dressing changes every   2023 1437 by Mario Gillis RN  Outcome: Completed  2023 1234 by Mario Gillis RN  Outcome: Progressing     Problem: PAIN - ADULT  Goal: Verbalizes/displays adequate comfort level or baseline comfort level  Description: Interventions:  - Encourage patient to monitor pain and request assistance  - Assess pain using appropriate pain scale  - Administer analgesics based on type and severity of pain and evaluate response  - Implement non-pharmacological measures as appropriate and evaluate response  - Consider cultural and social influences on pain and pain management  - Notify physician/advanced practitioner if interventions unsuccessful or patient reports new pain  2023 by Mario Gillis RN  Outcome: Completed  2023 1234 by Feliciano Hernandez RN  Outcome: Progressing     Problem: INFECTION - ADULT  Goal: Absence or prevention of progression during hospitalization  Description: INTERVENTIONS:  - Assess and monitor for signs and symptoms of infection  - Monitor lab/diagnostic results  - Monitor all insertion sites, i.e. indwelling lines, tubes, and drains  - Monitor endotracheal if appropriate and nasal secretions for changes in amount and color  - [place] appropriate cooling/warming therapies per order  - Administer medications as ordered  - Instruct and encourage patient and family to use good hand hygiene technique  - Identify and instruct in appropriate isolation precautions for identified infection/condition  11/12/2023 1437 by Feliciano Hernandez RN  Outcome: Completed  11/12/2023 1234 by Feliciano Hernandez RN  Outcome: Progressing  Goal: Absence of fever/infection during neutropenic period  Description: INTERVENTIONS:  - Monitor WBC    11/12/2023 1437 by Feliciano Hernandez RN  Outcome: Completed  11/12/2023 1234 by Feliciano Hernandez RN  Outcome: Progressing     Problem: SAFETY ADULT  Goal: Patient will remain free of falls  Description: INTERVENTIONS:  - Educate patient/family on patient safety including physical limitations  - Instruct patient to call for assistance with activity   - Consult OT/PT to assist with strengthening/mobility   - Keep Call bell within reach  - Keep bed low and locked with side rails adjusted as appropriate  - Keep care items and personal belongings within reach  - Initiate and maintain comfort rounds  - Make Fall Risk Sign visible to staff      - Apply yellow socks and bracelet for high fall risk patients  - Consider moving patient to room near nurses station  11/12/2023 1437 by Feliciano Hernandez RN  Outcome: Completed  11/12/2023 1234 by Feliciano Hernandez RN  Outcome: Progressing  Goal: Maintain or return to baseline ADL function  Description: INTERVENTIONS:  -  Assess patient's ability to carry out ADLs; assess patient's baseline for ADL function and identify physical deficits which impact ability to perform ADLs (bathing, care of mouth/teeth, toileting, grooming, dressing, etc.)  - Assess/evaluate cause of self-care deficits   - Assess range of motion  - Assess patient's mobility; develop plan if impaired  - Assess patient's need for assistive devices and provide as appropriate  - Encourage maximum independence but intervene and supervise when necessary  - Involve family in performance of ADLs  - Assess for home care needs following discharge   - Consider OT consult to assist with ADL evaluation and planning for discharge  - Provide patient education as appropriate  11/12/2023 1437 by Moisés Oneill RN  Outcome: Completed  11/12/2023 1234 by Moisés Oneill RN  Outcome: Progressing  Goal: Maintains/Returns to pre admission functional level  Description: INTERVENTIONS:  - Perform BMAT or MOVE assessment daily.   - Set and communicate daily mobility goal to care team and patient/family/caregiver. - Collaborate with rehabilitation services on mobility goals if consulted      - Out of bed for toileting  - Record patient progress and toleration of activity level   11/12/2023 1437 by Moisés Oneill RN  Outcome: Completed  11/12/2023 1234 by Moisés Oneill RN  Outcome: Progressing     Problem: Knowledge Deficit  Goal: Patient/family/caregiver demonstrates understanding of disease process, treatment plan, medications, and discharge instructions  Description: Complete learning assessment and assess knowledge base.   Interventions:  - Provide teaching at level of understanding  - Provide teaching via preferred learning methods  11/12/2023 1437 by Moisés Oneill RN  Outcome: Completed  11/12/2023 1234 by Moisés Oneill RN  Outcome: Progressing     Problem: DISCHARGE PLANNING  Goal: Discharge to home or other facility with appropriate resources  Description: INTERVENTIONS:  - Identify barriers to discharge w/patient and caregiver  - Arrange for needed discharge resources and transportation as appropriate  - Identify discharge learning needs (meds, wound care, etc.)  - Arrange for interpretive services to assist at discharge as needed  - Refer to Case Management Department for coordinating discharge planning if the patient needs post-hospital services based on physician/advanced practitioner order or complex needs related to functional status, cognitive ability, or social support system  11/12/2023 1437 by Rubén Ambrocio RN  Outcome: Completed  11/12/2023 1234 by Rubén Ambrocio RN  Outcome: Progressing

## 2023-11-12 NOTE — PLAN OF CARE

## 2023-11-12 NOTE — PROGRESS NOTES
Ob Postpartum Note  Marc Remedies 20 y.o. female MRN: [id number]  Unit/Bed#: -01 Encounter: [id number]    A/P.  20 y.o. Chaz Irwin PPD#2 s/p delivery (Vaginal, Spontaneous ) at 37w2d of 2910 g (6 lb 6.7 oz)  male  , apgars 5 /9 .  (1) PPD#2. Delivered 11/10/2023 10:03 AM .  Doing very well. --> Discharge home. (2) PPH/Anemia. Minimal lochia. Hgb up to 8.3 after transfusion.  --> Home on oral iron    (3) Preeclampsia without severe features. Induction was for this indication. BP over the past 24 hours  elevated, all in "mild range."  Has swelling.  --> Lasix 20mg IV x 1.  --> Procardia XL 30mg po qDay.  --> Home BP monitoring, early office followup. Molly Yousif MD  23  ---------------------------------------------------------------------------------------------    Subjective/    Feels well. Tolerating po, ambulating, voiding without difficulty. Happy. Breastfeeding/bonding. Mild cramps, appropriate lochia. Objective/  Blood pressure 142/84, pulse 67, temperature 98 °F (36.7 °C), temperature source Oral, resp. rate 18, height 5' 3" (1.6 m), weight 87.5 kg (193 lb), last menstrual period 2023, SpO2 98 %, currently breastfeeding.     Patient Vitals for the past 24 hrs:   BP Temp Temp src Pulse Resp SpO2   23 0700 142/84 98 °F (36.7 °C) Oral 67 18 98 %   23 2300 137/81 97.5 °F (36.4 °C) Oral 71 18 97 %   23 1747 138/94 -- -- 67 17 --   23 1520 137/80 97.7 °F (36.5 °C) Oral 75 18 99 %   23 1450 130/79 97.7 °F (36.5 °C) Oral 80 17 99 %   23 1350 137/82 97.7 °F (36.5 °C) Oral 86 18 98 %   23 1320 145/64 97.8 °F (36.6 °C) Oral 86 18 97 %   23 1305 128/84 98.2 °F (36.8 °C) Oral 81 17 98 %   23 1225 144/89 97.7 °F (36.5 °C) Oral 86 18 100 %   23 1035 129/100 98 °F (36.7 °C) Oral 82 17 98 %   23 1000 136/91 97.7 °F (36.5 °C) Oral 79 18 100 %   23 0900 132/77 97.7 °F (36.5 °C) Oral 80 18 98 % 11/11/23 0830 140/76 97.8 °F (36.6 °C) Oral 85 18 97 %           Physical Exam/      General:  Alert, comfortable, NAD      Cardiovascular: Regular rate and rhythm      Respiratory: Clear to auscultation bilaterally. Abdomen: Soft, non-tender, non-distended      Fundus: Firm, below umbilicus, nontender      Extremities: Warm, non-tender. 2+ pitting edema.       Labs/      Blood type:  O+/-      Rubella: Immune      Lab Results   Component Value Date    WBC 14.52 (H) 11/12/2023    HGB 8.3 (L) 11/12/2023    HCT 25.6 (L) 11/12/2023    MCV 83 11/12/2023     11/12/2023

## 2023-11-12 NOTE — PLAN OF CARE
Problem: PAIN - ADULT  Goal: Verbalizes/displays adequate comfort level or baseline comfort level  Description: Interventions:  - Encourage patient to monitor pain and request assistance  - Assess pain using appropriate pain scale  - Administer analgesics based on type and severity of pain and evaluate response  - Implement non-pharmacological measures as appropriate and evaluate response  - Consider cultural and social influences on pain and pain management  - Notify physician/advanced practitioner if interventions unsuccessful or patient reports new pain  Outcome: Progressing     Problem: INFECTION - ADULT  Goal: Absence or prevention of progression during hospitalization  Description: INTERVENTIONS:  - Assess and monitor for signs and symptoms of infection  - Monitor lab/diagnostic results  - Monitor all insertion sites, i.e. indwelling lines, tubes, and drains  - Monitor endotracheal if appropriate and nasal secretions for changes in amount and color  - [place] appropriate cooling/warming therapies per order  - Administer medications as ordered  - Instruct and encourage patient and family to use good hand hygiene technique  - Identify and instruct in appropriate isolation precautions for identified infection/condition  Outcome: Progressing  Goal: Absence of fever/infection during neutropenic period  Description: INTERVENTIONS:  - Monitor WBC    Outcome: Progressing     Problem: SAFETY ADULT  Goal: Patient will remain free of falls  Description: INTERVENTIONS:  - Educate patient/family on patient safety including physical limitations  - Instruct patient to call for assistance with activity   - Consult OT/PT to assist with strengthening/mobility   - Keep Call bell within reach  - Keep bed low and locked with side rails adjusted as appropriate  - Keep care items and personal belongings within reach  - Initiate and maintain comfort rounds  - Make Fall Risk Sign visible to staff  - Apply yellow socks and bracelet for high fall risk patients  - Consider moving patient to room near nurses station  Outcome: Progressing  Goal: Maintain or return to baseline ADL function  Description: INTERVENTIONS:  -  Assess patient's ability to carry out ADLs; assess patient's baseline for ADL function and identify physical deficits which impact ability to perform ADLs (bathing, care of mouth/teeth, toileting, grooming, dressing, etc.)  - Assess/evaluate cause of self-care deficits   - Assess range of motion  - Assess patient's mobility; develop plan if impaired  - Assess patient's need for assistive devices and provide as appropriate  - Encourage maximum independence but intervene and supervise when necessary  - Involve family in performance of ADLs  - Assess for home care needs following discharge   - Consider OT consult to assist with ADL evaluation and planning for discharge  - Provide patient education as appropriate  Outcome: Progressing  Goal: Maintains/Returns to pre admission functional level  Description: INTERVENTIONS:  - Perform BMAT or MOVE assessment daily.   - Set and communicate daily mobility goal to care team and patient/family/caregiver. - Collaborate with rehabilitation services on mobility goals if consulted  - Out of bed for toileting  - Record patient progress and toleration of activity level   Outcome: Progressing     Problem: Knowledge Deficit  Goal: Patient/family/caregiver demonstrates understanding of disease process, treatment plan, medications, and discharge instructions  Description: Complete learning assessment and assess knowledge base.   Interventions:  - Provide teaching at level of understanding  - Provide teaching via preferred learning methods  Outcome: Progressing     Problem: DISCHARGE PLANNING  Goal: Discharge to home or other facility with appropriate resources  Description: INTERVENTIONS:  - Identify barriers to discharge w/patient and caregiver  - Arrange for needed discharge resources and transportation as appropriate  - Identify discharge learning needs (meds, wound care, etc.)  - Arrange for interpretive services to assist at discharge as needed  - Refer to Case Management Department for coordinating discharge planning if the patient needs post-hospital services based on physician/advanced practitioner order or complex needs related to functional status, cognitive ability, or social support system  Outcome: Progressing     Problem: POSTPARTUM  Goal: Experiences normal postpartum course  Description: INTERVENTIONS:  - Monitor maternal vital signs  - Assess uterine involution and lochia  Outcome: Progressing  Goal: Appropriate maternal -  bonding  Description: INTERVENTIONS:  - Identify family support  - Assess for appropriate maternal/infant bonding   -Encourage maternal/infant bonding opportunities  - Referral to  or  as needed  Outcome: Progressing  Goal: Establishment of infant feeding pattern  Description: INTERVENTIONS:  - Assess breast/bottle feeding  - Refer to lactation as needed  Outcome: Progressing  Goal: Incision(s), wounds(s) or drain site(s) healing without S/S of infection  Description: INTERVENTIONS  - Assess and document dressing, incision, wound bed, drain sites and surrounding tissue  - Provide patient and family education  Outcome: Progressing

## 2023-11-12 NOTE — DISCHARGE SUMMARY
Discharge Summary  Zhao Grubbs 20 y.o. female MRN: [id number]  Unit/Bed#: -01 Encounter: [id number]    Admission Date: 2023  Discharge Date: 23  Attending: Дмитрий Chiang DO    Admission Diagnosis:    (1) 20 y.o.  with pregnancy at 43w1d    (2) Preeclampsia without severe features    Discharge Diagnosis:    (1) 20 y.o.  status-post Vaginal, Spontaneous  on 11/10/2023     (2) 2910 g (6 lb 6.7 oz)  male  , apgars 9  / 9     (3) Preeclampsia without severe features. (4) Anemia    Procedures:    Delivery (Vaginal, Spontaneous ) 11/10/2023 10:03 AM     Hospital Course:    Zhao Grubbs is a 20 y.o. now  admitted 2023 at 43w1d with preeclampsia without severe features, for scheduled IoL. .    Induction was performed. She progressed along her labor curve, and delivered a viable 2910 g (6 lb 6.7 oz)  male   on 11/10/2023  via Vaginal, Spontaneous     There was a postpartum hemorrhage which responded to medical therapy. Her Hgb was 5.9 the following day. She was transfused 2u PRBC, and the Hgb came up to 8.2 afterwards. She is discharged on oral iron. She had lower-extremity edema that responded to a single dose of 20mg Lasix IV. BP postpartum were normal to "mild-range" elevated, and she was started on Procardia XL 30mg qDay. She underwent otherwise normal post delivery care and recovered well. On day of discharge 23, she is ambulating, voiding on her own, passing flatus, and tolerating oral intake. She has appropriate lochia and mild cramps. She has mild pain that is well controlled with only oral analgesics. She will do home BP monitoring, and follow up in 1 week for an in-office BP check. .    Complications:    None    Condition at discharge:    good    Discharge Medications: For a complete list of the patient's medications, please refer to her medical reconcillation report.     Discharge instructions/Information to patient and family:    See after visit summary for information provided to patient and family. Provisions for Follow-Up Care:    See after visit summary for information related to follow-up care and any pertinent home health orders. Disposition:    See After Visit Summary for discharge disposition information.     Planned Readmission:    Elizabeth Rousseau MD  11/12/23

## 2023-11-12 NOTE — NURSING NOTE
Discharge teaching done with patient and spouse. All questions answered, no additional questions at this time. Packet given, verbalized understanding.

## 2023-11-12 NOTE — DISCHARGE INSTR - AVS FIRST PAGE
--- Please check your blood pressure at home twice a day        Please call if your blood pressure is > 160 (top number) or > 110 (bottom number)        Please call if you have a headache that is not relieved by Tylenol        Please call if you have visual changes like those we discussed. Please call if you have persistent pain on your right side near your ribcage    --- Please call the office to schedule an appointment in one week for a blood pressure check.

## 2023-11-15 ENCOUNTER — TELEPHONE (OUTPATIENT)
Dept: OBGYN CLINIC | Facility: CLINIC | Age: 23
End: 2023-11-15

## 2023-11-15 NOTE — TELEPHONE ENCOUNTER
Patient called stating she passed a golf ball size clot 20 minutes before the calling On-call Ob/Gyn. Denies having passed any clots since. Denies having any episode of heavy vaginal bleeding since discharge. Took one Advil yesterday. Advised patient to monitor bleeding and inform Ob/Gyn if bleeding is heavier than a period or soaking a pad an hour. Take Advil 600 mg every 6 hours if lochia increases.   Encouraged to call Ob/Gyn if any concerns about bleeding

## 2023-11-16 ENCOUNTER — CLINICAL SUPPORT (OUTPATIENT)
Dept: OBGYN CLINIC | Facility: CLINIC | Age: 23
End: 2023-11-16

## 2023-11-16 VITALS
HEIGHT: 63 IN | SYSTOLIC BLOOD PRESSURE: 142 MMHG | DIASTOLIC BLOOD PRESSURE: 92 MMHG | BODY MASS INDEX: 30.65 KG/M2 | WEIGHT: 173 LBS

## 2023-11-16 DIAGNOSIS — O14.93 PRE-ECLAMPSIA IN THIRD TRIMESTER: Primary | ICD-10-CM

## 2023-11-16 NOTE — PROGRESS NOTES
Presents for PP B/P check post 11/10/2023 delivery-Pre E with out severe features. Currently taking procardia xl 30 daily in the AM>   B/P at home this week as follows: Monday 151/98,146/95. Tuesday 150/101, 147/97, Wed 147/97, 132/94  Thursday 138/101 at home, in office b/p's 158/94 with repeat of 142/92 Denies visual change, RUQ pain, headache or increased swelling. Reviewed B/P reading with Dr. Adam Olvera who recommends continue procardia xl30mg daily. Report any b/p reading 155/100, headache not relieved with tylenol, visual change, swelling, RUQ pain or any other concerns. Repeat B/P check in office on Monday. Patient verbalized understanding and agreement.

## 2023-11-17 ENCOUNTER — OFFICE VISIT (OUTPATIENT)
Dept: POSTPARTUM | Facility: CLINIC | Age: 23
End: 2023-11-17
Payer: COMMERCIAL

## 2023-11-17 VITALS — DIASTOLIC BLOOD PRESSURE: 90 MMHG | SYSTOLIC BLOOD PRESSURE: 130 MMHG

## 2023-11-17 PROCEDURE — 99404 PREV MED CNSL INDIV APPRX 60: CPT | Performed by: PEDIATRICS

## 2023-11-17 NOTE — PROGRESS NOTES
INITIAL BREAST FEEDING EVALUATION    Informant/Relationship: Elsi Sanchez    Discussion of General Lactation Issues: Reza Krishnan has had trouble latching since birth. Stevie Michaud was told that he has an "underbite" that is causing his feeding issues. Stevie Michaud has been pumping  and bottle feeding and supplementing with formula because she has been unable to express all of the milk that he needs. Infant is 9days old today.  History:  Fertility Problem:no  Breast changes:yes - breasts were a little larger and areolas were a little darker  : yes - induced due to maternal HTN  Full term: No 37 2/7 weeks   labor:no  First nursing/attempt < 1 hour after birth:yes - attempted in the delivery room but did not latch  Skin to skin following delivery:yes - for two hours in the delivery room  Breast changes after delivery:yes - breasts are a little robb.   Milk came in on day 4  Rooming in (infant in room with mother with exception of procedures, eg. Circumcision:  yes  Blood sugar issues:no  NICU stay:no  Jaundice:no  Phototherapy:no  Supplement given: (list supplement and method used as well as reason(s):yes - finger feeding expressed colostrum and syinge feeding    Past Medical History:   Diagnosis Date    Abnormal Pap smear of cervix     Allergic     Impacted teeth     Resolved 10/12/2017     Ventricular septal defect          Current Outpatient Medications:     benzocaine-menthol-lanolin-aloe (DERMOPLAST) 20-0.5 % topical spray, Apply 1 Application topically every 6 (six) hours as needed for irritation, Disp: 30 g, Rfl: 0    calcium carbonate (TUMS) 500 mg chewable tablet, Chew 2 tablets daily, Disp: , Rfl:     docusate sodium (COLACE) 100 mg capsule, Take 1 capsule (100 mg total) by mouth 2 (two) times a day as needed for constipation, Disp: 60 capsule, Rfl: 0    ferrous sulfate 324 (65 Fe) mg, Take 1 tablet (324 mg total) by mouth 2 (two) times a day before meals, Disp: 60 tablet, Rfl: 0 ibuprofen (MOTRIN) 600 mg tablet, Take 1 tablet (600 mg total) by mouth every 6 (six) hours, Disp: 30 tablet, Rfl: 0    NIFEdipine (PROCARDIA XL) 30 mg 24 hr tablet, Take 1 tablet (30 mg total) by mouth daily Do not start before November 13, 2023., Disp: 30 tablet, Rfl: 0    Prenatal Vit-Fe Fumarate-FA (PRENATAL VITAMINS PO), Take by mouth, Disp: , Rfl:     witch hazel-glycerin (TUCKS) topical pad, Apply 1 Pad topically every 4 (four) hours as needed for irritation, Disp: 30 each, Rfl: 0  No current facility-administered medications for this visit. Facility-Administered Medications Ordered in Other Visits:     bupivacaine (PF) (MARCAINE) 0.25 % injection, , Epidural, PRN, Mabel Moyer CRNA, 7 mL at 11/10/23 0733    fentanyl citrate (PF) 100 MCG/2ML, , Epidural, PRN, Mabel Moyer CRNA, 100 mcg at 11/10/23 0733    lidocaine (PF) (XYLOCAINE-MPF) 1 % injection, , Epidural, PRN, Mackenzie Serrano MD, 2 mL at 11/10/23 0103    lidocaine-epinephrine (XYLOCAINE-MPF/EPINEPHRINE) 1.5 %-1:200,000 injection, , Epidural, PRN, Mackenzie Serrano MD, 2 mL at 11/10/23 0059    Allergies   Allergen Reactions    Azithromycin Rash       Social History     Substance and Sexual Activity   Drug Use Never       Social History Never a smoker    Interval Breastfeeding History:  Frequency of breast feeding: Attempting once a day  Does mother feel breastfeeding is effective: No  Does infant appear satisfied after nursing:No  Stooling pattern normal: Yes  Urinating frequently:Yes  Using shield or shells: No    Alternative/Artificial Feedings:   Bottle: Yes, Lars Merritt is exclusively bottle fed at this time. Using a Spectra bottle and a Dr Izquierdo [place] bottle. Not using paced bottle feeding technique but feedings take a long time 30-60 minutes. Needs to be woken up for most feedings. Cup: No  Syringe/Finger: No           Formula Type: Similac 360 Total Care                     Amount: 1-1.5 ounces mixed with about an ounce of breast milk. Breast Milk:                      Amount: 1-1.5 ounces mixed with formula            Frequency Q 3 Hr between feedings  Elimination Problems: No      Equipment:  Nipple Shield             Type: none             Size: n/a             Frequency of Use: n/a  Pump            Type: Spectra S1            Frequency of Use: Every feeding. Expresses about an ounce each time she pumps. Shells            Type: none            Frequency of use: n/a    Equipment Problems: yes Jose Elias Harden is unsure her flanges fit well. Mom:  Breast: Small symmetrical breasts. Rounded shape. Slightly wide spacing. Firm and full. Nipple Assessment in General: Small everted nipples bilaterally. Mother's Awareness of Feeding Cues                 Recognizes: Yes                  Verbalizes: Yes  Support System: FOB, extended family  History of Breastfeeding: none  Changes/Stressors/Violence: Jose Elias Harden is concerned that Olive Panning does not latch or nurse effectively. She is concerned about her milk production  Concerns/Goals: Jose Elias Harden desires to exclusively breast milk feed. She would like to primarily directly breast feed. Problems with Mom: Insufficient lactation. Physical Exam  Constitutional:       Appearance: Normal appearance. HENT:      Head: Normocephalic and atraumatic. Nose: Nose normal.   Cardiovascular:      Rate and Rhythm: Normal rate and regular rhythm. Pulses: Normal pulses. Heart sounds: Murmur heard. Pulmonary:      Effort: Pulmonary effort is normal.      Breath sounds: Normal breath sounds. Musculoskeletal:         General: Normal range of motion. Cervical back: Normal range of motion and neck supple. Neurological:      Mental Status: She is alert and oriented to person, place, and time. Skin:     General: Skin is warm and dry. Psychiatric:         Mood and Affect: Mood normal.         Behavior: Behavior normal.         Thought Content:  Thought content normal.         Judgment: Judgment normal.         Infant:  Behaviors: Alert  Color: Jaundice  Birth weight: 2910 grams  Current weight: 2690 grams    Problems with infant: LPTI not latching or nursing effectively, difficulty bottle feeding. General Appearance:  Alert, active, no distress                             Head:  Normocephalic, AFOF, sutures opposed                             Eyes:  Conjunctiva clear, no drainage                              Ears:  Normally placed, no anomolies                             Nose:  No drainage or erythema                           Mouth:  No lesions. Recessed chin. Narrow gape. High palate. Tongue did not extend beyond the lower alveolar ridge, tipped on it's side when lateralizing. Tip elevates. Good cupping as he sucked but no effective peristalsis felt. The tongue retracted with every suck, exposing the lower alveolar ridge. The lingual frenulum is thin, attached posterior to the tip of the tognue. Visualization is very difficult as when the tongue is passively lifted, the mouth closes. Neck:  Supple, symmetrical, trachea midline                 Respiratory:  No grunting, flaring, retractions, breath sounds clear and equal            Cardiovascular:  Regular rate and rhythm. No murmur. Adequate perfusion/capillary refill.  Femoral pulse present                    Abdomen:   Soft, non-tender, no masses, bowel sounds present, no HSM             Genitourinary:  Normal male, testes descended, no discharge, swelling, or pain, anus patent                          Spine:   No abnormalities noted        Musculoskeletal:  Full range of motion          Skin/Hair/Nails:   Skin warm, dry, and intact, no rashes , jaundice to lower extremities                Neurologic:   No abnormal movement, tone appropriate for gestational age     Latch: on the bottle  Efficiency:               Lips Flanged: both lips needed to be manually flanged              Depth of latch: shallow initially but able to adjust into a deep latch. Wide Open: after adjustment              Suck Swallow Cycle: Breathing: unlabored, Coordinated: yes  Nipple Assessment after latch: n/a bottle nipple  Latch Problems: Rylie Croft was unable to latch at the breast after several attempts on both breasts. He was then bottle fed. He struggled to transfer milk effectively even with the bottle    Position:  Infant's Ergonomics/Body               Body Alignment: Yes, after education               Head Supported: Yes               Close to Mom's body/ Lifted/ Supported: Yes, after education               Mom's Ergonomics/Body: Yes                           Supported: Yes                           Sitting Back: Yes                           Brings Baby to her breast: Yes  Positioning Problems: Initially, Steve positioned Rylie Croft with his body rotated away from the breast.      Handouts:   Paced bottle feeding, Hands on pumping, Hand expression, Increasing your supply, and Latch Check List    Education:  Reviewed Latch: Demonstrated how to align the baby so that his nose is just above the nipple with his lower lip and chin touching the breast to encourage the deepest, widest, off-center latch. Discussed why Rylie Croft is having trouble latching and feeding effectively at this time. Reviewed Positioning for Dyad: Demonstrated how to position baby belly to belly with mom  Reviewed Frequency/Supply & Demand: Discussed how milk production is established and maintained and how to establish milk production until effective breastfeeding is established  Reviewed Infant:Cues and varied States of Awareness  Reviewed Alternative/Artificial Feedings: Discussed and demonstrated paced bottle feeding. Reviewed Equipment: Discussed and demonstrated the use and features of the spectra S1 and how to determine what size flange to use. Plan:   Reassurance and support given.   I acknowledged Steve's concerns and her commitment to breastfeeding. I encouraged her to continue pumping per her current schedule until effective breastfeeding is established. She was taught effective use of her pump. I suggested a smaller flange to improve fit. I encouraged her to spend as much time as possible skin to skin with Melanie Solis and to attempt direct breastfeeding as often as she feels comfortable. A follow up appointment was scheduled to check progress. I encouraged her to call with any questions or concerns. I have spent 90 minutes with Patient and family today in which greater than 50% of this time was spent in counseling/coordination of care regarding Instructions for management, Patient and family education, and Counseling / Coordination of care.

## 2023-11-17 NOTE — PATIENT INSTRUCTIONS
Continue pumping every 3 hours during the day and every 4-5 hours at night, or as much as you can. When pumping, cycle your pump through stimulation and expression mode several times in a session to stimulate several let downs until you have expressed enough milk to feed the baby and to achieve breast comfort. There is no need to "empty" the breast completely. Use gentle hands on pumping and hand expression   Maintain your pump as recommended. Use flange that fits comfortably and allows the breast to empty effectively. I suggest a 19mm flange. Spend as much time as you can skin to skin with [place] Terrie. Attempt direct breastfeeding as often as you feel comfortable. Follow up as scheduled. Please call with any questions or concerns.

## 2023-11-19 NOTE — PROGRESS NOTES
I have reviewed the notes, assessments, and/or procedures performed by Rd Ronquillo RN, IBCLC, I concur with her/his documentation of Yesenia Hall MD 11/18/23

## 2023-11-20 PROCEDURE — 88307 TISSUE EXAM BY PATHOLOGIST: CPT | Performed by: PATHOLOGY

## 2023-12-01 ENCOUNTER — OFFICE VISIT (OUTPATIENT)
Dept: POSTPARTUM | Facility: CLINIC | Age: 23
End: 2023-12-01
Payer: COMMERCIAL

## 2023-12-01 ENCOUNTER — TELEPHONE (OUTPATIENT)
Dept: OBGYN CLINIC | Facility: CLINIC | Age: 23
End: 2023-12-01

## 2023-12-01 ENCOUNTER — CLINICAL SUPPORT (OUTPATIENT)
Dept: OBGYN CLINIC | Facility: CLINIC | Age: 23
End: 2023-12-01

## 2023-12-01 VITALS — WEIGHT: 162 LBS | BODY MASS INDEX: 28.7 KG/M2 | SYSTOLIC BLOOD PRESSURE: 118 MMHG | DIASTOLIC BLOOD PRESSURE: 80 MMHG

## 2023-12-01 VITALS — SYSTOLIC BLOOD PRESSURE: 116 MMHG | DIASTOLIC BLOOD PRESSURE: 70 MMHG

## 2023-12-01 DIAGNOSIS — O92.79 INSUFFICIENT LACTATION: ICD-10-CM

## 2023-12-01 DIAGNOSIS — O14.93 PRE-ECLAMPSIA IN THIRD TRIMESTER: Primary | ICD-10-CM

## 2023-12-01 PROCEDURE — 99404 PREV MED CNSL INDIV APPRX 60: CPT | Performed by: PEDIATRICS

## 2023-12-01 RX ORDER — NIFEDIPINE 30 MG/1
30 TABLET, EXTENDED RELEASE ORAL DAILY
Qty: 30 TABLET | Refills: 0 | Status: SHIPPED | OUTPATIENT
Start: 2023-12-01 | End: 2023-12-31

## 2023-12-01 NOTE — PROGRESS NOTES
Pt present for a post partum BP check. Pt delivered 11/10/23 @ 37w2d GA, IOL for preeclampsia without severe features. Pt currently taking Procardia  XL 30 mg daily   BP in office today 118/80. BP:  11/23//85  126/91  11/24//84  134/88  11/25/23- 113/82  110/80  11/26//86   116/85  11/27//83  11/30//83    111/84  Pt denies any headaches, visual disturbances, swelling to LE and  tenderness to RUQ has resolved. Pt has a PP visit scheduled for 12/21/23. Discussed above with Dr. Satnam Wen, pt to continue Procardia XL 30 mg daily until seen for PP appointment. Pt will need a refill of Procardia XL 30 mg  to cover until PP visit.   Preferred Pharm: [facility]

## 2023-12-01 NOTE — TELEPHONE ENCOUNTER
Pt present for a post partum BP check. Pt delivered 11/10/23 @ 37w2d GA, IOL for preeclampsia without severe features. Pt currently taking Procardia  XL 30 mg daily   BP in office today 118/80. BP:  11/23//85  126/91  11/24//84  134/88  11/25/23- 113/82  110/80  11/26//86   116/85  11/27//83  11/30//83    111/84  Pt denies any headaches, visual disturbances, swelling to LE and  tenderness to RUQ has resolved. Pt has a PP visit scheduled for 12/21/23. Discussed above with Dr. Joceline Lagunas, pt to continue Procardia XL 30 mg daily until seen for PP appointment. Pt will need a refill of Procardia XL 30 mg  to cover until PP visit.   Preferred Pharm: [facility]

## 2023-12-01 NOTE — PATIENT INSTRUCTIONS
Continue with your current feeding/pumping plan. Refer to your hand out about more information about increasing milk production. Our breastfeeding medicine specialists are available for more evaluation and support for milk production if you desire. Please call with any questions or concerns.

## 2023-12-01 NOTE — PROGRESS NOTES
BREAST FEEDING FOLLOW UP VISIT    Informant/Relationship: Jose Elias Harden and her mom, Emely    Discussion of General Lactation Issues: Jose Elias Harden is using a nipple shield at the recommendation of Jose's Peds. He has been able to latch twice. Jose Elias Harden feels that he was able to latch effectively and drank at the breast but he was still hungry after nursing. The feeding was painful for Jose Elias Harden. Infant is 4 weeks old today. Interval Breastfeeding History:  Frequency of breast feeding: Attempting once or twice a day  Does mother feel breastfeeding is effective: No  Does infant appear satisfied after nursing:No  Stooling pattern normal: Yes  Urinating frequently:Yes  Using shield or shells: Yes, at the recommendation of Jose's Peds     Alternative/Artificial Feedings:   Bottle: Yes, Nisha Mclaughlin is exclusively bottle fed at this time. Using a Spectra bottle and a Dr Oli Levy bottle. Using paced feeding technoque. Jose Elias Harden has noticed that he flattens the bottle nipple when feeding. Cup: No  Syringe/Finger: No           Formula Type: Similac 360 Total Care                     Amount: 3 ounces when expressed milk is not available (about half of his feedings)            Breast Milk:                      Amount: 3 ounces when available            Frequency Q 2-5 Hr between feedings  Elimination Problems: No        Equipment:  Nipple Shield             Type: Lansinoh             Size: 20mm             Frequency of Use: every feeding attempt  Pump            Type: Spectra S1            Frequency of Use: Every 2-3 hours during the  day and every 4-5 hours at night. Expresses about 1-1.5 ounceseach time she pumps. Shells            Type: none            Frequency of use: n/a     Equipment Problems: No     Mom:  Breast: Small symmetrical breasts. Rounded shape. Slightly wide spacing. Firm and full. Nipple Assessment in General: Small everted nipples bilaterally.   Mother's Awareness of Feeding Cues                 Recognizes: Yes                  Verbalizes: Yes  Support System: FOB, extended family  History of Breastfeeding: none  Changes/Stressors/Violence: Faustino Cruz is concerned that Irene Esteban does not latch or nurse effectively. She is concerned about her milk production although not as much as she was at our prior visit. Concerns/Goals: Faustino Cruz desires to increase her milk production. She would like to primarily directly breast feed. Problems with Mom: Insufficient lactation. Physical Exam  Constitutional:       Appearance: Normal appearance. HENT:      Head: Normocephalic and atraumatic. Pulmonary:      Effort: Pulmonary effort is normal.   Musculoskeletal:         General: Normal range of motion. Cervical back: Normal range of motion and neck supple. Neurological:      Mental Status: She is alert and oriented to person, place, and time. Skin:     General: Skin is warm and dry. Psychiatric:         Mood and Affect: Mood normal.         Behavior: Behavior normal.         Thought Content: Thought content normal.         Judgment: Judgment normal.         Infant:  Behaviors: Alert  Color: Pink  Birth weight: 2910 grams  Current weight: 3245 grams    Problems with infant: not latching or nursing effectively without a nipple shield    General Appearance:  Alert, active, no distress                             Head:  Normocephalic, AFOF, sutures opposed                             Eyes:  Conjunctiva clear, no drainage                              Ears:  Normally placed, no anomolies                             Nose:  No drainage or erythema                           Mouth:  No lesions. Recessed chin. Narrow gape. High palate. Tongue did not extend beyond the lower alveolar ridge, tipped on it's side when lateralizing. Tip elevates. Moderate cupping as he sucked but no effective peristalsis felt. The tongue retracted with every suck, exposing the lower alveolar ridge.   The lingual frenulum is thin, attached posterior to the tip of the tognue. Visualization is very difficult as when the tongue is passively lifted, the mouth closes. Neck:  Supple, symmetrical, trachea midline                 Respiratory:  No grunting, flaring, retractions, breath sounds clear and equal            Cardiovascular:  Regular rate and rhythm. No murmur. Adequate perfusion/capillary refill. Femoral pulse present                    Abdomen:   Soft, non-tender, no masses, bowel sounds present, no HSM             Genitourinary:  Normal male, testes descended, no discharge, swelling, or pain, anus patent                          Spine:   No abnormalities noted        Musculoskeletal:  Full range of motion          Skin/Hair/Nails:   Skin warm, dry, and intact, no rashes , jaundice to lower extremities                Neurologic:   No abnormal movement, tone appropriate for gestational age    [place] Latch:with the nipple shield  Efficiency:               Lips Flanged: lower lip was pulled deeply into his mouth              Depth of latch: shallow, just on the nipple              Audible Swallow: just a couple during RUSSELL. Mostly NNS noted              Visible Milk: Yes              Wide Open/ Asymmetrical: No              Suck Swallow Cycle: Breathing: unlabored, Coordinated: yes  Nipple Assessment after latch: Normal: elongated/eraser, no discoloration and no damage noted. Latch Problems: Vidya Gardner was not able to latch. He attempted a few times but when he became frustrated, the attempt was stopped. The nipple shield was then placed. Vidya Gardner latched only on the nipple of the shield. The nipple telescoped in and out of his mouth. Very little swallowing was noted. He was quickly asleep. [facility] experienced some pain as he slipped back into an even more shallow latch as he fed. He was then bottle fed. He struggled to latch deeply on the bottle teat.   His lower lip was pulled deeply into his mouth as he sucked. Position:  Infant's Ergonomics/Body               Body Alignment: Yes               Head Supported: Yes               Close to Mom's body/ Lifted/ Supported: Yes               Mom's Ergonomics/Body: Yes                           Supported: Yes                           Sitting Back: Yes                           Brings Baby to her breast: Yes  Positioning Problems: None. Steve positioned Demar lester effectively in cross cradle hold with support from a breastfeeding pillow. Handouts:   Nipple shields. Education:  Reviewed Latch: discussed why Demar lester is having trouble latching and suckling effectively at the breast.  Reviewed Frequency/Supply & Demand: discussed galactagogues to help with milk production        Plan:    Reassurance and support given. I acknowledged Steve's concerns and her commitment to providing milk for Demar lester. I encouraged her to continue to attempt direct breastfeeding as often as she feels comfortable. I encouraged her to attempt without a nipple shield. I reviewed the risks of prolonged, frequent nipple shield. I recommended she continue pumping as often as she feels she can. I suggested a slightly smaller flange. She was given information about galactagogues which may help with milk production over time. I offered her additional evaluation and support from one of our breastfeeding medicine specialists which she declined at this time. I encouraged her to call with any questions or concerns. I have spent 60 minutes with Patient and family today in which greater than 50% of this time was spent in counseling/coordination of care regarding Instructions for management, Patient and family education, and Counseling / Coordination of care.

## 2023-12-02 NOTE — TELEPHONE ENCOUNTER
Nurse:  Please advise patient to continue taking blood pressures daily and if BP persist in the range of 110/70, patient to discontinue Procardia. Refill for Procardia XL 30 mg, disp #30 sent to pt's pharmacy.

## 2023-12-03 NOTE — PROGRESS NOTES
I have reviewed the notes, assessments, and/or procedures performed by Irene Mas RN, IBCLC, I concur with her/his documentation of Viviane Goldberg MD 12/03/23

## 2023-12-04 NOTE — TELEPHONE ENCOUNTER
Spoke with patient and informed her to continue taking blood pressures daily and if persists in rang of 110/70, to discontinue Procardia. Informed patient that procardia refill has been sent to her pharmacy. Patient verbalized understanding and appreciated phone call.

## 2023-12-21 ENCOUNTER — POSTPARTUM VISIT (OUTPATIENT)
Dept: OBGYN CLINIC | Facility: CLINIC | Age: 23
End: 2023-12-21

## 2023-12-21 VITALS
WEIGHT: 164 LBS | HEIGHT: 63 IN | BODY MASS INDEX: 29.06 KG/M2 | SYSTOLIC BLOOD PRESSURE: 112 MMHG | DIASTOLIC BLOOD PRESSURE: 66 MMHG

## 2023-12-21 DIAGNOSIS — N92.6 IRREGULAR MENSES: ICD-10-CM

## 2023-12-21 PROCEDURE — 99024 POSTOP FOLLOW-UP VISIT: CPT | Performed by: OBSTETRICS & GYNECOLOGY

## 2023-12-21 NOTE — PROGRESS NOTES
Routine Post Partum Visit  [facility] - [address], [address], [place] [address]    Assessment/Plan:  Steve is a 23 y.o. year old  who presents for postpartum visit.    Routine Postpartum Care  Normal postpartum exam  Contraception: condoms  Depression Screen: PPD screen score: 4; negative  Feeding:  She is breast feeding exclusively.  Psychosocial support: present  Cervical cancer screening Up to Date  Follow up in: 3 months for wellness visit, or as needed.    Additional Problems:  1. Postpartum state      -  Patient discontinued Procardia few weeks ago with blood pressures being 110/60 range.  Completed oral iron supplements last week.  Taking PNV.        2. Irregular menses  -  Patient states lochia gradualluy decreased post  and then stopped.  Patient with period type bleeding that stated 5 days ago.  Denies having had heavy vaginal bleeding since discharge from L&D.  Patient feels she is having her period with light cramping.  Has not taken Advil/Motrin.  -  Advised patient to monitor bleeding and if becomes heavy, inform Ob/Gyn  -  Advised to schedule pelvic U/S even if bleeding is not heavy but continues longer than 7 days  -     US pelvis complete w transvaginal; Future; Expected date: 2023        Next visit: 3 months Wellness    Subjective:     CC: Postpartum visit    Steve Mahoney is a 23 y.o. y.o. female  who presents for a postpartum visit.     She is 6 weeks postpartum following a spontaneous vaginal delivery on 11/10/23 at 37.2 weeks.    Outcome: spontaneous vaginal delivery, 2nd degree  Anesthesia: epidural. Postpartum course has been complicated by anemia and transfusion of 2 units PRBC  . Baby's course has been uncomplicated. Baby is feeding by She is breast feeding exclusively..     Bleeding red that started 5 days ago Bowel function is normal. Bladder function is normal. Patient is not sexually active since delivery. Contraception method is  "condoms. Postpartum depression screening: negative.    The following portions of the patient's history were reviewed and updated as appropriate: allergies, current medications, past family history, past medical history, obstetric history, gynecologic history, past social history, past surgical history and problem list.      Objective:  /66 (BP Location: Left arm, Patient Position: Sitting, Cuff Size: Standard)   Ht 5' 3\" (1.6 m)   Wt 74.4 kg (164 lb)   LMP 02/22/2023 (Exact Date)   Breastfeeding Yes   BMI 29.05 kg/m²   Pregravid Weight/BMI: 67.1 kg (148 lb) (BMI 26.22)  Current Weight: 74.4 kg (164 lb)   Total Weight Gain: 20.4 kg (45 lb)     General: Well appearing, no distress.  Mood and affect: Appropriate.  Abdomen: Soft, nontender  Vulva: normal  Vagina: normal, no abnormal discharge  Cervix: closed, no bleeding  Uterus: non-tender, normal size  Adnexa: no masses, no tenderness  "

## 2023-12-28 ENCOUNTER — HOSPITAL ENCOUNTER (OUTPATIENT)
Dept: ULTRASOUND IMAGING | Facility: CLINIC | Age: 23
Discharge: HOME | End: 2023-12-28
Payer: COMMERCIAL

## 2023-12-28 DIAGNOSIS — N92.6 IRREGULAR MENSES: ICD-10-CM

## 2023-12-28 PROCEDURE — 76856 US EXAM PELVIC COMPLETE: CPT

## 2023-12-28 PROCEDURE — 76830 TRANSVAGINAL US NON-OB: CPT

## 2024-07-15 ENCOUNTER — ANNUAL EXAM (OUTPATIENT)
Dept: OBGYN CLINIC | Facility: CLINIC | Age: 24
End: 2024-07-15
Payer: COMMERCIAL

## 2024-07-15 VITALS
HEIGHT: 63 IN | BODY MASS INDEX: 29.95 KG/M2 | WEIGHT: 169 LBS | SYSTOLIC BLOOD PRESSURE: 122 MMHG | DIASTOLIC BLOOD PRESSURE: 82 MMHG

## 2024-07-15 DIAGNOSIS — Z01.419 ENCOUNTER FOR GYNECOLOGICAL EXAMINATION WITHOUT ABNORMAL FINDING: Primary | ICD-10-CM

## 2024-07-15 DIAGNOSIS — R87.612 LGSIL OF CERVIX OF UNDETERMINED SIGNIFICANCE: ICD-10-CM

## 2024-07-15 DIAGNOSIS — Z30.09 CONTRACEPTIVE EDUCATION: ICD-10-CM

## 2024-07-15 DIAGNOSIS — Z12.4 SCREENING FOR CERVICAL CANCER: ICD-10-CM

## 2024-07-15 PROBLEM — Z36.89 ENCOUNTER FOR OTHER SPECIFIED ANTENATAL SCREENING: Status: RESOLVED | Noted: 2023-08-18 | Resolved: 2024-07-15

## 2024-07-15 PROBLEM — O14.90 PRE-ECLAMPSIA: Status: RESOLVED | Noted: 2023-11-05 | Resolved: 2024-07-15

## 2024-07-15 PROCEDURE — S0612 ANNUAL GYNECOLOGICAL EXAMINA: HCPCS | Performed by: OBSTETRICS & GYNECOLOGY

## 2024-07-15 NOTE — PROGRESS NOTES
[facility] [address], [address], [place] [address]    ASSESSMENT/PLAN: Steve Mahoney is a 24 y.o.  who presents for annual gynecologic exam.    Encounter for routine gynecologic examination  - Routine well woman exam completed today.  - HPV Vaccination status: Immunization series complete  - STI screening offered including HIV testing: Declined  - Contraceptive counseling discussed.  Current contraception: condoms:     Additional problems addressed during this visit:  1. Encounter for gynecological examination without abnormal finding  2. Screening for cervical cancer  -     IGP, rfx Aptima HPV ASCU  3. LGSIL of cervix of undetermined significance  -     IGP, rfx Aptima HPV ASCU  4. Contraceptive education  Pt not  taking  mvi would  continue .  Wants to conceive in the next  year or so.  Would wait  at least  17 mo between.  Lactation  nurse heard a murmur PP.  Pt to fu with  primary.   Encouraged wt loss and exercise.  Has had 3 menses since delivery  No family planning    CC:  Annual Gynecologic Examination    HPI: Steve Mahoney is a 24 y.o.  who presents for annual gynecologic examination.  HPI    The following portions of the patient's history were reviewed and updated as appropriate: She  has a past medical history of Abnormal Pap smear of cervix, Allergic, Impacted teeth, Maternal anemia with delivery, with current postpartum complication (2023), and Ventricular septal defect.  She  has a past surgical history that includes [place] tooth extraction.  Her family history includes Diabetes in her maternal grandmother; Endometriosis in her mother; Heart disease in her paternal grandfather; Hypertension in her maternal grandmother and mother; Kidney failure in her maternal grandmother; Leukemia in her maternal grandfather; No Known Problems in her father.  She  reports that she has never smoked. She has never been exposed to tobacco smoke. She has never  "used smokeless tobacco. She reports that she does not currently use alcohol. She reports that she does not currently use drugs.  Current Outpatient Medications   Medication Sig Dispense Refill   • docusate sodium (COLACE) 100 mg capsule Take 1 capsule (100 mg total) by mouth 2 (two) times a day as needed for constipation (Patient not taking: Reported on 7/15/2024) 60 capsule 0   • Prenatal Vit-Fe Fumarate-FA (PRENATAL VITAMINS PO) Take by mouth       No current facility-administered medications for this visit.     She is allergic to azithromycin..    Review of Systems      Objective:  /82 (BP Location: Left arm, Patient Position: Sitting, Cuff Size: Standard)   Ht 5' 3\" (1.6 m)   Wt 76.7 kg (169 lb)   LMP 06/28/2024   Breastfeeding Yes   BMI 29.94 kg/m²    Physical Exam     "

## 2024-07-17 LAB
CYTOLOGIST CVX/VAG CYTO: NORMAL
DX ICD CODE: NORMAL
OTHER STN SPEC: NORMAL
OTHER STN SPEC: NORMAL
PATH REPORT.FINAL DX SPEC: NORMAL
SL AMB NOTE:: NORMAL
SL AMB SPECIMEN ADEQUACY: NORMAL
SL AMB TEST METHODOLOGY: NORMAL

## 2024-08-05 ENCOUNTER — TELEPHONE (OUTPATIENT)
Age: 24
End: 2024-08-05

## 2024-08-14 PROBLEM — Z01.419 ENCOUNTER FOR GYNECOLOGICAL EXAMINATION WITHOUT ABNORMAL FINDING: Status: RESOLVED | Noted: 2024-07-15 | Resolved: 2024-08-14

## 2024-08-14 PROBLEM — Z12.4 SCREENING FOR CERVICAL CANCER: Status: RESOLVED | Noted: 2024-07-15 | Resolved: 2024-08-14

## 2024-08-26 ENCOUNTER — ULTRASOUND (OUTPATIENT)
Dept: OBGYN CLINIC | Facility: CLINIC | Age: 24
End: 2024-08-26
Payer: COMMERCIAL

## 2024-08-26 VITALS
BODY MASS INDEX: 30.12 KG/M2 | WEIGHT: 170 LBS | HEIGHT: 63 IN | DIASTOLIC BLOOD PRESSURE: 84 MMHG | SYSTOLIC BLOOD PRESSURE: 118 MMHG

## 2024-08-26 DIAGNOSIS — Z3A.08 8 WEEKS GESTATION OF PREGNANCY: ICD-10-CM

## 2024-08-26 DIAGNOSIS — O36.80X0 PREGNANCY WITH UNCERTAIN FETAL VIABILITY, SINGLE OR UNSPECIFIED FETUS: Primary | ICD-10-CM

## 2024-08-26 PROCEDURE — 99213 OFFICE O/P EST LOW 20 MIN: CPT | Performed by: OBSTETRICS & GYNECOLOGY

## 2024-08-26 PROCEDURE — 76817 TRANSVAGINAL US OBSTETRIC: CPT | Performed by: OBSTETRICS & GYNECOLOGY

## 2024-08-26 NOTE — ADDENDUM NOTE
Addended by: ANALISA SNEED on: 8/26/2024 03:14 PM     Modules accepted: Orders, Level of Service

## 2024-08-26 NOTE — PROGRESS NOTES
"Pregnancy Confirmation Visit  [facility] - [address], [address], [place] [address]    Assessment/Plan:  24 y.o.  presenting with missed menses.  Viable pregnancy 8w3d by LMP consistent with ultrasound today.  - Continue/start prenatal vitamin  - We reviewed her current medications and discussed which are safe to continue in pregnancy  - We briefly discussed options for aneuploidy screening, to be discussed further at the prenatal intake  - Schedule prenatal intake with RN and initial prenatal visit; prenatal labs will be ordered during the prenatal intake      Subjective:    CC: Missed period    Steve Mahoney is a 24 y.o.  who presents with missed menses.  Patient's last menstrual period was 2024 (exact date).    Patient notes that this pregnancy was planned and desired.  She was not using contraception at the time of conception. She reports she is certain of her LMP and that she has regular menses. She has has no vaginal bleeding since her LMP.    Objective:  /84 (BP Location: Right arm, Patient Position: Sitting, Cuff Size: Standard)   Ht 5' 3\" (1.6 m)   Wt 77.1 kg (170 lb)   LMP 2024 (Exact Date)   Breastfeeding No   BMI 30.11 kg/m²     Physical Exam:  General: Well appearing, no distress  CV: Regular rate  Respiratory: Unlabored breathing  Abdomen: Soft, nontender  Extremities: Without edema  Mood and Affect: Appropriate    Transvaginal Pelvic Ultrasound  [place] IUP  Yolk sac: Present  Fetal Pole: Present  CRL consistent with EGA   Cardiac activity: Present   bpm  No adnexal masses appreciated    "

## 2024-08-27 ENCOUNTER — TELEPHONE (OUTPATIENT)
Age: 24
End: 2024-08-27

## 2024-09-09 ENCOUNTER — INITIAL PRENATAL (OUTPATIENT)
Dept: OBGYN CLINIC | Facility: CLINIC | Age: 24
End: 2024-09-09

## 2024-09-09 ENCOUNTER — PATIENT OUTREACH (OUTPATIENT)
Dept: OBGYN CLINIC | Facility: CLINIC | Age: 24
End: 2024-09-09

## 2024-09-09 VITALS
SYSTOLIC BLOOD PRESSURE: 112 MMHG | HEIGHT: 63 IN | BODY MASS INDEX: 30.41 KG/M2 | WEIGHT: 171.6 LBS | DIASTOLIC BLOOD PRESSURE: 70 MMHG

## 2024-09-09 DIAGNOSIS — Z34.81 PRENATAL CARE, SUBSEQUENT PREGNANCY, FIRST TRIMESTER: ICD-10-CM

## 2024-09-09 DIAGNOSIS — O09.299 HX OF PREECLAMPSIA, PRIOR PREGNANCY, CURRENTLY PREGNANT: ICD-10-CM

## 2024-09-09 DIAGNOSIS — Z3A.10 10 WEEKS GESTATION OF PREGNANCY: Primary | ICD-10-CM

## 2024-09-09 DIAGNOSIS — Z31.430 ENCOUNTER OF FEMALE FOR TESTING FOR GENETIC DISEASE CARRIER STATUS FOR PROCREATIVE MANAGEMENT: ICD-10-CM

## 2024-09-09 PROCEDURE — OBC: Performed by: PHYSICIAN ASSISTANT

## 2024-09-09 NOTE — PROGRESS NOTES
SW referred for initial prenatal assessment. Patient is , [id number] with an ROB of 25. Patient agreeable to meeting with SW today.    Patient reports this is a planned pregnancy. She and FOB ( Calvin) both work and drive. Patient denies needing information for MA/WIC/SNAP, parenting education, or baby supply resources today.    Patient denies current or h/o mental health, substance use, DV/IPV, CYS involvement, and legal concerns. She indicates good support from FOB, family, and friends. She expressed interest in advanced directive/living will resources - 5 Wishes sent via email. She denies any additional SW needs today, SW closing referral. Please re-refer as needed.

## 2024-09-09 NOTE — PATIENT INSTRUCTIONS
Congratulation!! Please review our Pregnancy Essential Guide and [facility] L&D Virtual tour from our networks website.     [facility] Pregnancy Essentials Guide  [facility] ([email or web address])     [facility] - Virtual Tour ([employer])  Rainehemal Steve,     It was so nice getting to know you today. Remember if you have an urgent or time sensitive concern, please call the practice phone number so a clinical triage team member can review your symptoms and get in touch with our on call provider if necessary. If you have general questions or need help navigating our services please REPLY to this message so it comes directly to me and I will respond between other patients. If I am out of the office for any reason, another nurse navigator may reach out to help you. Our Pregnancy Essential Guide is a great online resource--please use the link below.     [facility] Pregnancy Essentials Guide  [facility] ([email or web address])     Again, Congratulations and Thank You for choosing [facility]. I look forward to helping you through this journey. Reach out-   Joslyn JEFFREY RN

## 2024-09-09 NOTE — PROGRESS NOTES
OB INTAKE INTERVIEW  Patient is 24 y.o. who presents for OB intake at 10 wks 3 days  She is accompanied by her , Calvin during this encounter  The father of her baby (Calvin Mahoney) is involved in the pregnancy and is 28 years old.      Last Menstrual Period: 2024  Ultrasound: Measured 7 weeks 6 days on 2024  Estimated Date of Delivery: 2025 confirmed by US    Signs/Symptoms of Pregnancy  Current pregnancy symptoms: nausea, vomiting 2x/wk triggered by smell aversions, urinary frequency, fatigue  Constipation no  Headaches YES (infrequent - not migraine type) - usually resolves with increased hydration, rest  Cramping/spotting YES - sl cramping, no bleeding  PICA cravings no    Diabetes-  There is no height or weight on file to calculate BMI.  If patient has 1 or more, please order early 1 hour GTT  History of GDM no (had elevated 1 hr glucose = 140, 3 hr wnl  BMI >35 no  History of PCOS or current metformin use no  History of LGA/macrosomic infant (4000g/9lbs) no    If patient has 2 or more, please order early 1 hour GTT  BMI>30 YES  AMA no  First degree relative with type 2 diabetes no  History of chronic HTN, hyperlipidemia, elevated A1C no  High risk race (, , ,  or ) no    Hypertension- if you answer yes to any of the following, please order baseline preeclampsia labs (cbc, comprehensive metabolic panel, urine protein creatinine ratio, uric acid)  History of of chronic HTN YES  History of gestational HTN YES  History of preeclampsia, eclampsia, or HELLP syndrome YES  History of diabetes no  History of lupus, autoimmune disease, kidney disease no    Thyroid- if yes order TSH with reflex T4  History of thyroid disease no    Bleeding Disorder or Hx of DVT-patient or first degree relative with history of. Order the following if not done previously.   (Factor V, antithrombin III, prothrombin gene mutation, protein C and S Ag, lupus  anticoagulant, anticardiolipin, beta-2 glycoprotein)   no    OB/GYN-  History of abnormal pap smear YES (LGSIL 3/30/2023 - repeat pap 7/15/2024 - wnl)     Date of last pap smear 7/15/2024 (wnl)  History of HPV no  History of Herpes/HSV no  History of other STI (gonorrhea, chlamydia, trich) no  History of prior  yes  History of prior  no  History of  delivery prior to 36 weeks 6 days no  History of Varicella or Vaccination patient had vaccine  History of blood transfusion YES  Ok for blood transfusion YES    Substance screening-   History of tobacco use no  Currently using tobacco no  Substance Use Screen Level (N/A, LOW, HIGH) N/A    MRSA Screening-   Does the pt have a hx of MRSA? no    Immunizations:  Influenza vaccine given this season NO - patient usually does not get flu vaccine - recommended in pregnancy  Discussed Tdap vaccine YES  Discussed COVID Vaccine no previous Covid vaccine, patient had Covid x 1    Genetic/MFM-  Do you or your partner have a history of any of the following in yourselves or first degree relatives?  Cystic fibrosis no  Spinal muscular atrophy no  Hemoglobinopathy/Sickle Cell/Thalassemia no  Fragile X Intellectual Disability no    If yes, discuss Carrier Screening and recommend consultation with MF/Genetic Counseling and place specific [facility] Referral for.    If no, discuss Carrier Screening being completed once in a lifetime as a standard of care lab test. Place orders for Cystic Fibrosis Gene Test (AFV339) and Spinal Muscular Atrophy DNA ([id number]) - discussed & ordered      Appointment for Nuchal Translucency Ultrasound at [facility] scheduled for 2024 -discussed NIPT/MSAFP      Interview education  [facility] Pregnancy Essentials Book reviewed, discussed and attached to their AVS YES    Nurse/Family Partnership- patient may qualify; referral placed NO    Prenatal lab work scripts YES ([facility])  Extra labs ordered:  CF/SMA carrier screening, CMP, uric acid, urine  protein/creatinine ratio    Aspirin/Preeclampsia Screen    Risk Level Risk Factor Recommendation   LOW Prior Uncomplicated full-term delivery no No Aspirin recommendation        MODERATE Nulliparity no Recommend low-dose aspirin if     BMI>30 YES 2 or more moderate risk factors    Family History Preeclampsia (mother/sister) no     35yr old or greater YES     Black Race, Concern for SDOH/Low Socioeconomic no     IVF Pregnancy  no     Personal History Risks (low birth weight, prior adverse preg outcome, >10yr preg interval) YES - hx pre-eclampsia, anemia, short interval preg         HIGH History of Preeclampsia YES Recommend low-dose aspirin if     Multifetal gestation no 1 or more high risk factors    Chronic HTN no     Type 1 or 2 Diabetes no     Renal Disease no     Autoimmune Disease  no      Contraindications to ASA therapy:  NSAID/ ASA allergy: no  Nasal polyps: no  Asthma with history of ASA induced bronchospasm: no  Relative contraindications:  History of GI bleed: no  Active peptic ulcer disease: no  Severe hepatic dysfunction: no    Patient should be recommended to take ASA 162mg during this pregnancy from 12-36wks to lower her risk of preeclampsia: HIGH RISK per screening protocol - recommend LDASA 162 mg/day wk 12-36      The patient has a history now or in prior pregnancy notable for:  - short interval pregnancy  - hx pre-eclampsia - 36 wk - 1st pregnancy - SAVD @ 37 wk 2 days -11/10/2023 - Procardia x 1 month postpartum  - hx severe anemia - 5.9/18.2 - iron infusion 2023  - hx abn pap - LGSIL 3/2023 - did not have colposcopy - repeat pap 7/15/2024 - wnl      Details that I feel the provider should be aware of:     - patient with hx  - had fetal echo last pregnancy  - had declined NIPT last pregnancy - had MSAFP - will verify insurance coverage for NIPT  - no dietary restrictions - reviewed dietary recommendations in pregnancy  - has 2 cats @ home -  changes cat litter  - will establish  with dentist for q 6 month dental cleanings - last cleaning approx 5 months ago  - 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 The patient was oriented to our practice, the navigator role, reviewed delivering physicians and [facility] for Delivery. All questions were answered.    Interviewed by: FAITH Ruiz RN

## 2024-09-13 ENCOUNTER — TELEPHONE (OUTPATIENT)
Age: 24
End: 2024-09-13

## 2024-09-19 LAB
EXTERNAL ANTIBODY SCREEN: NORMAL
EXTERNAL HEMOGLOBIN: 13.5 G/DL
EXTERNAL HEPATITIS B SURFACE ANTIGEN: NEGATIVE
EXTERNAL HIV-1 AB: NONREACTIVE
EXTERNAL HIV-1 P24 ANTIGEN: NONREACTIVE
EXTERNAL HIV-2 AB: NONREACTIVE
EXTERNAL RH FACTOR: POSITIVE
EXTERNAL RUBELLA IGG QUANTITATION: NORMAL
EXTERNAL SYPHILIS TOTAL IGG/IGM SCREENING: NONREACTIVE

## 2024-09-21 LAB
ABO GROUP BLD: NORMAL
ALBUMIN SERPL-MCNC: 4.3 G/DL (ref 4–5)
ALP SERPL-CCNC: 74 IU/L (ref 44–121)
ALT SERPL-CCNC: 9 IU/L (ref 0–32)
APPEARANCE UR: CLEAR
AST SERPL-CCNC: 10 IU/L (ref 0–40)
BACTERIA UR CULT: NO GROWTH
BACTERIA UR CULT: NORMAL
BACTERIA URNS QL MICRO: NORMAL
BASOPHILS # BLD AUTO: 0 X10E3/UL (ref 0–0.2)
BASOPHILS NFR BLD AUTO: 0 %
BILIRUB SERPL-MCNC: 0.3 MG/DL (ref 0–1.2)
BILIRUB UR QL STRIP: NEGATIVE
BLD GP AB SCN SERPL QL: NEGATIVE
BUN SERPL-MCNC: 6 MG/DL (ref 6–20)
BUN/CREAT SERPL: 12 (ref 9–23)
C TRACH RRNA SPEC QL NAA+PROBE: NEGATIVE
CALCIUM SERPL-MCNC: 9.6 MG/DL (ref 8.7–10.2)
CASTS URNS QL MICRO: NORMAL /LPF
CHLORIDE SERPL-SCNC: 102 MMOL/L (ref 96–106)
CO2 SERPL-SCNC: 20 MMOL/L (ref 20–29)
COLOR UR: YELLOW
CREAT SERPL-MCNC: 0.52 MG/DL (ref 0.57–1)
CREAT UR-MCNC: 119.5 MG/DL
EGFR: 131 ML/MIN/1.73
EOSINOPHIL # BLD AUTO: 0.1 X10E3/UL (ref 0–0.4)
EOSINOPHIL NFR BLD AUTO: 1 %
EPI CELLS #/AREA URNS HPF: NORMAL /HPF (ref 0–10)
ERYTHROCYTE [DISTWIDTH] IN BLOOD BY AUTOMATED COUNT: 12.7 % (ref 11.7–15.4)
GLOBULIN SER-MCNC: 2.6 G/DL (ref 1.5–4.5)
GLUCOSE SERPL-MCNC: 95 MG/DL (ref 70–99)
GLUCOSE UR QL: NEGATIVE
HBV SURFACE AG SERPL QL IA: NEGATIVE
HCT VFR BLD AUTO: 40 % (ref 34–46.6)
HCV AB S/CO SERPL IA: NON REACTIVE
HGB BLD-MCNC: 13.5 G/DL (ref 11.1–15.9)
HGB UR QL STRIP: NEGATIVE
HIV 1+2 AB+HIV1 P24 AG SERPL QL IA: NON REACTIVE
IMM GRANULOCYTES # BLD: 0 X10E3/UL (ref 0–0.1)
IMM GRANULOCYTES NFR BLD: 0 %
KETONES UR QL STRIP: NEGATIVE
LEUKOCYTE ESTERASE UR QL STRIP: NEGATIVE
LYMPHOCYTES # BLD AUTO: 1.9 X10E3/UL (ref 0.7–3.1)
LYMPHOCYTES NFR BLD AUTO: 21 %
MCH RBC QN AUTO: 29.1 PG (ref 26.6–33)
MCHC RBC AUTO-ENTMCNC: 33.8 G/DL (ref 31.5–35.7)
MCV RBC AUTO: 86 FL (ref 79–97)
MICRO URNS: NORMAL
MICRO URNS: NORMAL
MONOCYTES # BLD AUTO: 0.4 X10E3/UL (ref 0.1–0.9)
MONOCYTES NFR BLD AUTO: 5 %
N GONORRHOEA RRNA SPEC QL NAA+PROBE: NEGATIVE
NEUTROPHILS # BLD AUTO: 6.6 X10E3/UL (ref 1.4–7)
NEUTROPHILS NFR BLD AUTO: 73 %
NITRITE UR QL STRIP: NEGATIVE
PH UR STRIP: 6.5 [PH] (ref 5–7.5)
PLATELET # BLD AUTO: 273 X10E3/UL (ref 150–450)
POTASSIUM SERPL-SCNC: 4.2 MMOL/L (ref 3.5–5.2)
PROT SERPL-MCNC: 6.9 G/DL (ref 6–8.5)
PROT UR QL STRIP: NEGATIVE
PROT UR-MCNC: 9.2 MG/DL
PROT/CREAT UR: 77 MG/G CREAT (ref 0–200)
RBC # BLD AUTO: 4.64 X10E6/UL (ref 3.77–5.28)
RBC #/AREA URNS HPF: NORMAL /HPF (ref 0–2)
RH BLD: POSITIVE
RPR SER QL: NON REACTIVE
RUBV IGG SERPL IA-ACNC: 3.44 INDEX
SL AMB INTERPRETATION: NORMAL
SODIUM SERPL-SCNC: 137 MMOL/L (ref 134–144)
SP GR UR: 1.02 (ref 1–1.03)
URATE SERPL-MCNC: 3.8 MG/DL (ref 2.6–6.2)
UROBILINOGEN UR STRIP-ACNC: 1 MG/DL (ref 0.2–1)
WBC # BLD AUTO: 9 X10E3/UL (ref 3.4–10.8)
WBC #/AREA URNS HPF: NORMAL /HPF (ref 0–5)

## 2024-09-22 PROBLEM — O09.899 SHORT INTERVAL BETWEEN PREGNANCIES AFFECTING PREGNANCY, ANTEPARTUM: Status: ACTIVE | Noted: 2024-09-22

## 2024-09-23 ENCOUNTER — ROUTINE PRENATAL (OUTPATIENT)
Dept: PERINATAL CARE | Facility: OTHER | Age: 24
End: 2024-09-23
Payer: COMMERCIAL

## 2024-09-23 VITALS
HEIGHT: 63 IN | DIASTOLIC BLOOD PRESSURE: 68 MMHG | WEIGHT: 168 LBS | SYSTOLIC BLOOD PRESSURE: 104 MMHG | BODY MASS INDEX: 29.77 KG/M2 | HEART RATE: 97 BPM

## 2024-09-23 DIAGNOSIS — O09.899 SHORT INTERVAL BETWEEN PREGNANCIES AFFECTING PREGNANCY, ANTEPARTUM: ICD-10-CM

## 2024-09-23 DIAGNOSIS — O99.419 MATERNAL CONGENITAL HEART DISEASE, ANTEPARTUM: ICD-10-CM

## 2024-09-23 DIAGNOSIS — Z3A.12 12 WEEKS GESTATION OF PREGNANCY: Primary | ICD-10-CM

## 2024-09-23 DIAGNOSIS — O09.299 HISTORY OF POSTPARTUM HEMORRHAGE, CURRENTLY PREGNANT: ICD-10-CM

## 2024-09-23 DIAGNOSIS — Z3A.08 8 WEEKS GESTATION OF PREGNANCY: ICD-10-CM

## 2024-09-23 DIAGNOSIS — Q24.9 MATERNAL CONGENITAL HEART DISEASE, ANTEPARTUM: ICD-10-CM

## 2024-09-23 DIAGNOSIS — Z36.82 ENCOUNTER FOR ANTENATAL SCREENING FOR NUCHAL TRANSLUCENCY: ICD-10-CM

## 2024-09-23 PROCEDURE — 76801 OB US < 14 WKS SINGLE FETUS: CPT | Performed by: OBSTETRICS & GYNECOLOGY

## 2024-09-23 PROCEDURE — 76813 OB US NUCHAL MEAS 1 GEST: CPT | Performed by: OBSTETRICS & GYNECOLOGY

## 2024-09-23 PROCEDURE — 99214 OFFICE O/P EST MOD 30 MIN: CPT | Performed by: OBSTETRICS & GYNECOLOGY

## 2024-09-23 RX ORDER — ASPIRIN 81 MG/1
162 TABLET, CHEWABLE ORAL DAILY
COMMUNITY

## 2024-09-23 NOTE — LETTER
"   Date: 2024    Bouchra Vail V, DO  [address]  [address]  [place] [address]    Patient: Steve Mahoney   YOB: 2000   Date of Visit: 2024   Gestational age 12w4d   Nature of this communication: Routine       This patient was seen recently in our  office.  Please see ultrasound report under \"OB Procedures\" tab.  Please don't hesitate to contact our office with any concerns or questions.      Sincerely,      Karly Broussard MD  Attending Physician, Maternal-Fetal Medicine  [facility]      "

## 2024-09-24 PROBLEM — O09.299 HISTORY OF POSTPARTUM HEMORRHAGE, CURRENTLY PREGNANT: Status: ACTIVE | Noted: 2024-09-24

## 2024-09-24 NOTE — PROGRESS NOTES
"[facility]: Ms. Mahoney was seen today for nuchal translucency ultrasound.  See ultrasound report under \"OB Procedures\" tab.      MDM:   I. Diagnoses/Problems addressed:  Self limited or minor problem: uncomplicated pregnancy  II.  Data: I reviewed notes from prior delivery.  III.  Risk of morbidity: Moderate    Please don't hesitate to contact our office with any concerns or questions.  -Karly Broussard MD    "

## 2024-09-30 NOTE — PROGRESS NOTES
[facility] [address], [address], [place] [address]    Assessment/Plan:  Steve is a 24 y.o. year old who presents for initiation of prenatal care.     1. 13 weeks gestation of pregnancy  -     POCT urine dip  -     Chlamydia/GC REGI, Confirmation  2. History of postpartum hemorrhage, currently pregnant  Assessment & Plan:  Hx PPH + transfusion with  cc. Hb at that time was 5.9 and patient received 2U pRBC's. Initial Hb with prenatal labs 13.5. [facility] reviewed this with patient at US visit and recommended optimization of Hb, consideration for TXA, and access to uterotonic agents. Continue to monitor and optimize Hb in pregnancy  3. Short interval between pregnancies affecting pregnancy, antepartum  Assessment & Plan:  Aware of associated risks such as with an increased risk of  birth and small-for-gestational-age newborns. Per MFM patient to undergo 3rd trimester growth US assessments. Patient aware of recommendations   4. Maternal congenital heart disease, antepartum  Assessment & Plan:  Hx maternal VSD, spontaneously closed. Fetal echocardiogram recommended per [facility], ordered. Will await results.   5. Hx of preeclampsia, prior pregnancy, currently pregnant  Assessment & Plan:  Hx pre-eclampsia in prior pregnancy. Aware of risks of recurrence. Did not receive magnesium sulfate therapy. Did take Procardia postpartum, currently not on any medications. PIH labs for 1st trimester completed and unremarkable (P/C 0.07). ASA prescribed, taking 162 mg daily until 36 weeks. Normotensive at today's visit. Continue to monitor     Subjective:   CC:  Initiating prenatal care  Steve Mahoney is a 24 y.o.  female who presents for initiation of prenatal care. She has hx of 1 prior  in 2023. She has completed her 1st trimester anatomy US as well at NT measurement. She was seen by [facility] during that US visit. She declined genetic testing at that time. Her prenatal lab work has been  completed and reviewed. Pap smear last collected on 7/15/24. She is amenable to collection of GC/CT at today's visit as well as a pelvic examination.     Otherwise she reports some nausea. Has been trying sips of water which helps. Some vomiting, random, at most 3 times a week. Had similar symptoms in prior pregnancy which resolved in 2nd trimester. Also struggling with heartburn. Will trial TUMS.     ROS otherwise unremarkable.     Pregnancy ROS: denies leakage of fluid, pelvic pain, or vaginal bleeding.     Past Medical History:   Diagnosis Date    Abnormal Pap smear of cervix     LGSIL on pap 3/2023    Allergic     History of transfusion     iron infusion post delivery    Hypertension     pre-eclampsia last preg - Procardia    Impacted teeth     Resolved 10/12/2017     Maternal anemia with delivery, with current postpartum complication 11/11/2023    Admission Hgb 10.5. 3 hours PP 8.2 and PPD#1 5.9. 2 UPRBC given      Varicella     vaccinated    Ventricular septal defect      Past Surgical History:   Procedure Laterality Date    WISDOM TOOTH EXTRACTION       Family History   Problem Relation Age of Onset    Hypertension Mother     Endometriosis Mother     No Known Problems Father     Hypertension Maternal Grandmother     Diabetes Maternal Grandmother     Kidney failure Maternal Grandmother     Leukemia Maternal Grandfather     Heart disease Paternal Grandfather      Social History     Socioeconomic History    Marital status: /Civil Union     Spouse name: None    Number of children: None    Years of education: None    Highest education level: None   Occupational History    Occupation: Student   Tobacco Use    Smoking status: Never     Passive exposure: Never    Smokeless tobacco: Never   Vaping Use    Vaping status: Never Used   Substance and Sexual Activity    Alcohol use: Not Currently    Drug use: Not Currently    Sexual activity: Yes     Partners: Male   Other Topics Concern    None   Social History  "Narrative    None     Social Determinants of Health     Financial Resource Strain: Not on file   Food Insecurity: No Food Insecurity (2024)    Hunger Vital Sign     Worried About Running Out of Food in the Last Year: Never true     Ran Out of Food in the Last Year: Never true   Transportation Needs: No Transportation Needs (2024)    PRAPARE - Transportation     Lack of Transportation (Medical): No     Lack of Transportation (Non-Medical): No   Physical Activity: Inactive (2019)    Exercise Vital Sign     Days of Exercise per Week: 0 days     Minutes of Exercise per Session: 0 min   Stress: No Stress Concern Present (2019)    [employer] [place] [employer] - Occupational Stress Questionnaire     Feeling of Stress : Only a little   Social Connections: Not on file   Intimate Partner Violence: Not At Risk (10/10/2022)    Humiliation, Afraid, Rape, and Kick questionnaire     Fear of Current or Ex-Partner: No     Emotionally Abused: No     Physically Abused: No     Sexually Abused: No   Housing Stability: Low Risk  (2024)    Housing Stability Vital Sign     Unable to Pay for Housing in the Last Year: No     Number of Times Moved in the Last Year: 0     Homeless in the Last Year: No     Outpatient Medications Marked as Taking for the 10/1/24 encounter (Initial Prenatal) with Roger Dover MD   Medication    aspirin 81 mg chewable tablet    Prenatal Vit-Fe Fumarate-FA (PRENATAL VITAMINS PO)     Allergies   Allergen Reactions    Azithromycin Rash         Objective:   /68 (BP Location: Left arm, Patient Position: Sitting)   Ht 5' 3\" (1.6 m)   Wt 76.4 kg (168 lb 6.4 oz)   LMP 2024 (Exact Date)   BMI 29.83 kg/m²   Pregravid Weight/BMI: 77.1 kg (170 lb) (BMI 30.12)  Current Weight: 76.4 kg (168 lb 6.4 oz)   Total Weight Gain: -0.726 kg (-1 lb 9.6 oz)   Pre-Apollo Vitals      Flowsheet Row Most Recent Value   Prenatal Assessment    Fetal Heart Rate 155   Prenatal Vitals    Blood " Pressure 112/68   Weight - Scale 76.4 kg (168 lb 6.4 oz)   Urine Albumin/Glucose    Dilation/Effacement/Station    Vaginal Drainage    Edema            Chaperone present? Yes    General Appearance: alert and oriented, in no acute distress.   Respiratory: Unlabored breathing.  Cardiovascular: Regular rate, no peripheral edema.  Abdomen: Soft, non-tender, non-distended, no masses, no rebound or guarding.  Pelvic:       External genitalia: Normal appearance, no abnormal pigmentation, no lesions or masses.      Urinary system: Urethral meatus normal, bladder non-tender      Vaginal: normal mucosa without prolapse or lesions. Normal-appearing physiologic discharge.      Cervix: Normal-appearing, well-epithelialized, no gross lesions or masses. No cervical motion tenderness.      Adnexa: No adnexal masses or tenderness noted.      Uterus: No uterine tenderness.  Extremities: Normal range of motion. Warm, well-perfused, non-tender.   Skin: normal, no rash or abnormalities  Neurologic: alert, oriented x3  Psychiatric: Appropriate affect, mood stable, cooperative with exam.        Roger Dover MD  10/1/2024 5:16 PM

## 2024-10-01 ENCOUNTER — INITIAL PRENATAL (OUTPATIENT)
Dept: OBGYN CLINIC | Facility: CLINIC | Age: 24
End: 2024-10-01
Payer: COMMERCIAL

## 2024-10-01 VITALS
DIASTOLIC BLOOD PRESSURE: 68 MMHG | WEIGHT: 168.4 LBS | BODY MASS INDEX: 29.84 KG/M2 | SYSTOLIC BLOOD PRESSURE: 112 MMHG | HEIGHT: 63 IN

## 2024-10-01 DIAGNOSIS — Q24.9 MATERNAL CONGENITAL HEART DISEASE, ANTEPARTUM: ICD-10-CM

## 2024-10-01 DIAGNOSIS — O09.299 HISTORY OF POSTPARTUM HEMORRHAGE, CURRENTLY PREGNANT: ICD-10-CM

## 2024-10-01 DIAGNOSIS — Z3A.13 13 WEEKS GESTATION OF PREGNANCY: ICD-10-CM

## 2024-10-01 DIAGNOSIS — O09.899 SHORT INTERVAL BETWEEN PREGNANCIES AFFECTING PREGNANCY, ANTEPARTUM: Primary | ICD-10-CM

## 2024-10-01 DIAGNOSIS — O99.419 MATERNAL CONGENITAL HEART DISEASE, ANTEPARTUM: ICD-10-CM

## 2024-10-01 DIAGNOSIS — O09.299 HX OF PREECLAMPSIA, PRIOR PREGNANCY, CURRENTLY PREGNANT: ICD-10-CM

## 2024-10-01 LAB
EXTERNAL CHLAMYDIA SCREEN: NEGATIVE
EXTERNAL GONORRHEA SCREEN: NEGATIVE
SL AMB  POCT GLUCOSE, UA: NORMAL
SL AMB POCT URINE PROTEIN: NORMAL

## 2024-10-01 PROCEDURE — PNV: Performed by: STUDENT IN AN ORGANIZED HEALTH CARE EDUCATION/TRAINING PROGRAM

## 2024-10-01 PROCEDURE — 81002 URINALYSIS NONAUTO W/O SCOPE: CPT | Performed by: STUDENT IN AN ORGANIZED HEALTH CARE EDUCATION/TRAINING PROGRAM

## 2024-10-01 NOTE — ASSESSMENT & PLAN NOTE
Aware of associated risks such as with an increased risk of  birth and small-for-gestational-age newborns. Per MFM patient to undergo 3rd trimester growth US assessments. Patient aware of recommendations

## 2024-10-01 NOTE — ASSESSMENT & PLAN NOTE
Hx pre-eclampsia in prior pregnancy. Aware of risks of recurrence. Did not receive magnesium sulfate therapy. Did take Procardia postpartum, currently not on any medications. PIH labs for 1st trimester completed and unremarkable (P/C 0.07). ASA prescribed, taking 162 mg daily until 36 weeks. Normotensive at today's visit. Continue to monitor

## 2024-10-01 NOTE — ASSESSMENT & PLAN NOTE
Hx PPH + transfusion with  cc. Hb at that time was 5.9 and patient received 2U pRBC's. Initial Hb with prenatal labs 13.5. MFM reviewed this with patient at US visit and recommended optimization of Hb, consideration for TXA, and access to uterotonic agents. Continue to monitor and optimize Hb in pregnancy

## 2024-10-01 NOTE — ASSESSMENT & PLAN NOTE
Hx maternal VSD, spontaneously closed. Fetal echocardiogram recommended per [facility], ordered. Will await results.

## 2024-10-05 LAB
C TRACH RRNA SPEC QL NAA+PROBE: NEGATIVE
N GONORRHOEA RRNA SPEC QL NAA+PROBE: NEGATIVE

## 2024-10-29 ENCOUNTER — ROUTINE PRENATAL (OUTPATIENT)
Dept: OBGYN CLINIC | Facility: CLINIC | Age: 24
End: 2024-10-29
Payer: COMMERCIAL

## 2024-10-29 VITALS
DIASTOLIC BLOOD PRESSURE: 60 MMHG | HEIGHT: 63 IN | SYSTOLIC BLOOD PRESSURE: 100 MMHG | WEIGHT: 173 LBS | BODY MASS INDEX: 30.65 KG/M2

## 2024-10-29 DIAGNOSIS — O09.899 SHORT INTERVAL BETWEEN PREGNANCIES AFFECTING PREGNANCY, ANTEPARTUM: ICD-10-CM

## 2024-10-29 DIAGNOSIS — O99.419 MATERNAL CONGENITAL HEART DISEASE, ANTEPARTUM: ICD-10-CM

## 2024-10-29 DIAGNOSIS — O09.299 HX OF PREECLAMPSIA, PRIOR PREGNANCY, CURRENTLY PREGNANT: ICD-10-CM

## 2024-10-29 DIAGNOSIS — Q24.9 MATERNAL CONGENITAL HEART DISEASE, ANTEPARTUM: ICD-10-CM

## 2024-10-29 DIAGNOSIS — Z3A.17 17 WEEKS GESTATION OF PREGNANCY: Primary | ICD-10-CM

## 2024-10-29 DIAGNOSIS — O09.299 HISTORY OF POSTPARTUM HEMORRHAGE, CURRENTLY PREGNANT: ICD-10-CM

## 2024-10-29 LAB
SL AMB  POCT GLUCOSE, UA: NORMAL
SL AMB POCT URINE PROTEIN: NORMAL

## 2024-10-29 PROCEDURE — 81002 URINALYSIS NONAUTO W/O SCOPE: CPT | Performed by: OBSTETRICS & GYNECOLOGY

## 2024-10-29 PROCEDURE — PNV: Performed by: OBSTETRICS & GYNECOLOGY

## 2024-10-29 NOTE — PROGRESS NOTES
"Routine Prenatal Visit  [facility] - [address], [address], [place] [address]    Assessment/Plan:  Steve is a 24 y.o. year old  at 17w4d who presents for routine prenatal visit.     1. 17 weeks gestation of pregnancy  -     POCT urine dip  2. Hx of preeclampsia, prior pregnancy, currently pregnant        Subjective:     CC: Prenatal care    Steve Mahoney is a 24 y.o.  female who presents for routine prenatal care at 17w4d.  Pregnancy ROS: no  leakage of fluid, pelvic pain, or vaginal bleeding.  +  fetal movement.    The following portions of the patient's history were reviewed and updated as appropriate: allergies, current medications, past family history, past medical history, obstetric history, gynecologic history, past social history, past surgical history and problem list.      Objective:  /60 (BP Location: Left arm, Patient Position: Sitting, Cuff Size: Standard)   Ht 5' 3\" (1.6 m)   Wt 78.5 kg (173 lb)   LMP 2024 (Exact Date)   BMI 30.65 kg/m²   Pregravid Weight/BMI: 77.1 kg (170 lb) (BMI 30.12)  Current Weight: 78.5 kg (173 lb)   Total Weight Gain: 1.361 kg (3 lb)   Pre- Vitals      Flowsheet Row Most Recent Value   Prenatal Assessment    Fetal Heart Rate 156   Fundal Height (cm) 17 cm   Movement Absent   Prenatal Vitals    Blood Pressure 100/60   Weight - Scale 78.5 kg (173 lb)   Urine Albumin/Glucose    Dilation/Effacement/Station    Vaginal Drainage    Draining Fluid No   Edema    LLE Edema None   RLE Edema None   Facial Edema None             General: Well appearing, no distress  Respiratory: Unlabored breathing  Cardiovascular: Regular rate.  Abdomen: Soft, gravid, nontender  Fundal Height: Appropriate for gestational age.  Extremities: Warm and well perfused.  Non tender.  "

## 2024-11-18 ENCOUNTER — ROUTINE PRENATAL (OUTPATIENT)
Dept: PERINATAL CARE | Facility: OTHER | Age: 24
End: 2024-11-18
Payer: COMMERCIAL

## 2024-11-18 VITALS
HEIGHT: 63 IN | BODY MASS INDEX: 31.22 KG/M2 | DIASTOLIC BLOOD PRESSURE: 62 MMHG | HEART RATE: 107 BPM | WEIGHT: 176.2 LBS | SYSTOLIC BLOOD PRESSURE: 128 MMHG

## 2024-11-18 DIAGNOSIS — Q24.9 MATERNAL CONGENITAL HEART DISEASE, ANTEPARTUM: ICD-10-CM

## 2024-11-18 DIAGNOSIS — Z36.86 ENCOUNTER FOR ANTENATAL SCREENING FOR CERVICAL LENGTH: ICD-10-CM

## 2024-11-18 DIAGNOSIS — Z3A.20 20 WEEKS GESTATION OF PREGNANCY: Primary | ICD-10-CM

## 2024-11-18 DIAGNOSIS — O99.419 MATERNAL CONGENITAL HEART DISEASE, ANTEPARTUM: ICD-10-CM

## 2024-11-18 PROCEDURE — 76817 TRANSVAGINAL US OBSTETRIC: CPT | Performed by: OBSTETRICS & GYNECOLOGY

## 2024-11-18 PROCEDURE — 76811 OB US DETAILED SNGL FETUS: CPT | Performed by: OBSTETRICS & GYNECOLOGY

## 2024-11-18 PROCEDURE — 99213 OFFICE O/P EST LOW 20 MIN: CPT | Performed by: OBSTETRICS & GYNECOLOGY

## 2024-11-18 NOTE — PROGRESS NOTES
Ultrasound Probe Disinfection    A transvaginal ultrasound was performed.   Prior to use, disinfection was performed with High Level Disinfection Process ([id number]).  Probe serial number [id number] was used.    Candace Orellana  11/18/24  7:59 AM

## 2024-11-18 NOTE — PROGRESS NOTES
The patient was seen today for an ultrasound.  Please see ultrasound report (located under Ob Procedures) for additional details.   Thank you very much for allowing us to participate in the care of this very nice patient.  Should you have any questions, please do not hesitate to contact me.     Andry Carter MD FACOG  Attending Physician, Maternal-Fetal Medicine  [facility]

## 2024-11-25 ENCOUNTER — ROUTINE PRENATAL (OUTPATIENT)
Dept: OBGYN CLINIC | Facility: CLINIC | Age: 24
End: 2024-11-25
Payer: COMMERCIAL

## 2024-11-25 VITALS
HEIGHT: 63 IN | SYSTOLIC BLOOD PRESSURE: 110 MMHG | BODY MASS INDEX: 31.86 KG/M2 | DIASTOLIC BLOOD PRESSURE: 72 MMHG | WEIGHT: 179.8 LBS

## 2024-11-25 DIAGNOSIS — Q24.9 MATERNAL CONGENITAL HEART DISEASE, ANTEPARTUM: ICD-10-CM

## 2024-11-25 DIAGNOSIS — O09.299 HX OF PREECLAMPSIA, PRIOR PREGNANCY, CURRENTLY PREGNANT: Primary | ICD-10-CM

## 2024-11-25 DIAGNOSIS — O99.419 MATERNAL CONGENITAL HEART DISEASE, ANTEPARTUM: ICD-10-CM

## 2024-11-25 DIAGNOSIS — Z3A.21 21 WEEKS GESTATION OF PREGNANCY: ICD-10-CM

## 2024-11-25 DIAGNOSIS — O09.299 HISTORY OF POSTPARTUM HEMORRHAGE, CURRENTLY PREGNANT: ICD-10-CM

## 2024-11-25 DIAGNOSIS — O09.899 SHORT INTERVAL BETWEEN PREGNANCIES AFFECTING PREGNANCY, ANTEPARTUM: ICD-10-CM

## 2024-11-25 LAB
SL AMB  POCT GLUCOSE, UA: NORMAL
SL AMB POCT URINE PROTEIN: NORMAL

## 2024-11-25 PROCEDURE — 81002 URINALYSIS NONAUTO W/O SCOPE: CPT | Performed by: STUDENT IN AN ORGANIZED HEALTH CARE EDUCATION/TRAINING PROGRAM

## 2024-11-25 PROCEDURE — PNV: Performed by: STUDENT IN AN ORGANIZED HEALTH CARE EDUCATION/TRAINING PROGRAM

## 2024-11-25 NOTE — ASSESSMENT & PLAN NOTE
- PTL/PPROM/Bleeding precautions given.   - MFM consult report from level II ultrasound reviewed.   - Problem list updated, results console reviewed and updated with pertinent prenatal labs.  - PMH, PSH, medications reviewed and updated as needed.  - Return to office in 4 wk(s) for routine prenatal care.    Orders:    POCT urine dip

## 2024-11-25 NOTE — PROGRESS NOTES
"Routine Prenatal Visit  [facility] - [place]  [address], [place] [address]  Assessment & Plan  Hx of preeclampsia, prior pregnancy, currently pregnant  - Contine  mg PO daily until 36 weeks for pre-eclampsia risk reduction.        Short interval between pregnancies affecting pregnancy, antepartum  - Growth ultrasound is scheduled with MFM at 32 weeks.        History of postpartum hemorrhage, currently pregnant         Maternal congenital heart disease, antepartum  - Fetal echocardiogram is scheduled for Friday.        21 weeks gestation of pregnancy  - PTL/PPROM/Bleeding precautions given.   - MFM consult report from level II ultrasound reviewed.   - Problem list updated, results console reviewed and updated with pertinent prenatal labs.  - PMH, PSH, medications reviewed and updated as needed.  - Return to office in 4 wk(s) for routine prenatal care.    Orders:    POCT urine dip    Subjective:   Steve Mahoney is a 24 y.o.  who presents for routine prenatal care at 21w3d.  Complaints today: None  LOF: No; VB: No; Contractions: No; FM: Present    Objective:  /72 (BP Location: Right arm, Patient Position: Sitting, Cuff Size: Standard)   Ht 5' 3\" (1.6 m)   Wt 81.6 kg (179 lb 12.8 oz)   LMP 2024 (Exact Date)   BMI 31.85 kg/m²     General: Well appearing, no distress  Respiratory: Unlabored breathing  Cardiovascular: Regular rate.  Abdomen: Soft, gravid, nontender  Extremities: Warm and well perfused.  Non tender.    Pregravid Weight/BMI: 77.1 kg (170 lb) (BMI 30.12)  Current Weight: 81.6 kg (179 lb 12.8 oz)   Total Weight Gain: 4.445 kg (9 lb 12.8 oz)     Pre- Vitals      Flowsheet Row Most Recent Value   Prenatal Assessment    Fetal Heart Rate 150   Fundal Height (cm) 21 cm   Prenatal Vitals    Blood Pressure 110/72   Weight - Scale 81.6 kg (179 lb 12.8 oz)   Urine Albumin/Glucose    Dilation/Effacement/Station    Vaginal Drainage    Draining Fluid No   Edema  "   LLE Edema None   RLE Edema None   Facial Edema None             Felice Arrington MD  11/25/2024 4:24 PM

## 2024-11-29 ENCOUNTER — ROUTINE PRENATAL (OUTPATIENT)
Dept: PERINATAL CARE | Facility: OTHER | Age: 24
End: 2024-11-29
Payer: COMMERCIAL

## 2024-11-29 VITALS
DIASTOLIC BLOOD PRESSURE: 64 MMHG | BODY MASS INDEX: 31.4 KG/M2 | WEIGHT: 177.2 LBS | HEART RATE: 96 BPM | SYSTOLIC BLOOD PRESSURE: 106 MMHG | HEIGHT: 63 IN

## 2024-11-29 DIAGNOSIS — O99.419 MATERNAL CONGENITAL HEART DISEASE, ANTEPARTUM: Primary | ICD-10-CM

## 2024-11-29 DIAGNOSIS — Q24.9 MATERNAL CONGENITAL HEART DISEASE, ANTEPARTUM: Primary | ICD-10-CM

## 2024-11-29 DIAGNOSIS — Z3A.21 21 WEEKS GESTATION OF PREGNANCY: ICD-10-CM

## 2024-11-29 PROCEDURE — 76825 ECHO EXAM OF FETAL HEART: CPT | Performed by: OBSTETRICS & GYNECOLOGY

## 2024-11-29 PROCEDURE — 76827 ECHO EXAM OF FETAL HEART: CPT | Performed by: OBSTETRICS & GYNECOLOGY

## 2024-11-29 PROCEDURE — 93325 DOPPLER ECHO COLOR FLOW MAPG: CPT | Performed by: OBSTETRICS & GYNECOLOGY

## 2024-11-29 PROCEDURE — 99213 OFFICE O/P EST LOW 20 MIN: CPT | Performed by: OBSTETRICS & GYNECOLOGY

## 2024-11-29 NOTE — PROGRESS NOTES
Steve Mahoney  has no complaints today at 22w0d. She does not report any vaginal bleeding or signs of labor.  She is here today for an ultrasound for fetal echo and to review the prior missed anatomy not seen well on her level 2 scan.    Problem list:  Prior preeclampsia  Maternal History of a VSD that closed on its own  Short interval pregnancy  Postpartum hemorrhage history    Ultrasound findings:  The ultrasound today shows normal interval fetal growth and fluid, normal cervical length, and no malformations were detected.    Pregnancy ultrasound has limitations and is unable to detect all forms of fetal congenital abnormalities.      Follow up recommended:   Recommend a follow-up ultrasound for growth at 32 weeks.    Pre visit time reviewing her records   5 minutes  Face to face time 5 minutes  Post visit time on documentation of note, updating her problem list, adding orders and prescriptions 5 minutes.  Procedures that were completed today were charged separately.   The level of decision making was straight forward.    Rima Mcdowell MD

## 2024-11-29 NOTE — LETTER
November 29, 2024     Roger Dover MD  [address]   [address]  [place] [address]    Patient: Steve Mahoney   YOB: 2000   Date of Visit: 11/29/2024       Dear Dr. Dover:    Thank you for referring Steve Mahoney to me for evaluation. Below are my notes for this consultation.    If you have questions, please do not hesitate to call me. I look forward to following your patient along with you.         Sincerely,        Rima Mcdowell MD        CC: No Recipients    Rima Mcdowell MD  11/29/2024  8:57 AM  Sign when Signing Visit  Steve Mahoney  has no complaints today at 22w0d. She does not report any vaginal bleeding or signs of labor.  She is here today for an ultrasound for fetal echo and to review the prior missed anatomy not seen well on her level 2 scan.    Problem list:  Prior preeclampsia  Maternal History of a VSD that closed on its own  Short interval pregnancy  Postpartum hemorrhage history    Ultrasound findings:  The ultrasound today shows normal interval fetal growth and fluid, normal cervical length, and no malformations were detected.    Pregnancy ultrasound has limitations and is unable to detect all forms of fetal congenital abnormalities.      Follow up recommended:   Recommend a follow-up ultrasound for growth at 32 weeks.    Pre visit time reviewing her records   5 minutes  Face to face time 5 minutes  Post visit time on documentation of note, updating her problem list, adding orders and prescriptions 5 minutes.  Procedures that were completed today were charged separately.   The level of decision making was straight forward.    Rima Mcdowell MD

## 2024-12-17 ENCOUNTER — TELEPHONE (OUTPATIENT)
Dept: OBGYN CLINIC | Facility: CLINIC | Age: 24
End: 2024-12-17

## 2024-12-17 NOTE — TELEPHONE ENCOUNTER
2nd trimester call - 24 wks 4 days - left message patient's VM & MyChart message sent to pt    Overall how are you doing?     Compliant with routine OB care appointments?   - yes    Have you completed your 1st trimester labs? -yes    If you had NIPS with MFM, do you have a order for MSAFP?   - patient declined NIPT    Have you seen MFM and do you have your detailed US scheduled?   - had detailed US & fetal echo - has follow up US scheduled for 2/10/2025    Pregnancy Education-have you had a chance to review the classes offered and registered?

## 2024-12-24 ENCOUNTER — ROUTINE PRENATAL (OUTPATIENT)
Dept: OBGYN CLINIC | Facility: CLINIC | Age: 24
End: 2024-12-24
Payer: COMMERCIAL

## 2024-12-24 VITALS
HEIGHT: 63 IN | SYSTOLIC BLOOD PRESSURE: 120 MMHG | BODY MASS INDEX: 32.25 KG/M2 | DIASTOLIC BLOOD PRESSURE: 62 MMHG | WEIGHT: 182 LBS

## 2024-12-24 DIAGNOSIS — Z3A.25 25 WEEKS GESTATION OF PREGNANCY: ICD-10-CM

## 2024-12-24 DIAGNOSIS — R00.2 PALPITATIONS: Primary | ICD-10-CM

## 2024-12-24 DIAGNOSIS — Q24.9 MATERNAL CONGENITAL HEART DISEASE, ANTEPARTUM: ICD-10-CM

## 2024-12-24 DIAGNOSIS — O99.419 MATERNAL CONGENITAL HEART DISEASE, ANTEPARTUM: ICD-10-CM

## 2024-12-24 DIAGNOSIS — O09.299 HISTORY OF POSTPARTUM HEMORRHAGE, CURRENTLY PREGNANT: ICD-10-CM

## 2024-12-24 DIAGNOSIS — O09.899 SHORT INTERVAL BETWEEN PREGNANCIES AFFECTING PREGNANCY, ANTEPARTUM: ICD-10-CM

## 2024-12-24 DIAGNOSIS — R03.0 ELEVATED BP WITHOUT DIAGNOSIS OF HYPERTENSION: ICD-10-CM

## 2024-12-24 DIAGNOSIS — Z36.85 ANTENATAL SCREENING FOR STREPTOCOCCUS B: ICD-10-CM

## 2024-12-24 DIAGNOSIS — O09.299 HX OF PREECLAMPSIA, PRIOR PREGNANCY, CURRENTLY PREGNANT: ICD-10-CM

## 2024-12-24 LAB
SL AMB  POCT GLUCOSE, UA: NORMAL
SL AMB POCT URINE PROTEIN: NORMAL

## 2024-12-24 PROCEDURE — PNV: Performed by: STUDENT IN AN ORGANIZED HEALTH CARE EDUCATION/TRAINING PROGRAM

## 2024-12-24 PROCEDURE — 81002 URINALYSIS NONAUTO W/O SCOPE: CPT | Performed by: STUDENT IN AN ORGANIZED HEALTH CARE EDUCATION/TRAINING PROGRAM

## 2024-12-24 NOTE — ASSESSMENT & PLAN NOTE
- Labs up to date   - 3rd trimester labs ordered   - Last US, level II, unremarkable  - Scheduled for 32 week follow up US with MFM   - Declined flu vaccine today   - RTC for 28 week visit   Orders:    POCT urine dip    CBC and differential; Future

## 2024-12-24 NOTE — PROGRESS NOTES
"Routine Prenatal Visit  [facility] - [place] [address], [address], [place] [address]  Assessment & Plan  25 weeks gestation of pregnancy  - Labs up to date   - 3rd trimester labs ordered   - Last US, level II, unremarkable  - Scheduled for 32 week follow up US with MFM   - Declined flu vaccine today   - RTC for 28 week visit   Orders:    POCT urine dip    CBC and differential; Future     screening for streptococcus B    Orders:    Glucose, 1H PG; Future    RPR, Rfx Qn RPR/Confirm TP; Future    Elevated BP without diagnosis of hypertension  - BP today normotensive         Hx of preeclampsia, prior pregnancy, currently pregnant  - Contine  mg PO daily until 36 weeks for pre-eclampsia risk reduction.          History of postpartum hemorrhage, currently pregnant         Short interval between pregnancies affecting pregnancy, antepartum  - Growth ultrasound is scheduled with [facility] at 32 weeks.            Maternal congenital heart disease, antepartum  - Fetal echocardiogram demonstrated normal fetal cardiovascular anatomy and  function.           Palpitations    Orders:    TSH + Free T4; Future    Ambulatory Referral to Cardiology; Future    Subjective:   Steve Mahoney is a 24 y.o.  who presents for routine prenatal care at 25w4d.  Complaints today:     During postpartum period last time was told she had a heart murmur.   Lately has noted heart palpitations, fluttering sensation   No CP or SOB    LOF: no; VB: no; Contractions: no; FM: yes     Objective:  /62 (BP Location: Right arm, Patient Position: Sitting, Cuff Size: Standard)   Ht 5' 3\" (1.6 m)   Wt 82.6 kg (182 lb)   LMP 2024 (Exact Date)   BMI 32.24 kg/m²     General: Well appearing, no distress  Respiratory: Unlabored breathing  Cardiovascular: Regular rate.  Abdomen: Soft, gravid, nontender  Extremities: Warm and well perfused.  Non tender.    Pregravid Weight/BMI: 77.1 kg (170 lb) (BMI 30.12)  Current " Weight: 82.6 kg (182 lb)   Total Weight Gain: 5.443 kg (12 lb)     Pre- Vitals      Flowsheet Row Most Recent Value   Prenatal Assessment    Fetal Heart Rate 155   Fundal Height (cm) 25 cm   Prenatal Vitals    Blood Pressure 120/62   Weight - Scale 82.6 kg (182 lb)   Urine Albumin/Glucose    Dilation/Effacement/Station    Vaginal Drainage    Edema              Roger Dover MD  2024 12:25 PM

## 2025-01-11 LAB
EXTERNAL HEMOGLOBIN: 12 G/DL
EXTERNAL PLATELET COUNT: 205 K/ΜL
EXTERNAL SYPHILIS TOTAL IGG/IGM SCREENING: NORMAL

## 2025-01-20 ENCOUNTER — RESULTS FOLLOW-UP (OUTPATIENT)
Dept: OBGYN CLINIC | Facility: CLINIC | Age: 25
End: 2025-01-20

## 2025-01-20 ENCOUNTER — ROUTINE PRENATAL (OUTPATIENT)
Dept: OBGYN CLINIC | Facility: CLINIC | Age: 25
End: 2025-01-20
Payer: COMMERCIAL

## 2025-01-20 VITALS
WEIGHT: 186 LBS | SYSTOLIC BLOOD PRESSURE: 112 MMHG | DIASTOLIC BLOOD PRESSURE: 74 MMHG | BODY MASS INDEX: 32.96 KG/M2 | HEIGHT: 63 IN

## 2025-01-20 DIAGNOSIS — O09.899 SHORT INTERVAL BETWEEN PREGNANCIES AFFECTING PREGNANCY, ANTEPARTUM: Primary | ICD-10-CM

## 2025-01-20 DIAGNOSIS — Z23 NEED FOR TDAP VACCINATION: ICD-10-CM

## 2025-01-20 DIAGNOSIS — O99.419 MATERNAL CONGENITAL HEART DISEASE, ANTEPARTUM: ICD-10-CM

## 2025-01-20 DIAGNOSIS — Q24.9 MATERNAL CONGENITAL HEART DISEASE, ANTEPARTUM: ICD-10-CM

## 2025-01-20 DIAGNOSIS — Z3A.29 29 WEEKS GESTATION OF PREGNANCY: ICD-10-CM

## 2025-01-20 DIAGNOSIS — O09.299 HX OF PREECLAMPSIA, PRIOR PREGNANCY, CURRENTLY PREGNANT: ICD-10-CM

## 2025-01-20 LAB
BASOPHILS # BLD AUTO: 0 X10E3/UL (ref 0–0.2)
BASOPHILS NFR BLD AUTO: 0 %
EOSINOPHIL # BLD AUTO: 0.1 X10E3/UL (ref 0–0.4)
EOSINOPHIL NFR BLD AUTO: 1 %
ERYTHROCYTE [DISTWIDTH] IN BLOOD BY AUTOMATED COUNT: 13.1 % (ref 11.7–15.4)
GLUCOSE 1H P 50 G GLC PO SERPL-MCNC: 97 MG/DL (ref 70–139)
HCT VFR BLD AUTO: 37.5 % (ref 34–46.6)
HGB BLD-MCNC: 12 G/DL (ref 11.1–15.9)
IMM GRANULOCYTES # BLD: 0.1 X10E3/UL (ref 0–0.1)
IMM GRANULOCYTES NFR BLD: 1 %
LYMPHOCYTES # BLD AUTO: 1.5 X10E3/UL (ref 0.7–3.1)
LYMPHOCYTES NFR BLD AUTO: 12 %
MCH RBC QN AUTO: 28.6 PG (ref 26.6–33)
MCHC RBC AUTO-ENTMCNC: 32 G/DL (ref 31.5–35.7)
MCV RBC AUTO: 89 FL (ref 79–97)
MONOCYTES # BLD AUTO: 0.6 X10E3/UL (ref 0.1–0.9)
MONOCYTES NFR BLD AUTO: 4 %
NEUTROPHILS # BLD AUTO: 10.1 X10E3/UL (ref 1.4–7)
NEUTROPHILS NFR BLD AUTO: 82 %
PLATELET # BLD AUTO: 205 X10E3/UL (ref 150–450)
RBC # BLD AUTO: 4.2 X10E6/UL (ref 3.77–5.28)
RPR SER QL: NON REACTIVE
SL AMB  POCT GLUCOSE, UA: NORMAL
SL AMB POCT URINE PROTEIN: NORMAL
T4 FREE SERPL DIALY-MCNC: 0.89 NG/DL
TSH SERPL-ACNC: 1.5 UU/ML
WBC # BLD AUTO: 12.4 X10E3/UL (ref 3.4–10.8)

## 2025-01-20 PROCEDURE — 90471 IMMUNIZATION ADMIN: CPT | Performed by: OBSTETRICS & GYNECOLOGY

## 2025-01-20 PROCEDURE — 81002 URINALYSIS NONAUTO W/O SCOPE: CPT | Performed by: OBSTETRICS & GYNECOLOGY

## 2025-01-20 PROCEDURE — PNV: Performed by: OBSTETRICS & GYNECOLOGY

## 2025-01-20 PROCEDURE — 90715 TDAP VACCINE 7 YRS/> IM: CPT | Performed by: OBSTETRICS & GYNECOLOGY

## 2025-01-20 NOTE — PROGRESS NOTES
"Routine Prenatal Visit  [facility] [address], [address], [place] [address]    Assessment/Plan:  Steve is a 24 y.o. year old  at 29w3d who presents for routine prenatal visit.     1. Short interval between pregnancies affecting pregnancy, antepartum  Assessment & Plan:  Growth @ 32 weeks  2. Maternal congenital heart disease, antepartum  Assessment & Plan:  Normal echo  3. 29 weeks gestation of pregnancy  Assessment & Plan:  Normal 1hr, normal CBC. TDAP today  Orders:  -     POCT urine dip  4. Need for Tdap vaccination  -     TDAP VACCINE GREATER THAN OR EQUAL TO 6YO IM  5. Hx of preeclampsia, prior pregnancy, currently pregnant        Subjective:   Steve Mahoney is a 24 y.o.  who presents for routine prenatal care at 29w3d.  Complaints today: Denies  LOF: -; VB: -; Contractions: -; FM: +    Objective:  /74 (BP Location: Right arm, Patient Position: Sitting, Cuff Size: Standard)   Ht 5' 3\" (1.6 m)   Wt 84.4 kg (186 lb)   LMP 2024 (Exact Date)   BMI 32.95 kg/m²     General: Well appearing, no distress  Respiratory: Unlabored breathing  Abdomen: Soft, gravid, nontender  Extremities: Warm and well perfused.  Non tender.    Pregravid Weight/BMI: 77.1 kg (170 lb) (BMI 30.12)  Current Weight: 84.4 kg (186 lb)   Total Weight Gain: 7.258 kg (16 lb)     Pre- Vitals      Flowsheet Row Most Recent Value   Prenatal Assessment    Fetal Heart Rate 147   Fundal Height (cm) 29 cm   Movement Present   Prenatal Vitals    Blood Pressure 112/74   Weight - Scale 84.4 kg (186 lb)   Urine Albumin/Glucose    Dilation/Effacement/Station    Vaginal Drainage    Edema              Kiml HENRIQUE Vail DO  2025 10:18 AM     "

## 2025-01-22 LAB
DME PARACHUTE DELIVERY DATE REQUESTED: NORMAL
DME PARACHUTE ITEM DESCRIPTION: NORMAL
DME PARACHUTE ORDER STATUS: NORMAL
DME PARACHUTE SUPPLIER NAME: NORMAL
DME PARACHUTE SUPPLIER PHONE: NORMAL

## 2025-01-31 ENCOUNTER — TELEPHONE (OUTPATIENT)
Dept: OBGYN CLINIC | Facility: CLINIC | Age: 25
End: 2025-01-31

## 2025-01-31 DIAGNOSIS — O09.899 SHORT INTERVAL BETWEEN PREGNANCIES AFFECTING PREGNANCY, ANTEPARTUM: ICD-10-CM

## 2025-01-31 DIAGNOSIS — O09.299 HX OF PREECLAMPSIA, PRIOR PREGNANCY, CURRENTLY PREGNANT: ICD-10-CM

## 2025-01-31 DIAGNOSIS — Q24.9 MATERNAL CONGENITAL HEART DISEASE, ANTEPARTUM: Primary | ICD-10-CM

## 2025-01-31 DIAGNOSIS — O99.419 MATERNAL CONGENITAL HEART DISEASE, ANTEPARTUM: Primary | ICD-10-CM

## 2025-01-31 DIAGNOSIS — Z3A.29 29 WEEKS GESTATION OF PREGNANCY: ICD-10-CM

## 2025-01-31 NOTE — TELEPHONE ENCOUNTER
Third Trimester call     Overall how are you feeling?   Feels she is doing good, baby is very active.    Compliant with routine OB appointments? Yes  Scheduled through 36 weeks     Have you completed your 3rd trimester lab work? yes    Have you reviewed the contents of 3rd trimester folder from office?    Have you decided on a pediatrician? yes    If yes, who [facility]     If no, reviewed practices and transferred call to [phone] to set up appointment with pediatric office.   Questions on paperwork to go back to office? no   Questions on the baby birth certificate and photography forms? no    Sent link for the Hospital Readiness Video via [employer]

## 2025-02-02 PROBLEM — Z3A.31 31 WEEKS GESTATION OF PREGNANCY: Status: ACTIVE | Noted: 2024-10-01

## 2025-02-05 ENCOUNTER — ROUTINE PRENATAL (OUTPATIENT)
Dept: OBGYN CLINIC | Facility: CLINIC | Age: 25
End: 2025-02-05
Payer: COMMERCIAL

## 2025-02-05 VITALS
BODY MASS INDEX: 33.13 KG/M2 | DIASTOLIC BLOOD PRESSURE: 70 MMHG | WEIGHT: 187 LBS | HEIGHT: 63 IN | SYSTOLIC BLOOD PRESSURE: 120 MMHG

## 2025-02-05 DIAGNOSIS — O99.419 MATERNAL CONGENITAL HEART DISEASE, ANTEPARTUM: ICD-10-CM

## 2025-02-05 DIAGNOSIS — Q24.9 MATERNAL CONGENITAL HEART DISEASE, ANTEPARTUM: ICD-10-CM

## 2025-02-05 DIAGNOSIS — Z3A.31 31 WEEKS GESTATION OF PREGNANCY: ICD-10-CM

## 2025-02-05 DIAGNOSIS — D64.9 BORDERLINE ANEMIA: ICD-10-CM

## 2025-02-05 DIAGNOSIS — O09.299 HX OF PREECLAMPSIA, PRIOR PREGNANCY, CURRENTLY PREGNANT: ICD-10-CM

## 2025-02-05 DIAGNOSIS — O09.899 SHORT INTERVAL BETWEEN PREGNANCIES AFFECTING PREGNANCY, ANTEPARTUM: Primary | ICD-10-CM

## 2025-02-05 LAB
SL AMB  POCT GLUCOSE, UA: NORMAL
SL AMB POCT URINE PROTEIN: NORMAL

## 2025-02-05 PROCEDURE — PNV: Performed by: OBSTETRICS & GYNECOLOGY

## 2025-02-05 PROCEDURE — 81002 URINALYSIS NONAUTO W/O SCOPE: CPT | Performed by: OBSTETRICS & GYNECOLOGY

## 2025-02-05 RX ORDER — CALCIUM CARBONATE 500 MG/1
1 TABLET, CHEWABLE ORAL DAILY
COMMUNITY

## 2025-02-05 NOTE — PROGRESS NOTES
"Routine Prenatal Visit  [facility] - [place] [address], [place] [address]    Assessment/Plan:  Steve is a 24 y.o. year old  at 31w5d who presents for routine prenatal visit.     Assessment & Plan  Short interval between pregnancies affecting pregnancy, antepartum  Growth u/s scheduled 2/10/2025      Hx of preeclampsia, prior pregnancy, currently pregnant  Continue ASA      Maternal congenital heart disease, antepartum  Maternal h/o VSD, closed.  Fetal echo normal this pregnancy.      Borderline anemia  Discussed could increase iron in diet or take supplement.  We strongly recom supplement was hgb <11.0, hers was 12.0g/dl.  May cause constipation.       31 weeks gestation of pregnancy  Discussed Pepcid for heartburn.    Orders:    POCT urine dip      Next OB Visit 2 weeks.    Subjective:     CC: Prenatal care    Steve Mahoney is a 24 y.o.  female who presents for routine prenatal care at 31w5d.  Pregnancy ROS: no leakage of fluid, pelvic pain, or vaginal bleeding.  normal fetal movement.    The following portions of the patient's history were reviewed and updated as appropriate: allergies, current medications, past family history, past medical history, obstetric history, gynecologic history, past social history, past surgical history and problem list.      Objective:  /70 (BP Location: Left arm, Patient Position: Sitting, Cuff Size: Standard)   Ht 5' 3\" (1.6 m)   Wt 84.8 kg (187 lb)   LMP 2024 (Exact Date)   BMI 33.13 kg/m²   Pregravid Weight/BMI: 77.1 kg (170 lb) (BMI 30.12)  Current Weight: 84.8 kg (187 lb)   Total Weight Gain: 7.711 kg (17 lb)   Pre-Apollo Vitals      Flowsheet Row Most Recent Value   Prenatal Assessment    Fetal Heart Rate 155   Fundal Height (cm) 32 cm   Movement Present   Prenatal Vitals    Blood Pressure 120/70   Weight - Scale 84.8 kg (187 lb)   Urine Albumin/Glucose    Dilation/Effacement/Station    Vaginal Drainage    Edema  "   LLE Edema None   RLE Edema None             General: Well appearing, no distress  Abdomen: Soft, gravid, nontender  Extremities: Non tender.

## 2025-02-07 LAB
DME PARACHUTE DELIVERY DATE ACTUAL: NORMAL
DME PARACHUTE DELIVERY DATE REQUESTED: NORMAL
DME PARACHUTE ITEM DESCRIPTION: NORMAL
DME PARACHUTE ORDER STATUS: NORMAL
DME PARACHUTE SUPPLIER NAME: NORMAL
DME PARACHUTE SUPPLIER PHONE: NORMAL

## 2025-02-10 ENCOUNTER — ULTRASOUND (OUTPATIENT)
Dept: PERINATAL CARE | Facility: OTHER | Age: 25
End: 2025-02-10
Payer: COMMERCIAL

## 2025-02-10 VITALS
SYSTOLIC BLOOD PRESSURE: 112 MMHG | WEIGHT: 187 LBS | BODY MASS INDEX: 33.13 KG/M2 | HEART RATE: 105 BPM | HEIGHT: 63 IN | DIASTOLIC BLOOD PRESSURE: 84 MMHG

## 2025-02-10 DIAGNOSIS — Z3A.32 32 WEEKS GESTATION OF PREGNANCY: Primary | ICD-10-CM

## 2025-02-10 PROCEDURE — 76816 OB US FOLLOW-UP PER FETUS: CPT | Performed by: OBSTETRICS & GYNECOLOGY

## 2025-02-10 PROCEDURE — 99213 OFFICE O/P EST LOW 20 MIN: CPT | Performed by: OBSTETRICS & GYNECOLOGY

## 2025-02-10 NOTE — PROGRESS NOTES
The patient was seen today for an ultrasound.  Please see ultrasound report (located under Ob Procedures) for additional details.   Thank you very much for allowing us to participate in the care of this very nice patient.  Should you have any questions, please do not hesitate to contact me.     Andry Carter MD FACOG  Attending Physician, Maternal-Fetal Medicine  [facility]

## 2025-02-24 ENCOUNTER — ROUTINE PRENATAL (OUTPATIENT)
Dept: OBGYN CLINIC | Facility: CLINIC | Age: 25
End: 2025-02-24
Payer: COMMERCIAL

## 2025-02-24 VITALS
SYSTOLIC BLOOD PRESSURE: 126 MMHG | DIASTOLIC BLOOD PRESSURE: 70 MMHG | BODY MASS INDEX: 34.2 KG/M2 | WEIGHT: 193 LBS | HEIGHT: 63 IN

## 2025-02-24 DIAGNOSIS — O09.899 SHORT INTERVAL BETWEEN PREGNANCIES AFFECTING PREGNANCY, ANTEPARTUM: ICD-10-CM

## 2025-02-24 DIAGNOSIS — Z3A.34 34 WEEKS GESTATION OF PREGNANCY: Primary | ICD-10-CM

## 2025-02-24 DIAGNOSIS — O09.299 HX OF PREECLAMPSIA, PRIOR PREGNANCY, CURRENTLY PREGNANT: ICD-10-CM

## 2025-02-24 DIAGNOSIS — Q24.9 MATERNAL CONGENITAL HEART DISEASE, ANTEPARTUM: ICD-10-CM

## 2025-02-24 DIAGNOSIS — O99.419 MATERNAL CONGENITAL HEART DISEASE, ANTEPARTUM: ICD-10-CM

## 2025-02-24 LAB
SL AMB  POCT GLUCOSE, UA: NORMAL
SL AMB POCT URINE PROTEIN: NORMAL

## 2025-02-24 PROCEDURE — 81002 URINALYSIS NONAUTO W/O SCOPE: CPT | Performed by: OBSTETRICS & GYNECOLOGY

## 2025-02-24 PROCEDURE — PNV: Performed by: OBSTETRICS & GYNECOLOGY

## 2025-02-24 RX ORDER — FAMOTIDINE 10 MG
10 TABLET ORAL 2 TIMES DAILY
COMMUNITY

## 2025-02-24 NOTE — PROGRESS NOTES
"Routine Prenatal Visit  [facility] - [address], [address], [place] [address]    Assessment/Plan:  Steve is a 24 y.o. year old  at 34w3d who presents for routine prenatal visit.     1. 34 weeks gestation of pregnancy  -     POCT urine dip  2. Maternal congenital heart disease, antepartum  3. Short interval between pregnancies affecting pregnancy, antepartum  4. Hx of preeclampsia, prior pregnancy, currently pregnant  5. Breech presentation, single or unspecified fetus        Subjective:     CC: Prenatal care    Steve Mahoney is a 24 y.o.  female who presents for routine prenatal care at 34w3d.  Pregnancy ROS: no  leakage of fluid, pelvic pain, or vaginal bleeding.  + fetal movement.    The following portions of the patient's history were reviewed and updated as appropriate: allergies, current medications, past family history, past medical history, obstetric history, gynecologic history, past social history, past surgical history and problem list.      Objective:  /70 (BP Location: Left arm, Patient Position: Sitting, Cuff Size: Standard)   Ht 5' 3\" (1.6 m)   Wt 87.5 kg (193 lb)   LMP 2024 (Exact Date)   BMI 34.19 kg/m²   Pregravid Weight/BMI: 77.1 kg (170 lb) (BMI 30.12)  Current Weight: 87.5 kg (193 lb)   Total Weight Gain: 10.4 kg (23 lb)   Pre- Vitals    Flowsheet Row Most Recent Value   Prenatal Assessment    Fetal Heart Rate 152   Fundal Height (cm) 35 cm   Movement Present   Presentation Breech   Prenatal Vitals    Blood Pressure 126/70   Weight - Scale 87.5 kg (193 lb)   Urine Albumin/Glucose    Dilation/Effacement/Station    Vaginal Drainage    Draining Fluid No   Edema    LLE Edema None   RLE Edema None   Facial Edema None           General: Well appearing, no distress  Respiratory: Unlabored breathing  Cardiovascular: Regular rate.  Abdomen: Soft, gravid, nontender  Fundal Height: Appropriate for gestational age.  Extremities: Warm and well " perfused.  Non tender.

## 2025-02-24 NOTE — PATIENT INSTRUCTIONS
LABOR PRECAUTIONS  Call our office at [phone] for any of the following:    * I need to call immediately I if I have even a small amount of LIQUID  leaking from my vagina, with or without contractions.   * I need to call if I am BLEEDING an amount equal to or more than a period.  A small amount of bloody vaginal discharge is normal at the end of the pregnancy.   * I need to call if I am having CONTRACTIONS  every five minutes for at least an hour.  I will need a watch in order to time them.  I should time them from the beginning of one contraction until the beginning of the next one.   * I need to call BEFORE  I go to the hospital.   * I need to have a plan for TRANSPORTATION  to get to the hospital when I am in labor.  Labor is generally not an emergency which requires an ambulance.          FETAL KICK COUNTS    In the third trimester (after 28 weeks gestation) you should be performing fetal kick counts every day.  Your baby should move at least 10 times in 2 hours during an active time, once a day.    Choose atime of day when your baby is most active.  Try to do this around the same time each day.  Get into a comfortable position and then write down the time your baby first moves.  Count each movement until the baby moves 10 times.  These movements include kicks, punches, nudges, flutters, or rolls.  This can take anywhere from 5 minutes to 2 hours.  Write down the time you feel the baby's 10th movement.    If 2 hours has passed and your baby has not moved at least 10 times, you should CALL THE OFFICE RIGHT AWAY.  [phone]        PERINEAL / VAGINAL MASSAGE    What can I do now to decrease my chances of tearing during delivery?  Massaging around the vaginal opening by you (or your partner), either antepartum (before birth) or during the second stage of labor, can reduce the likelihood of perineal tearing during childbirth.  Likewise, the use of warm packs held on the perineum during the pushing stage of  labor can reduce the severity of your tear.  This will happen during the pushing stage of labor.  At home, you can also help reduce the chances of injury that may occur during the birth of your child through perineal massage.    When should I do this?  Starting around or shortly after 34 weeks of pregnancy, you or your partner should provide 5-10 minutes of vaginal massage 1-4 times per week.    How?  Use either almond, coconut, or olive oil and water mixture on 1 or 2 fingers (depending on comfort).  Insert finger(s) 3-5cm into the vagina.  Apply sweeping downward/sideward pressure from 3 to 9 o'clock for 5-10 minutes, 1-4 times per week.        GROUP B STREP    Group B Strep (GBS) is a common vaginal bacteria.  Approximately 25% of women normally have GBS bacteria present in the vagina.  It is NOT a sexually-transmitted infection.  In fact, it is not an infection AT ALL!  It is just a normal vaginal bacteria for many women.    However, the GBS bacteria can be dangerous to a  baby if the baby is exposed to that particular bacteria during labor and birth AND develops an infection from it.  The likelihood of a  GBS infection for a woman who has GBS bacteria in the vagina is about 1%-2%.  But if it does occur, a baby could become severely ill.    For this reason, we do a vaginal culture (Q-tip swab of the vagina and rectum) for ALL pregnant women at approximately 36 weeks of pregnancy.  If the culture shows that there is GBS bacteria present, it is NOT a reason to panic!  Because in this situation we will give this woman antibiotics through her IV while she is in labor.  When a mother is treated with antibiotics during labor and delivery, her baby ALMOST NEVER becomes infected with GBS bacteria.

## 2025-02-27 ENCOUNTER — CONSULT (OUTPATIENT)
Dept: CARDIOLOGY CLINIC | Facility: CLINIC | Age: 25
End: 2025-02-27
Payer: COMMERCIAL

## 2025-02-27 VITALS
SYSTOLIC BLOOD PRESSURE: 120 MMHG | HEIGHT: 63 IN | WEIGHT: 191 LBS | HEART RATE: 94 BPM | DIASTOLIC BLOOD PRESSURE: 62 MMHG | BODY MASS INDEX: 33.84 KG/M2

## 2025-02-27 DIAGNOSIS — Q21.0 VSD (VENTRICULAR SEPTAL DEFECT): Primary | ICD-10-CM

## 2025-02-27 DIAGNOSIS — R00.2 PALPITATIONS: ICD-10-CM

## 2025-02-27 PROCEDURE — 93000 ELECTROCARDIOGRAM COMPLETE: CPT | Performed by: INTERNAL MEDICINE

## 2025-02-27 PROCEDURE — 99204 OFFICE O/P NEW MOD 45 MIN: CPT | Performed by: INTERNAL MEDICINE

## 2025-02-27 RX ORDER — DOCUSATE SODIUM 100 MG/1
100 CAPSULE, LIQUID FILLED ORAL DAILY
COMMUNITY

## 2025-02-27 NOTE — PROGRESS NOTES
Outpatient Consultation - General Cardiology   Steve Mahoney 24 y.o. female   MRN: [id number]  Encounter: [id number]      PCP: Aaron Jackson DO      History of Present Illness   Physician Requesting Consult: Consults   Reason for Consult / Principal Problem: palpitations    Assessment & Plan   Steve Mahoney is a 24 y.o. year old female, with currently 34 weeks pregnant, hx of a musuclar VSD which sponteously closed around age 2-3, is presenting with palpitations.     #palpitations  Daily short duration palpitations for a few months now. No lightheadedness or persistent symptoms. Will order 24 hr holter.     #hx of muscular VSD with spontaneous closure  Patient brought in pediatric records.  Small muscular VSD found soon after birth.  It was closely monitored and it was not detected around age 2-3.  She has a soft murmur on exam-likely flow murmur- and unlikely to have residual VSD.  -TTE ordered    #current 34 weeks pregnant: no complications with pregnancy per patient.     HPI: Steve Mahoney is a 24 y.o. year old female, with currently 34 weeks pregnant, hx of a muscular VSD which sponteously closed around age 2-3, is presenting with palpitations.     The patient is currently 34 weeks pregnant with her second.  For the patient few months she has been feeling intermittent palpitations.  The palpitations sensation happens a few times a day and lasts seconds.  No chest pain, lightheadedness, shortness of breath, leg swelling.      She was told she had a murmur after birth of her first child.     Review of Systems  Review of system was conducted and was negative except for as stated in the HPI.      Historical Information   Past Medical History:   Diagnosis Date    Abnormal Pap smear of cervix     LGSIL on pap 3/2023    Allergic     History of transfusion     iron infusion post delivery    Hypertension     pre-eclampsia last preg - Procardia    Impacted teeth     Resolved 10/12/2017     Maternal anemia  "with delivery, with current postpartum complication 11/11/2023    Admission Hgb 10.5. 3 hours PP 8.2 and PPD#1 5.9. 2 UPRBC given      Varicella     vaccinated    Ventricular septal defect      Past Surgical History:   Procedure Laterality Date    WISDOM TOOTH EXTRACTION       Social History     Substance and Sexual Activity   Alcohol Use Not Currently     Social History     Substance and Sexual Activity   Drug Use Not Currently     Social History     Tobacco Use   Smoking Status Never    Passive exposure: Never   Smokeless Tobacco Never     Family History: mother had HTN    Meds/Allergies   Home Medications:   Current Outpatient Medications:     aspirin 81 mg chewable tablet, Chew 162 mg 2 (two) times a day, Disp: , Rfl:     calcium carbonate (TUMS) 500 mg chewable tablet, Chew 1 tablet daily, Disp: , Rfl:     docusate sodium (COLACE) 100 mg capsule, Take 100 mg by mouth daily, Disp: , Rfl:     famotidine (PEPCID) 10 mg tablet, Take 10 mg by mouth 2 (two) times a day, Disp: , Rfl:     Prenatal Vit-Fe Fumarate-FA (PRENATAL VITAMINS PO), Take by mouth, Disp: , Rfl:     Allergies   Allergen Reactions    Azithromycin Rash         Objective   Vitals: Blood pressure 120/62, pulse 94, height 5' 3\" (1.6 m), weight 86.6 kg (191 lb), last menstrual period 06/28/2024, not currently breastfeeding.      Physical Exam    GEN: Steve Mahoney appears well, alert and oriented x 3, pleasant and cooperative   HEENT:  Normocephalic, atraumatic, anicteric, moist mucous membranes  NECK: No JVD or carotid bruits   HEART: regular rhythm, regular rate, normal S1 and S2, 2/6 systolic murmur heard best at LUSB.   LUNGS: Clear to auscultation bilaterally; no wheezes, rales, or rhonchi; respiration nonlabored   ABDOMEN:  not examined  EXTREMITIES: peripheral pulses palpable; no edema  NEURO: no gross focal findings  SKIN:  Dry, intact, warm to touch    Lab Results: I have personally reviewed pertinent lab results.    No results found for: " "\"HSTNI0\", \"HSTNI2\", \"HSTNI4\", \"HSTNI\"  No results found for: \"NTBNP\"  No results found for: \"CHOL\", \"TRIG\", \"HDL\", \"LDLCALC\", \"LDLDIRECT\"  Lab Results   Component Value Date    K 4.2 09/19/2024    CO2 20 09/19/2024     09/19/2024    BUN 6 09/19/2024    CREATININE 0.52 (L) 09/19/2024    ALT 9 09/19/2024    AST 10 09/19/2024    TSH 1.5 01/11/2025     Lab Results   Component Value Date    WBC 12.4 (H) 01/11/2025    HGB 12.0 01/11/2025    HCT 37.5 01/11/2025    MCV 89 01/11/2025     01/11/2025       Case discussed and reviewed with Dr. Rosen who agrees with my assessment and plan.    Thank you for involving us in the care of your patient.      Hamlet Leavitt MD  Cardiology Fellow   PGY6    "

## 2025-03-04 ENCOUNTER — HOSPITAL ENCOUNTER (OUTPATIENT)
Dept: NON INVASIVE DIAGNOSTICS | Age: 25
Discharge: HOME | End: 2025-03-04
Payer: COMMERCIAL

## 2025-03-04 VITALS
BODY MASS INDEX: 33.84 KG/M2 | DIASTOLIC BLOOD PRESSURE: 78 MMHG | HEART RATE: 100 BPM | SYSTOLIC BLOOD PRESSURE: 136 MMHG | HEIGHT: 63 IN | WEIGHT: 191 LBS

## 2025-03-04 DIAGNOSIS — Q21.0 VSD (VENTRICULAR SEPTAL DEFECT): ICD-10-CM

## 2025-03-04 DIAGNOSIS — R00.2 PALPITATIONS: ICD-10-CM

## 2025-03-04 LAB
AORTIC ROOT: 2.3 CM
ASCENDING AORTA: 2.6 CM
BSA FOR ECHO PROCEDURE: 1.9 M2
DOP CALC LVOT AREA: 2.83 CM2
DOP CALC LVOT DIAMETER: 1.9 CM
E WAVE DECELERATION TIME: 138 MS
E/A RATIO: 1.82
FRACTIONAL SHORTENING: 34 (ref 28–44)
INTERVENTRICULAR SEPTUM IN DIASTOLE (PARASTERNAL SHORT AXIS VIEW): 0.8 CM
INTERVENTRICULAR SEPTUM: 0.8 CM (ref 0.6–1.1)
LAAS-AP2: 14.9 CM2
LAAS-AP4: 14.5 CM2
LEFT ATRIUM SIZE: 3.6 CM
LEFT ATRIUM VOLUME (MOD BIPLANE): 38 ML
LEFT ATRIUM VOLUME INDEX (MOD BIPLANE): 20 ML/M2
LEFT INTERNAL DIMENSION IN SYSTOLE: 2.7 CM (ref 2.1–4)
LEFT VENTRICULAR INTERNAL DIMENSION IN DIASTOLE: 4.1 CM (ref 3.5–6)
LEFT VENTRICULAR POSTERIOR WALL IN END DIASTOLE: 0.9 CM
LEFT VENTRICULAR STROKE VOLUME: 45 ML
LV EF US.2D.A4C+ESTIMATED: 66 %
LVSV (TEICH): 45 ML
MV E'TISSUE VEL-SEP: 16 CM/S
MV PEAK A VEL: 0.5 M/S
MV PEAK E VEL: 91 CM/S
MV STENOSIS PRESSURE HALF TIME: 40 MS
MV VALVE AREA P 1/2 METHOD: 5.5
RIGHT ATRIUM AREA SYSTOLE A4C: 10.8 CM2
RIGHT VENTRICLE ID DIMENSION: 3.3 CM
SL CV LEFT ATRIUM LENGTH A2C: 4.6 CM
SL CV LV EF: 65
SL CV PED ECHO LEFT VENTRICLE DIASTOLIC VOLUME (MOD BIPLANE) 2D: 72 ML
SL CV PED ECHO LEFT VENTRICLE SYSTOLIC VOLUME (MOD BIPLANE) 2D: 27 ML
TRICUSPID ANNULAR PLANE SYSTOLIC EXCURSION: 2.5 CM

## 2025-03-04 PROCEDURE — 93306 TTE W/DOPPLER COMPLETE: CPT

## 2025-03-04 PROCEDURE — 93306 TTE W/DOPPLER COMPLETE: CPT | Performed by: INTERNAL MEDICINE

## 2025-03-10 ENCOUNTER — TELEPHONE (OUTPATIENT)
Age: 25
End: 2025-03-10

## 2025-03-10 NOTE — TELEPHONE ENCOUNTER
Patient Steve [phone] contacting [facility] after office visit with Dr. Hamlet Leavitt on 2/27/2025.    Patient requesting to reschedule the holter monitor on 3/12/2025.    Patient inquiring if Dr. Leavitt would like her to have this rescheduled within a specific time frame?    Patient is 34 weeks pregnant.

## 2025-03-11 ENCOUNTER — ROUTINE PRENATAL (OUTPATIENT)
Dept: OBGYN CLINIC | Facility: CLINIC | Age: 25
End: 2025-03-11
Payer: COMMERCIAL

## 2025-03-11 VITALS
SYSTOLIC BLOOD PRESSURE: 130 MMHG | HEIGHT: 63 IN | BODY MASS INDEX: 34.02 KG/M2 | WEIGHT: 192 LBS | DIASTOLIC BLOOD PRESSURE: 80 MMHG

## 2025-03-11 DIAGNOSIS — Q24.9 MATERNAL CONGENITAL HEART DISEASE, ANTEPARTUM: ICD-10-CM

## 2025-03-11 DIAGNOSIS — O09.899 SHORT INTERVAL BETWEEN PREGNANCIES AFFECTING PREGNANCY, ANTEPARTUM: Primary | ICD-10-CM

## 2025-03-11 DIAGNOSIS — Z36.85 ANTENATAL SCREENING FOR STREPTOCOCCUS B: ICD-10-CM

## 2025-03-11 DIAGNOSIS — O09.299 HX OF PREECLAMPSIA, PRIOR PREGNANCY, CURRENTLY PREGNANT: ICD-10-CM

## 2025-03-11 DIAGNOSIS — O99.419 MATERNAL CONGENITAL HEART DISEASE, ANTEPARTUM: ICD-10-CM

## 2025-03-11 DIAGNOSIS — Z3A.36 36 WEEKS GESTATION OF PREGNANCY: ICD-10-CM

## 2025-03-11 LAB
SL AMB  POCT GLUCOSE, UA: NORMAL
SL AMB POCT URINE PROTEIN: NORMAL

## 2025-03-11 PROCEDURE — PNV: Performed by: OBSTETRICS & GYNECOLOGY

## 2025-03-11 PROCEDURE — 81002 URINALYSIS NONAUTO W/O SCOPE: CPT | Performed by: OBSTETRICS & GYNECOLOGY

## 2025-03-11 NOTE — PROGRESS NOTES
"Routine Prenatal Visit  [facility] [address], [address], [place] [address]    Assessment/Plan:  Steve is a 25 y.o. year old  at 36w4d who presents for routine prenatal visit.     1. Short interval between pregnancies affecting pregnancy, antepartum  2. Hx of preeclampsia, prior pregnancy, currently pregnant  3. Maternal congenital heart disease, antepartum  4. 36 weeks gestation of pregnancy  -     POCT urine dip  5.  screening for streptococcus B  -     Strep B DNA probe, amplification        Subjective:     CC: Prenatal care    Steve Mahoney is a 25 y.o.  female who presents for routine prenatal care at 36w4d.  Pregnancy ROS: no leakage of fluid, pelvic pain, or vaginal bleeding.  Normal fetal movement.    The following portions of the patient's history were reviewed and updated as appropriate: allergies, current medications, past family history, past medical history, obstetric history, gynecologic history, past social history, past surgical history and problem list.      Objective:  /80 (BP Location: Right arm, Patient Position: Sitting, Cuff Size: Standard)   Ht 5' 3\" (1.6 m)   Wt 87.1 kg (192 lb)   LMP 2024 (Exact Date)   BMI 34.01 kg/m²   Pregravid Weight/BMI: 77.1 kg (170 lb) (BMI 30.12)  Current Weight: 87.1 kg (192 lb)   Total Weight Gain: 9.979 kg (22 lb)   Pre-Apollo Vitals      Flowsheet Row Most Recent Value   Prenatal Assessment    Fetal Heart Rate 140   Fundal Height (cm) 37 cm   Movement Present   Presentation Vertex   Prenatal Vitals    Blood Pressure 130/80   Weight - Scale 87.1 kg (192 lb)   Urine Albumin/Glucose    Dilation/Effacement/Station    Vaginal Drainage    Edema              General: Well appearing, no distress  Respiratory: Unlabored breathing  Cardiovascular: Regular rate.  Abdomen: Soft, gravid, nontender  Fundal Height: Appropriate for gestational age.  Extremities: Warm and well perfused.  Non tender.  "

## 2025-03-11 NOTE — TELEPHONE ENCOUNTER
Patient calling back about rescheduling her holter monitor scheduled for tomorrow. Patient would like to reschedule for after her baby is born, around late April-early May.   She would like to know if cardiology is ok with her decision and timeframe to reschedule.  Please review and advise.

## 2025-03-13 LAB — GP B STREP DNA SPEC QL NAA+PROBE: NEGATIVE

## 2025-03-19 ENCOUNTER — ROUTINE PRENATAL (OUTPATIENT)
Dept: OBGYN CLINIC | Facility: CLINIC | Age: 25
End: 2025-03-19
Payer: COMMERCIAL

## 2025-03-19 VITALS
HEIGHT: 63 IN | SYSTOLIC BLOOD PRESSURE: 118 MMHG | WEIGHT: 196 LBS | DIASTOLIC BLOOD PRESSURE: 76 MMHG | BODY MASS INDEX: 34.73 KG/M2

## 2025-03-19 DIAGNOSIS — R03.0 ELEVATED BP WITHOUT DIAGNOSIS OF HYPERTENSION: ICD-10-CM

## 2025-03-19 DIAGNOSIS — O09.299 HISTORY OF POSTPARTUM HEMORRHAGE, CURRENTLY PREGNANT: ICD-10-CM

## 2025-03-19 DIAGNOSIS — Z3A.37 37 WEEKS GESTATION OF PREGNANCY: ICD-10-CM

## 2025-03-19 DIAGNOSIS — Q24.9 MATERNAL CONGENITAL HEART DISEASE, ANTEPARTUM: Primary | ICD-10-CM

## 2025-03-19 DIAGNOSIS — O99.419 MATERNAL CONGENITAL HEART DISEASE, ANTEPARTUM: Primary | ICD-10-CM

## 2025-03-19 DIAGNOSIS — R87.612 LGSIL OF CERVIX OF UNDETERMINED SIGNIFICANCE: ICD-10-CM

## 2025-03-19 DIAGNOSIS — O09.299 HX OF PREECLAMPSIA, PRIOR PREGNANCY, CURRENTLY PREGNANT: ICD-10-CM

## 2025-03-19 DIAGNOSIS — O09.899 SHORT INTERVAL BETWEEN PREGNANCIES AFFECTING PREGNANCY, ANTEPARTUM: ICD-10-CM

## 2025-03-19 LAB
SL AMB  POCT GLUCOSE, UA: NORMAL
SL AMB POCT URINE PROTEIN: NORMAL

## 2025-03-19 PROCEDURE — 81002 URINALYSIS NONAUTO W/O SCOPE: CPT | Performed by: STUDENT IN AN ORGANIZED HEALTH CARE EDUCATION/TRAINING PROGRAM

## 2025-03-19 PROCEDURE — PNV: Performed by: STUDENT IN AN ORGANIZED HEALTH CARE EDUCATION/TRAINING PROGRAM

## 2025-03-19 NOTE — PROGRESS NOTES
Routine Prenatal Visit  [facility] [place]  [address], [place] [address]  Assessment & Plan  37 weeks gestation of pregnancy  - Labs up to date   - US 2/10  Breech presentation  Fetal growth appeared normal  Placenta Location = Posterior  MEASUREMENTS (* Included In Average GA)  AC             29.15 cm        33 weeks 1 day * (71%)  BPD             7.95 cm        31 weeks 6 days* (27%)  HC             30.59 cm        34 weeks 1 day * (57%)  Femur           6.30 cm        32 weeks 4 days* (42%)     HC/AC           1.05 [0.96 - 1.17]                 (44%)  FL/AC             22 [20 - 24]  FL/BPD            79 [71 - 87]  EFW Hadlock 4   2089 grams - 4 lbs 10 oz  (58%)  - Vaccinations: S/p Tdap    - GBS negative   - RTC for 38 week Ob visit     Regarding sciatica pain, recommended heat therapy as well as stretching, use of birthing ball. Can also trial belly binder. Patient will reach back out after trial of this at home.     Orders:    POCT urine dip    Maternal congenital heart disease, antepartum  - Fetal echocardiogram demonstrated normal fetal cardiovascular anatomy and function.       LGSIL of cervix of undetermined significance  - Follow up post partum        Short interval between pregnancies affecting pregnancy, antepartum         History of postpartum hemorrhage, currently pregnant         Hx of preeclampsia, prior pregnancy, currently pregnant  - Contine  mg PO daily until 36 weeks for pre-eclampsia risk reduction       Breech presentation, single or unspecified fetus  - US today in office with fetus vertex, plan for  then          Elevated BP without diagnosis of hypertension  - BP today normotensive           Subjective:   Steve Mahoney is a 25 y.o.  who presents for routine prenatal care at 37w5d.  Complaints today: reports L sided sciatica pain      LOF: no; VB: no; Contractions: no; FM: yes     Objective:  /76 (BP Location: Right arm, Patient Position:  "Sitting, Cuff Size: Standard)   Ht 5' 3\" (1.6 m)   Wt 88.9 kg (196 lb)   LMP 2024 (Exact Date)   BMI 34.72 kg/m²     General: Well appearing, no distress  Respiratory: Unlabored breathing  Cardiovascular: Regular rate.  Abdomen: Soft, gravid, nontender  Extremities: Warm and well perfused.  Non tender.    Pregravid Weight/BMI: 77.1 kg (170 lb) (BMI 30.12)  Current Weight: 88.9 kg (196 lb)   Total Weight Gain: 11.8 kg (26 lb)     Vertex position confirmed on US today    BPM on US     Pre- Vitals      Flowsheet Row Most Recent Value   Prenatal Assessment    Prenatal Vitals    Blood Pressure 118/76   Weight - Scale 88.9 kg (196 lb)   Urine Albumin/Glucose    Dilation/Effacement/Station    Vaginal Drainage    Edema            Roger Dover MD  3/19/2025 2:40 PM    "

## 2025-03-19 NOTE — ASSESSMENT & PLAN NOTE
- Labs up to date   - US 2/10  Breech presentation  Fetal growth appeared normal  Placenta Location = Posterior  MEASUREMENTS (* Included In Average GA)  AC             29.15 cm        33 weeks 1 day * (71%)  BPD             7.95 cm        31 weeks 6 days* (27%)  HC             30.59 cm        34 weeks 1 day * (57%)  Femur           6.30 cm        32 weeks 4 days* (42%)     HC/AC           1.05 [0.96 - 1.17]                 (44%)  FL/AC             22 [20 - 24]  FL/BPD            79 [71 - 87]  EFW Hadlock 4   2089 grams - 4 lbs 10 oz  (58%)  - Vaccinations: S/p Tdap    - GBS negative   - RTC for 38 week Ob visit     Regarding sciatica pain, recommended heat therapy as well as stretching, use of birthing ball. Can also trial belly binder. Patient will reach back out after trial of this at home.     Orders:    POCT urine dip

## 2025-03-26 ENCOUNTER — ROUTINE PRENATAL (OUTPATIENT)
Dept: OBGYN CLINIC | Facility: CLINIC | Age: 25
End: 2025-03-26
Payer: COMMERCIAL

## 2025-03-26 VITALS
WEIGHT: 198 LBS | HEIGHT: 63 IN | DIASTOLIC BLOOD PRESSURE: 78 MMHG | BODY MASS INDEX: 35.08 KG/M2 | SYSTOLIC BLOOD PRESSURE: 130 MMHG

## 2025-03-26 DIAGNOSIS — Z3A.38 38 WEEKS GESTATION OF PREGNANCY: ICD-10-CM

## 2025-03-26 DIAGNOSIS — O09.899 SHORT INTERVAL BETWEEN PREGNANCIES AFFECTING PREGNANCY, ANTEPARTUM: Primary | ICD-10-CM

## 2025-03-26 DIAGNOSIS — O99.419 MATERNAL CONGENITAL HEART DISEASE, ANTEPARTUM: ICD-10-CM

## 2025-03-26 DIAGNOSIS — Q24.9 MATERNAL CONGENITAL HEART DISEASE, ANTEPARTUM: ICD-10-CM

## 2025-03-26 DIAGNOSIS — O09.299 HX OF PREECLAMPSIA, PRIOR PREGNANCY, CURRENTLY PREGNANT: ICD-10-CM

## 2025-03-26 LAB
SL AMB  POCT GLUCOSE, UA: NORMAL
SL AMB POCT URINE PROTEIN: NORMAL

## 2025-03-26 PROCEDURE — PNV: Performed by: OBSTETRICS & GYNECOLOGY

## 2025-03-26 PROCEDURE — 81002 URINALYSIS NONAUTO W/O SCOPE: CPT | Performed by: OBSTETRICS & GYNECOLOGY

## 2025-03-26 NOTE — PROGRESS NOTES
"Routine Prenatal Visit  [facility] [address], [address], [place] [address]    Assessment/Plan:  Steve is a 25 y.o. year old  at 38w5d who presents for routine prenatal visit.     1. Short interval between pregnancies affecting pregnancy, antepartum  2. Hx of preeclampsia, prior pregnancy, currently pregnant  3. Maternal congenital heart disease, antepartum  4. 38 weeks gestation of pregnancy  Assessment & Plan:  Plans on expectant management at this time  Orders:  -     POCT urine dip        Subjective:     CC: Prenatal care    Steve Mahoney is a 25 y.o.  female who presents for routine prenatal care at 38w5d.  Pregnancy ROS: no leakage of fluid, pelvic pain, or vaginal bleeding.  normal fetal movement.    The following portions of the patient's history were reviewed and updated as appropriate: allergies, current medications, past family history, past medical history, obstetric history, gynecologic history, past social history, past surgical history and problem list.      Objective:  /78 (BP Location: Left arm, Patient Position: Sitting, Cuff Size: Standard)   Ht 5' 3\" (1.6 m)   Wt 89.8 kg (198 lb)   LMP 2024 (Exact Date)   BMI 35.07 kg/m²   Pregravid Weight/BMI: 77.1 kg (170 lb) (BMI 30.12)  Current Weight: 89.8 kg (198 lb)   Total Weight Gain: 12.7 kg (28 lb)   Pre-Apollo Vitals      Flowsheet Row Most Recent Value   Prenatal Assessment    Fetal Heart Rate 135   Fundal Height (cm) 38 cm   Movement Present   Presentation Vertex   Prenatal Vitals    Blood Pressure 130/78   Weight - Scale 89.8 kg (198 lb)   Urine Albumin/Glucose    Dilation/Effacement/Station    Vaginal Drainage    Edema              General: Well appearing, no distress  Respiratory: Unlabored breathing  Cardiovascular: Regular rate.  Abdomen: Soft, gravid, nontender  Fundal Height: Appropriate for gestational age.  Extremities: Warm and well perfused.  Non tender.  "

## 2025-04-02 ENCOUNTER — ROUTINE PRENATAL (OUTPATIENT)
Dept: OBGYN CLINIC | Facility: CLINIC | Age: 25
End: 2025-04-02
Payer: COMMERCIAL

## 2025-04-02 VITALS
WEIGHT: 199 LBS | SYSTOLIC BLOOD PRESSURE: 120 MMHG | BODY MASS INDEX: 35.26 KG/M2 | HEIGHT: 63 IN | DIASTOLIC BLOOD PRESSURE: 72 MMHG

## 2025-04-02 DIAGNOSIS — O09.299 HX OF PREECLAMPSIA, PRIOR PREGNANCY, CURRENTLY PREGNANT: ICD-10-CM

## 2025-04-02 DIAGNOSIS — Z3A.39 39 WEEKS GESTATION OF PREGNANCY: ICD-10-CM

## 2025-04-02 DIAGNOSIS — Q24.9 MATERNAL CONGENITAL HEART DISEASE, ANTEPARTUM: ICD-10-CM

## 2025-04-02 DIAGNOSIS — O09.899 SHORT INTERVAL BETWEEN PREGNANCIES AFFECTING PREGNANCY, ANTEPARTUM: Primary | ICD-10-CM

## 2025-04-02 DIAGNOSIS — O99.419 MATERNAL CONGENITAL HEART DISEASE, ANTEPARTUM: ICD-10-CM

## 2025-04-02 DIAGNOSIS — O09.299 HISTORY OF POSTPARTUM HEMORRHAGE, CURRENTLY PREGNANT: ICD-10-CM

## 2025-04-02 LAB
SL AMB  POCT GLUCOSE, UA: NORMAL
SL AMB POCT URINE PROTEIN: NORMAL

## 2025-04-02 PROCEDURE — 81002 URINALYSIS NONAUTO W/O SCOPE: CPT | Performed by: STUDENT IN AN ORGANIZED HEALTH CARE EDUCATION/TRAINING PROGRAM

## 2025-04-02 PROCEDURE — PNV: Performed by: STUDENT IN AN ORGANIZED HEALTH CARE EDUCATION/TRAINING PROGRAM

## 2025-04-02 NOTE — PROGRESS NOTES
"Routine Prenatal Visit  [facility] [place] [address], [address], [place] [address]  Assessment & Plan  Short interval between pregnancies affecting pregnancy, antepartum         Maternal congenital heart disease, antepartum  - Fetal echo normal       Hx of preeclampsia, prior pregnancy, currently pregnant         History of postpartum hemorrhage, currently pregnant         39 weeks gestation of pregnancy  - Labor/Bleeding/ROM precautions given. Kick counts reviewed.  - GBS negative result reviewed.   - Reviewed timing of delivery, including option for elective induction of labor as early as 39 weeks versus awaiting spontaneous onset of labor. Patient desires expectant management for another week.   - Problem list updated, results console reviewed and updated with pertinent prenatal labs.  - PMH, PSH, medications reviewed and updated as needed  - Return to office in 1 wk(s) for routine prenatal care    Orders:  •  POCT urine dip    Subjective:   Steve Mahoney is a 25 y.o.  who presents for routine prenatal care at 39w5d.  Complaints today: None  LOF: No; VB: No; Contractions: No; FM: Present and normal    Objective:  /72 (BP Location: Left arm, Patient Position: Sitting, Cuff Size: Standard)   Ht 5' 3\" (1.6 m)   Wt 90.3 kg (199 lb)   LMP 2024 (Exact Date)   BMI 35.25 kg/m²     General: Well appearing, no distress  Respiratory: Unlabored breathing  Cardiovascular: Regular rate.  Abdomen: Soft, gravid, nontender  Extremities: Warm and well perfused.  Non tender.    Pregravid Weight/BMI: 77.1 kg (170 lb) (BMI 30.12)  Current Weight: 90.3 kg (199 lb)   Total Weight Gain: 13.2 kg (29 lb)     Pre-Apollo Vitals    Flowsheet Row Most Recent Value   Prenatal Assessment    Fetal Heart Rate 130   Fundal Height (cm) 39 cm   Movement Present   Presentation Vertex   Prenatal Vitals    Blood Pressure 120/72   Weight - Scale 90.3 kg (199 lb)   Urine Albumin/Glucose  "   Dilation/Effacement/Station    Cervical Dilation 1.5   Cervical Effacement 0   Fetal Station -3   Vaginal Drainage    Draining Fluid No   Edema    LLE Edema None   RLE Edema None   Facial Edema None           Felice Arrington MD  4/2/2025 4:31 PM

## 2025-04-02 NOTE — ASSESSMENT & PLAN NOTE
- Fetal echo normal     
- Labor/Bleeding/ROM precautions given. Kick counts reviewed.  - GBS negative result reviewed.   - Reviewed timing of delivery, including option for elective induction of labor as early as 39 weeks versus awaiting spontaneous onset of labor. Patient desires expectant management for another week.   - Problem list updated, results console reviewed and updated with pertinent prenatal labs.  - PMH, PSH, medications reviewed and updated as needed  - Return to office in 1 wk(s) for routine prenatal care    Orders:  •  POCT urine dip  
English

## 2025-04-02 NOTE — LETTER
April 2, 2025     Patient: Steev Mahoney  YOB: 2000  Date of Visit: 4/2/2025      To Whom it May Concern:    Steve Mahoney is under my professional care. Steve was seen in my office on 4/2/2025. Now that Steve is approaching her estimated date of delivery, it is recommended that she be out of work from now until after completion of her postpartum leave period.    If you have any questions or concerns, please don't hesitate to call.         Sincerely,          Felice Arrington MD

## 2025-04-09 ENCOUNTER — ANESTHESIA EVENT (INPATIENT)
Dept: ANESTHESIOLOGY | Facility: HOSPITAL | Age: 25
End: 2025-04-09
Payer: COMMERCIAL

## 2025-04-09 ENCOUNTER — ANESTHESIA (INPATIENT)
Dept: ANESTHESIOLOGY | Facility: HOSPITAL | Age: 25
End: 2025-04-09
Payer: COMMERCIAL

## 2025-04-09 ENCOUNTER — HOSPITAL ENCOUNTER (INPATIENT)
Facility: HOSPITAL | Age: 25
LOS: 2 days | Discharge: HOME | End: 2025-04-11
Attending: STUDENT IN AN ORGANIZED HEALTH CARE EDUCATION/TRAINING PROGRAM | Admitting: STUDENT IN AN ORGANIZED HEALTH CARE EDUCATION/TRAINING PROGRAM
Payer: COMMERCIAL

## 2025-04-09 ENCOUNTER — ROUTINE PRENATAL (OUTPATIENT)
Dept: OBGYN CLINIC | Facility: CLINIC | Age: 25
End: 2025-04-09
Payer: COMMERCIAL

## 2025-04-09 VITALS — BODY MASS INDEX: 35.61 KG/M2 | SYSTOLIC BLOOD PRESSURE: 142 MMHG | DIASTOLIC BLOOD PRESSURE: 90 MMHG | WEIGHT: 201 LBS

## 2025-04-09 DIAGNOSIS — Z3A.40 40 WEEKS GESTATION OF PREGNANCY: ICD-10-CM

## 2025-04-09 DIAGNOSIS — Q24.9 MATERNAL CONGENITAL HEART DISEASE, ANTEPARTUM: ICD-10-CM

## 2025-04-09 DIAGNOSIS — R03.0 ELEVATED BP WITHOUT DIAGNOSIS OF HYPERTENSION: ICD-10-CM

## 2025-04-09 DIAGNOSIS — O09.299 HISTORY OF POSTPARTUM HEMORRHAGE, CURRENTLY PREGNANT: ICD-10-CM

## 2025-04-09 DIAGNOSIS — O99.419 MATERNAL CONGENITAL HEART DISEASE, ANTEPARTUM: ICD-10-CM

## 2025-04-09 DIAGNOSIS — O09.299 HX OF PREECLAMPSIA, PRIOR PREGNANCY, CURRENTLY PREGNANT: Primary | ICD-10-CM

## 2025-04-09 PROBLEM — E66.9 OBESITY (BMI 35.0-39.9 WITHOUT COMORBIDITY): Status: ACTIVE | Noted: 2025-04-09

## 2025-04-09 LAB
ABO GROUP BLD: NORMAL
ALBUMIN SERPL BCG-MCNC: 3.5 G/DL (ref 3.5–5)
ALP SERPL-CCNC: 156 U/L (ref 34–104)
ALT SERPL W P-5'-P-CCNC: 9 U/L (ref 7–52)
ANION GAP SERPL CALCULATED.3IONS-SCNC: 8 MMOL/L (ref 4–13)
AST SERPL W P-5'-P-CCNC: 16 U/L (ref 13–39)
BILIRUB SERPL-MCNC: 0.38 MG/DL (ref 0.2–1)
BLD GP AB SCN SERPL QL: NEGATIVE
BUN SERPL-MCNC: 6 MG/DL (ref 5–25)
CALCIUM SERPL-MCNC: 9.1 MG/DL (ref 8.4–10.2)
CHLORIDE SERPL-SCNC: 106 MMOL/L (ref 96–108)
CO2 SERPL-SCNC: 23 MMOL/L (ref 21–32)
CREAT SERPL-MCNC: 0.47 MG/DL (ref 0.6–1.3)
CREAT UR-MCNC: 31.4 MG/DL
ERYTHROCYTE [DISTWIDTH] IN BLOOD BY AUTOMATED COUNT: 16.9 % (ref 11.6–15.1)
GFR SERPL CREATININE-BSD FRML MDRD: 135 ML/MIN/1.73SQ M
GLUCOSE SERPL-MCNC: 81 MG/DL (ref 65–140)
HCT VFR BLD AUTO: 33.9 % (ref 34.8–46.1)
HGB BLD-MCNC: 10.8 G/DL (ref 11.5–15.4)
MCH RBC QN AUTO: 25.4 PG (ref 26.8–34.3)
MCHC RBC AUTO-ENTMCNC: 31.9 G/DL (ref 31.4–37.4)
MCV RBC AUTO: 80 FL (ref 82–98)
PLATELET # BLD AUTO: 202 THOUSANDS/UL (ref 149–390)
PMV BLD AUTO: 11.4 FL (ref 8.9–12.7)
POTASSIUM SERPL-SCNC: 4 MMOL/L (ref 3.5–5.3)
PROT SERPL-MCNC: 6.6 G/DL (ref 6.4–8.4)
PROT UR-MCNC: 9.5 MG/DL
PROT/CREAT UR: 0.3 MG/G{CREAT}
RBC # BLD AUTO: 4.25 MILLION/UL (ref 3.81–5.12)
RH BLD: POSITIVE
SL AMB  POCT GLUCOSE, UA: NORMAL
SL AMB POCT URINE PROTEIN: NORMAL
SODIUM SERPL-SCNC: 137 MMOL/L (ref 135–147)
SPECIMEN EXPIRATION DATE: NORMAL
WBC # BLD AUTO: 11.58 THOUSAND/UL (ref 4.31–10.16)

## 2025-04-09 PROCEDURE — 3E033VJ INTRODUCTION OF OTHER HORMONE INTO PERIPHERAL VEIN, PERCUTANEOUS APPROACH: ICD-10-PCS | Performed by: STUDENT IN AN ORGANIZED HEALTH CARE EDUCATION/TRAINING PROGRAM

## 2025-04-09 PROCEDURE — 86850 RBC ANTIBODY SCREEN: CPT | Performed by: STUDENT IN AN ORGANIZED HEALTH CARE EDUCATION/TRAINING PROGRAM

## 2025-04-09 PROCEDURE — 86901 BLOOD TYPING SEROLOGIC RH(D): CPT | Performed by: STUDENT IN AN ORGANIZED HEALTH CARE EDUCATION/TRAINING PROGRAM

## 2025-04-09 PROCEDURE — 80053 COMPREHEN METABOLIC PANEL: CPT | Performed by: STUDENT IN AN ORGANIZED HEALTH CARE EDUCATION/TRAINING PROGRAM

## 2025-04-09 PROCEDURE — 84156 ASSAY OF PROTEIN URINE: CPT | Performed by: STUDENT IN AN ORGANIZED HEALTH CARE EDUCATION/TRAINING PROGRAM

## 2025-04-09 PROCEDURE — 10907ZC DRAINAGE OF AMNIOTIC FLUID, THERAPEUTIC FROM PRODUCTS OF CONCEPTION, VIA NATURAL OR ARTIFICIAL OPENING: ICD-10-PCS | Performed by: STUDENT IN AN ORGANIZED HEALTH CARE EDUCATION/TRAINING PROGRAM

## 2025-04-09 PROCEDURE — 86900 BLOOD TYPING SEROLOGIC ABO: CPT | Performed by: STUDENT IN AN ORGANIZED HEALTH CARE EDUCATION/TRAINING PROGRAM

## 2025-04-09 PROCEDURE — 82570 ASSAY OF URINE CREATININE: CPT | Performed by: STUDENT IN AN ORGANIZED HEALTH CARE EDUCATION/TRAINING PROGRAM

## 2025-04-09 PROCEDURE — 81002 URINALYSIS NONAUTO W/O SCOPE: CPT | Performed by: OBSTETRICS & GYNECOLOGY

## 2025-04-09 PROCEDURE — 86780 TREPONEMA PALLIDUM: CPT | Performed by: STUDENT IN AN ORGANIZED HEALTH CARE EDUCATION/TRAINING PROGRAM

## 2025-04-09 PROCEDURE — 85027 COMPLETE CBC AUTOMATED: CPT | Performed by: STUDENT IN AN ORGANIZED HEALTH CARE EDUCATION/TRAINING PROGRAM

## 2025-04-09 PROCEDURE — 99213 OFFICE O/P EST LOW 20 MIN: CPT

## 2025-04-09 PROCEDURE — 4A1HXCZ MONITORING OF PRODUCTS OF CONCEPTION, CARDIAC RATE, EXTERNAL APPROACH: ICD-10-PCS | Performed by: STUDENT IN AN ORGANIZED HEALTH CARE EDUCATION/TRAINING PROGRAM

## 2025-04-09 PROCEDURE — NC001 PR NO CHARGE: Performed by: STUDENT IN AN ORGANIZED HEALTH CARE EDUCATION/TRAINING PROGRAM

## 2025-04-09 PROCEDURE — PNV: Performed by: OBSTETRICS & GYNECOLOGY

## 2025-04-09 RX ORDER — OXYTOCIN/RINGER'S LACTATE 30/500 ML
1-30 PLASTIC BAG, INJECTION (ML) INTRAVENOUS
Status: DISCONTINUED | OUTPATIENT
Start: 2025-04-09 | End: 2025-04-10

## 2025-04-09 RX ORDER — ROPIVACAINE HYDROCHLORIDE 2 MG/ML
INJECTION, SOLUTION EPIDURAL; INFILTRATION; PERINEURAL CONTINUOUS PRN
Status: DISCONTINUED | OUTPATIENT
Start: 2025-04-09 | End: 2025-04-10 | Stop reason: HOSPADM

## 2025-04-09 RX ORDER — SODIUM CHLORIDE, SODIUM LACTATE, POTASSIUM CHLORIDE, CALCIUM CHLORIDE 600; 310; 30; 20 MG/100ML; MG/100ML; MG/100ML; MG/100ML
125 INJECTION, SOLUTION INTRAVENOUS CONTINUOUS
Status: DISCONTINUED | OUTPATIENT
Start: 2025-04-09 | End: 2025-04-10

## 2025-04-09 RX ORDER — SENNOSIDES 8.6 MG
2 TABLET ORAL ONCE AS NEEDED
Status: DISCONTINUED | OUTPATIENT
Start: 2025-04-09 | End: 2025-04-11 | Stop reason: HOSPADM

## 2025-04-09 RX ORDER — FAMOTIDINE 20 MG/1
10 TABLET, FILM COATED ORAL 2 TIMES DAILY
Status: DISCONTINUED | OUTPATIENT
Start: 2025-04-09 | End: 2025-04-10

## 2025-04-09 RX ORDER — LIDOCAINE HYDROCHLORIDE AND EPINEPHRINE 15; 5 MG/ML; UG/ML
INJECTION, SOLUTION EPIDURAL AS NEEDED
Status: DISCONTINUED | OUTPATIENT
Start: 2025-04-09 | End: 2025-04-10 | Stop reason: HOSPADM

## 2025-04-09 RX ORDER — LIDOCAINE HYDROCHLORIDE 10 MG/ML
INJECTION, SOLUTION EPIDURAL; INFILTRATION; INTRACAUDAL; PERINEURAL AS NEEDED
Status: DISCONTINUED | OUTPATIENT
Start: 2025-04-09 | End: 2025-04-10 | Stop reason: HOSPADM

## 2025-04-09 RX ORDER — BUPIVACAINE HYDROCHLORIDE 2.5 MG/ML
30 INJECTION, SOLUTION EPIDURAL; INFILTRATION; INTRACAUDAL; PERINEURAL ONCE AS NEEDED
Status: DISCONTINUED | OUTPATIENT
Start: 2025-04-09 | End: 2025-04-10

## 2025-04-09 RX ORDER — POLYETHYLENE GLYCOL 3350 17 G/17G
17 POWDER, FOR SOLUTION ORAL DAILY PRN
Status: DISCONTINUED | OUTPATIENT
Start: 2025-04-09 | End: 2025-04-11 | Stop reason: HOSPADM

## 2025-04-09 RX ORDER — BUPIVACAINE HYDROCHLORIDE 2.5 MG/ML
INJECTION, SOLUTION EPIDURAL; INFILTRATION; INTRACAUDAL; PERINEURAL AS NEEDED
Status: DISCONTINUED | OUTPATIENT
Start: 2025-04-09 | End: 2025-04-10 | Stop reason: HOSPADM

## 2025-04-09 RX ADMIN — SODIUM CHLORIDE, SODIUM LACTATE, POTASSIUM CHLORIDE, AND CALCIUM CHLORIDE 125 ML/HR: .6; .31; .03; .02 INJECTION, SOLUTION INTRAVENOUS at 14:39

## 2025-04-09 RX ADMIN — BUPIVACAINE HYDROCHLORIDE 5 ML: 2.5 INJECTION, SOLUTION EPIDURAL; INFILTRATION; INTRACAUDAL; PERINEURAL at 21:49

## 2025-04-09 RX ADMIN — ROPIVACAINE HYDROCHLORIDE: 2 INJECTION, SOLUTION EPIDURAL; INFILTRATION at 21:59

## 2025-04-09 RX ADMIN — ROPIVACAINE HYDROCHLORIDE 12 ML/HR: 2 INJECTION, SOLUTION EPIDURAL; INFILTRATION at 21:57

## 2025-04-09 RX ADMIN — BUPIVACAINE HYDROCHLORIDE 3 ML: 2.5 INJECTION, SOLUTION EPIDURAL; INFILTRATION; INTRACAUDAL; PERINEURAL at 21:56

## 2025-04-09 RX ADMIN — POLYETHYLENE GLYCOL 3350 17 G: 17 POWDER, FOR SOLUTION ORAL at 19:30

## 2025-04-09 RX ADMIN — Medication 2 MILLI-UNITS/MIN: at 19:08

## 2025-04-09 RX ADMIN — SODIUM CHLORIDE, SODIUM LACTATE, POTASSIUM CHLORIDE, AND CALCIUM CHLORIDE 125 ML/HR: .6; .31; .03; .02 INJECTION, SOLUTION INTRAVENOUS at 19:44

## 2025-04-09 RX ADMIN — LIDOCAINE HYDROCHLORIDE,EPINEPHRINE BITARTRATE 3 ML: 15; .005 INJECTION, SOLUTION EPIDURAL; INFILTRATION; INTRACAUDAL; PERINEURAL at 21:51

## 2025-04-09 RX ADMIN — FAMOTIDINE 10 MG: 20 TABLET, FILM COATED ORAL at 14:41

## 2025-04-09 RX ADMIN — LIDOCAINE HYDROCHLORIDE 3 ML: 10 INJECTION, SOLUTION EPIDURAL; INFILTRATION; INTRACAUDAL; PERINEURAL at 21:45

## 2025-04-09 RX ADMIN — FAMOTIDINE 10 MG: 20 TABLET, FILM COATED ORAL at 18:36

## 2025-04-09 NOTE — ASSESSMENT & PLAN NOTE
BP borderline elevated x 2 today. 142/90 and 140/82. Neg urine protein. Asymptomatic. Will send to L+D for evaluation. If she does not meet criteria for Ghtn then she still would like labor induction in the next few days as she is nearly 41 week.

## 2025-04-09 NOTE — PROGRESS NOTES
Routine Prenatal Visit  [facility] [address], [address], [place] [address]    Assessment/Plan:  Steve is a 25 y.o. year old  at 40w5d who presents for routine prenatal visit.     1. Hx of preeclampsia, prior pregnancy, currently pregnant  Assessment & Plan:  BP borderline elevated x 2 today. 142/90 and 140/82. Neg urine protein. Asymptomatic. Will send to L+D for evaluation. If she does not meet criteria for Ghtn then she still would like labor induction in the next few days as she is nearly 41 week.  2. Elevated BP without diagnosis of hypertension  3. History of postpartum hemorrhage, currently pregnant  4. Maternal congenital heart disease, antepartum  5. 40 weeks gestation of pregnancy  -     POCT urine dip        Subjective:     CC: Prenatal care    Steve Mahoney is a 25 y.o.  female who presents for routine prenatal care at 40w5d.  Pregnancy ROS: no leakage of fluid, pelvic pain, or vaginal bleeding.  normal fetal movement.    The following portions of the patient's history were reviewed and updated as appropriate: allergies, current medications, past family history, past medical history, obstetric history, gynecologic history, past social history, past surgical history and problem list.      Objective:  /90 (BP Location: Left arm, Patient Position: Sitting, Cuff Size: Standard)   Wt 91.2 kg (201 lb)   LMP 2024 (Exact Date)   BMI 35.61 kg/m²   Pregravid Weight/BMI: 77.1 kg (170 lb) (BMI 30.12)  Current Weight: 91.2 kg (201 lb)   Total Weight Gain: 14.1 kg (31 lb)   Pre-Apollo Vitals      Flowsheet Row Most Recent Value   Prenatal Assessment    Fetal Heart Rate 135   Fundal Height (cm) 41 cm   Movement Present   Presentation Vertex   Prenatal Vitals    Blood Pressure 142/90   Weight - Scale 91.2 kg (201 lb)   Urine Albumin/Glucose    Dilation/Effacement/Station    Cervical Dilation 1.5   Cervical Effacement 50   Fetal Station -3   Vaginal Drainage     Edema              General: Well appearing, no distress  Respiratory: Unlabored breathing  Cardiovascular: Regular rate.  Abdomen: Soft, gravid, nontender  Fundal Height: Appropriate for gestational age.  Extremities: Warm and well perfused.  Non tender.

## 2025-04-09 NOTE — OB LABOR/OXYTOCIN SAFETY PROGRESS
Labor Progress Note - Steve Mahoney 25 y.o. female MRN: [id number]    Unit/Bed#: -01 Encounter: [id number]       Contraction Frequency (minutes): 2-3  Contraction Intensity: Mild  Uterine Activity Characteristics: Irregular  Cervical Dilation: 4-5        Cervical Effacement: 60  Fetal Station: -2  Baseline Rate (FHR): 135 bpm  Fetal Heart Rate (FHT): 143 BPM  FHR Category: 135-140           Vital Signs:   Vitals:    25 1843   BP: 125/73   Pulse: 78   Resp:    Temp:      Notes/comments:       25 y.o.  at 40w5d admitted for IOL due to elevated BP in the setting of term gestation.      - Labor: S/p FB. SVE 4.5/60/-2. Will initiate pitocin for augmentation and titrate per protocol. Next SVE in 3-4 hours or PRN for maternal/fetal indications   - FHT Cat I   - GBS negative   - Pain: Epidural PRN  - Elevated BP, hx Pre E in prior pregnancy: In office BP mild range, repeat here normotensive. PIH labs drawn and unremarkable. P/C elevated to 0.30. She remains asymptomatic. Continue  to monitor closely   - Short interval pregnancy: Can discuss contraception in postpartum period   - Hx VSD: S/p closure. Normal fetal echocardiogram  - Hx LSIL: Needs re-evaluation postpartum period   - Anemia: Hb on admission 10.8   - Obesity: BMI 35, SCD's ordered       Roger Dover MD 2025 6:48 PM

## 2025-04-09 NOTE — H&P
History & Physical - OB/GYN   Steve Mahoney 25 y.o. female MRN: [id number]  Unit/Bed#: -01 Encounter: [id number]    Assessment:  25 y.o.  at 40w5d admitted for IOL due to elevated BP in the setting of term gestation. She presents from the office.     Plan:  1. Admit to L&D  2. Place IV, initiate IV fluids  3. Labs: CBC, RPR, type and screen  4. Labor management: cervical ripening with wilder balloon if cervix favorable on re-examination after patient eats and is ready to start her IOL  5. Analgesia/Epidural PRN    - Elevated BP, hx Pre E in prior pregnancy: In office BP mild range, repeat here normotensive. PIH labs drawn and unremarkable. P/C elevated to 0.30. Discussed IOL due to elevated BP without diagnosis yet and patient amenable. She remains asymptomatic. Continue  to monitor closely   - Short interval pregnancy: Can discuss contraception in postpartum period   - Hx VSD: S/p closure. 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    HPI: Steve Mahoney is 25 y.o.  at 40w5d presenting for evaluation of elevated BP in the office. She denies PIH symptoms.     Contractions:  yes  Fetal movement:  yes  Vaginal bleeding:   no  Leaking of fluid:  no    Pregnancy Complications:  Pregnancy Problems (from 24 to present)       Problem Noted Diagnosed Resolved    40 weeks gestation of pregnancy 10/1/2024 by Roger Dover MD  No    Overview Addendum 2025 10:17 AM by Bouchra DUENAS DO   TDAP @ 25         Hx of preeclampsia, prior pregnancy, currently pregnant 10/1/2024 by Roger Dover MD  No    Overview Signed 10/1/2024  5:07 PM by Roger Dover MD   Baseline PIH labs unremarkable            History of postpartum hemorrhage, currently pregnant 2024 by  Karly Broussard MD  No    Overview Signed 10/1/2024  5:03 PM by Roger Dover MD   Hx PPH and transfusion with last delivery          Short interval between pregnancies affecting pregnancy, antepartum 2024 by Karly Broussard MD  No    Overview Signed 10/1/2024  5:02 PM by Roger Dover MD   Third trimester growth assessments are advised per [facility]          Maternal congenital heart disease, antepartum 2023 by Juan José Moore MD  No    Overview Addendum 2025  3:40 PM by Felice Arrington MD   Hx maternal VSD, spontaneously closed.    pregnancy: Fetal echocardiogram normal         Breech presentation 2/10/2025 by Andry Carter MD  2025 by Felice Arrington MD    Overview Signed 3/19/2025  2:40 PM by Roger Dover MD   Vertex in office on 3/19/25                PMH:  Past Medical History:   Diagnosis Date    Abnormal Pap smear of cervix     LGSIL on pap 3/2023    Allergic     History of transfusion     iron infusion post delivery    Hypertension     pre-eclampsia last preg - Procardia    Impacted teeth     Resolved 10/12/2017     Maternal anemia with delivery, with current postpartum complication 2023    Admission Hgb 10.5. 3 hours PP 8.2 and PPD#1 5.9. 2 UPRBC given      Varicella     vaccinated    Ventricular septal defect      PSH:  Past Surgical History:   Procedure Laterality Date    WISDOM TOOTH EXTRACTION       Social Hx:  Social History     Tobacco Use    Smoking status: Never     Passive exposure: Never    Smokeless tobacco: Never   Vaping Use    Vaping status: Never Used   Substance Use Topics    Alcohol use: Not Currently    Drug use: Not Currently      OB Hx:  OB History    Para Term  AB Living   2 1 1 0 0 1   SAB IAB Ectopic Multiple Live Births   0 0 0 0 1      # Outcome Date GA Lbr Jacob/2nd Weight Sex Type Anes PTL Lv   2 Current            1 Term 11/10/23 37w2d / 00:52 2910 g (6 lb 6.7 oz) M Vag-Spont EPI N MARILYN      Birth Comments: small laceration on scalp       "Name: ABHIJEET,BABY BOY (EDDIE)      Apgar1: 9  Apgar5: 9     Meds:  No current facility-administered medications on file prior to encounter.     Current Outpatient Medications on File Prior to Encounter   Medication Sig Dispense Refill    calcium carbonate (TUMS) 500 mg chewable tablet Chew 1 tablet daily      docusate sodium (COLACE) 100 mg capsule Take 100 mg by mouth daily      famotidine (PEPCID) 10 mg tablet Take 10 mg by mouth 2 (two) times a day      Prenatal Vit-Fe Fumarate-FA (PRENATAL VITAMINS PO) Take by mouth       Allergies:  Allergies   Allergen Reactions    Azithromycin Rash     Labs:  ABO Grouping   Date Value Ref Range Status   04/09/2025 O  Final      Rh Factor   Date Value Ref Range Status   04/09/2025 Positive  Final     Rh Type   Date Value Ref Range Status   09/19/2024 Positive  Final     Comment:     Please note: Prior records for this patient's ABO / Rh type are not  available for additional verification.        Rh Factor   Date Value Ref Range Status   04/09/2025 Positive  Final     Rh Type   Date Value Ref Range Status   09/19/2024 Positive  Final     Comment:     Please note: Prior records for this patient's ABO / Rh type are not  available for additional verification.       HIV-1/HIV-2 AB   Date Value Ref Range Status   05/01/2023 Non-Reactive  Final     Hepatitis B Surface Ag   Date Value Ref Range Status   09/19/2024 negative  Final     HEP C AB   Date Value Ref Range Status   09/19/2024 Non Reactive Non Reactive Final     RPR   Date Value Ref Range Status   01/11/2025 Non Reactive Non Reactive Final     No results found for: \"RUBELLAIGGQT\"  Glucose   Date Value Ref Range Status   01/11/2025 97 70 - 139 mg/dL Final     Comment:     According to ADA, a glucose threshold of >139 mg/dL after 50-gram  load identifies approximately 80% of women with gestational  diabetes mellitus, while the sensitivity is further increased to  approximately 90% by a threshold of >129 mg/dL.       Glucose 3 " "Hour   Date Value Ref Range Status   09/15/2023 121 70 - 139 mg/dL Final     Strep Grp B REGI   Date Value Ref Range Status   2025 Negative Negative Final     Comment:     Centers for Disease Control and Prevention (CDC) and American Congress  of Obstetricians and Gynecologists (ACOG) guidelines for prevention of   group B streptococcal (GBS) disease specify co-collection of  a vaginal and rectal swab specimen to maximize sensitivity of GBS  detection. Per the CDC and ACOG, swabbing both the lower vagina and  rectum substantially increases the yield of detection compared with  sampling the vagina alone.  Penicillin G, ampicillin, or cefazolin are indicated for intrapartum  prophylaxis of  GBS colonization. Reflex susceptibility  testing should be performed prior to use of clindamycin only on GBS  isolates from penicillin-allergic women who are considered a high risk  for anaphylaxis. Treatment with vancomycin without additional testing  is warranted if resistance to clindamycin is noted.       Physical Exam:  /78 (BP Location: Right arm)   Pulse 89   Temp 97.7 °F (36.5 °C)   Resp 16   Ht 5' 3\" (1.6 m)   Wt 91.2 kg (201 lb)   LMP 2024 (Exact Date)   BMI 35.61 kg/m²     Gen: alert, no acute distress  Cardiac: regular rate  Resp: unlabored breathing  Abdomen: gravid, non-tender, non-distended  Extremities: non-tender, Minimal edema    Estimated Fetal Weight: 3700 grams   Presentation: cephalic on US     SVE: Checked in office, 1-1.5 cm; plan to repeat after patient has eaten lunch when we begin her IOL    Pelvis previously assessed and felt to be adequate     FHT:  Baseline Rate (FHR): 145 bpm  Variability: Moderate  Accelerations: 15 x 15 or greater  Decelerations: None  TOCO:   Contraction Frequency (minutes): n/a    Membranes: intact     Roger Dover MD  2025 1:01 PM     "

## 2025-04-09 NOTE — PLAN OF CARE
Problem: Knowledge Deficit  Goal: Verbalizes understanding of labor plan  Description: Assess patient/family/caregiver's baseline knowledge level and ability to understand information.  Provide education via patient/family/caregiver's preferred learning method at appropriate level of understanding. 1. Provide teaching at level of understanding.2. Provide teaching via preferred learning method(s).  4/9/2025 1923 by uYe Davidson RN  Outcome: Progressing  4/9/2025 1221 by Yue Davidson RN  Outcome: Progressing

## 2025-04-09 NOTE — OB LABOR/OXYTOCIN SAFETY PROGRESS
Labor Progress Note - Steve Mahoney 25 y.o. female MRN: [id number]    Unit/Bed#: -01 Encounter: [id number]       Contraction Frequency (minutes): n/a        Cervical Dilation: 1        Cervical Effacement: 50  Fetal Station: -3  Baseline Rate (FHR): 145 bpm  Fetal Heart Rate (FHT): 143 BPM  FHR Category: I            Vital Signs:   Vitals:    25 1500   BP:    Pulse:    Resp:    Temp: 98.3 °F (36.8 °C)     Notes/comments:     25 y.o.  at 40w5d admitted for IOL due to elevated BP in the setting of term gestation.     - Labor: SVE checked and /-3. Cervical ripening initiated with placement of wilder balloon. Will recheck SVE after balloon expulsion or PRN for maternal/fetal indications   - FHT Cat I   - GBS negative   - Pain: Epidural PRN  - Elevated BP, hx Pre E in prior pregnancy: In office BP mild range, repeat here normotensive. PIH labs drawn and unremarkable. P/C elevated to 0.30. She remains asymptomatic. Continue  to monitor closely   - Short interval pregnancy: Can discuss contraception in postpartum period   - Hx VSD: S/p closure. 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

## 2025-04-10 LAB
BASE EXCESS BLDCOA CALC-SCNC: -5.7 MMOL/L (ref 3–11)
BASE EXCESS BLDCOV CALC-SCNC: -1.7 MMOL/L (ref 1–9)
HCO3 BLDCOA-SCNC: 21.8 MMOL/L (ref 17.3–27.3)
HCO3 BLDCOV-SCNC: 21.9 MMOL/L (ref 12.2–28.6)
O2 CT VFR BLDCOA CALC: 12.8 ML/DL
OXYHGB MFR BLDCOA: 51 %
OXYHGB MFR BLDCOV: 79 %
PCO2 BLDCOA: 49.8 MM[HG] (ref 30–60)
PCO2 BLDCOV: 34.7 MM HG (ref 27–43)
PH BLDCOA: 7.26 [PH] (ref 7.23–7.43)
PH BLDCOV: 7.42 [PH] (ref 7.19–7.49)
PO2 BLDCOA: 24.8 MM HG (ref 5–25)
PO2 BLDCOV: 36.3 MM HG (ref 15–45)
SAO2 % BLDCOV: 20.8 ML/DL
TREPONEMA PALLIDUM IGG+IGM AB [PRESENCE] IN SERUM OR PLASMA BY IMMUNOASSAY: NORMAL

## 2025-04-10 PROCEDURE — 3E0P7VZ INTRODUCTION OF HORMONE INTO FEMALE REPRODUCTIVE, VIA NATURAL OR ARTIFICIAL OPENING: ICD-10-PCS | Performed by: STUDENT IN AN ORGANIZED HEALTH CARE EDUCATION/TRAINING PROGRAM

## 2025-04-10 PROCEDURE — 82805 BLOOD GASES W/O2 SATURATION: CPT | Performed by: STUDENT IN AN ORGANIZED HEALTH CARE EDUCATION/TRAINING PROGRAM

## 2025-04-10 PROCEDURE — 0KQM0ZZ REPAIR PERINEUM MUSCLE, OPEN APPROACH: ICD-10-PCS | Performed by: STUDENT IN AN ORGANIZED HEALTH CARE EDUCATION/TRAINING PROGRAM

## 2025-04-10 PROCEDURE — 59400 OBSTETRICAL CARE: CPT | Performed by: STUDENT IN AN ORGANIZED HEALTH CARE EDUCATION/TRAINING PROGRAM

## 2025-04-10 RX ORDER — CALCIUM CARBONATE 500 MG/1
1000 TABLET, CHEWABLE ORAL DAILY PRN
Status: DISCONTINUED | OUTPATIENT
Start: 2025-04-10 | End: 2025-04-11 | Stop reason: HOSPADM

## 2025-04-10 RX ORDER — CARBOPROST TROMETHAMINE 250 UG/ML
INJECTION, SOLUTION INTRAMUSCULAR
Status: DISPENSED
Start: 2025-04-10 | End: 2025-04-10

## 2025-04-10 RX ORDER — MISOPROSTOL 200 UG/1
1000 TABLET ORAL ONCE
Status: COMPLETED | OUTPATIENT
Start: 2025-04-10 | End: 2025-04-10

## 2025-04-10 RX ORDER — OXYTOCIN/RINGER'S LACTATE 30/500 ML
62.5 PLASTIC BAG, INJECTION (ML) INTRAVENOUS CONTINUOUS
Status: ACTIVE | OUTPATIENT
Start: 2025-04-10 | End: 2025-04-10

## 2025-04-10 RX ORDER — TRANEXAMIC ACID 10 MG/ML
1000 INJECTION, SOLUTION INTRAVENOUS ONCE
Status: COMPLETED | OUTPATIENT
Start: 2025-04-10 | End: 2025-04-10

## 2025-04-10 RX ORDER — ONDANSETRON 2 MG/ML
4 INJECTION INTRAMUSCULAR; INTRAVENOUS EVERY 8 HOURS PRN
Status: DISCONTINUED | OUTPATIENT
Start: 2025-04-10 | End: 2025-04-11 | Stop reason: HOSPADM

## 2025-04-10 RX ORDER — CARBOPROST TROMETHAMINE 250 UG/ML
250 INJECTION, SOLUTION INTRAMUSCULAR ONCE
Status: COMPLETED | OUTPATIENT
Start: 2025-04-10 | End: 2025-04-10

## 2025-04-10 RX ORDER — OXYTOCIN/RINGER'S LACTATE 30/500 ML
250 PLASTIC BAG, INJECTION (ML) INTRAVENOUS CONTINUOUS
Status: DISPENSED | OUTPATIENT
Start: 2025-04-10 | End: 2025-04-10

## 2025-04-10 RX ORDER — BENZOCAINE/MENTHOL 6 MG-10 MG
1 LOZENGE MUCOUS MEMBRANE DAILY PRN
Status: DISCONTINUED | OUTPATIENT
Start: 2025-04-10 | End: 2025-04-11 | Stop reason: HOSPADM

## 2025-04-10 RX ORDER — IBUPROFEN 600 MG/1
600 TABLET, FILM COATED ORAL EVERY 6 HOURS
Status: DISCONTINUED | OUTPATIENT
Start: 2025-04-10 | End: 2025-04-11 | Stop reason: HOSPADM

## 2025-04-10 RX ORDER — OXYTOCIN/RINGER'S LACTATE 30/500 ML
250 PLASTIC BAG, INJECTION (ML) INTRAVENOUS CONTINUOUS
Status: DISCONTINUED | OUTPATIENT
Start: 2025-04-10 | End: 2025-04-10

## 2025-04-10 RX ORDER — LOPERAMIDE HYDROCHLORIDE 2 MG/1
2 CAPSULE ORAL 4 TIMES DAILY PRN
Status: DISCONTINUED | OUTPATIENT
Start: 2025-04-10 | End: 2025-04-11 | Stop reason: HOSPADM

## 2025-04-10 RX ORDER — ACETAMINOPHEN 325 MG/1
650 TABLET ORAL EVERY 4 HOURS PRN
Status: DISCONTINUED | OUTPATIENT
Start: 2025-04-10 | End: 2025-04-11 | Stop reason: HOSPADM

## 2025-04-10 RX ORDER — OXYCODONE HYDROCHLORIDE 5 MG/1
5 TABLET ORAL
Refills: 0 | Status: DISCONTINUED | OUTPATIENT
Start: 2025-04-10 | End: 2025-04-11 | Stop reason: HOSPADM

## 2025-04-10 RX ORDER — OXYTOCIN/RINGER'S LACTATE 30/500 ML
PLASTIC BAG, INJECTION (ML) INTRAVENOUS
Status: COMPLETED
Start: 2025-04-10 | End: 2025-04-10

## 2025-04-10 RX ORDER — METHYLERGONOVINE MALEATE 0.2 MG/ML
INJECTION INTRAVENOUS
Status: DISPENSED
Start: 2025-04-10 | End: 2025-04-10

## 2025-04-10 RX ORDER — DIPHENHYDRAMINE HCL 25 MG
25 TABLET ORAL EVERY 6 HOURS PRN
Status: DISCONTINUED | OUTPATIENT
Start: 2025-04-10 | End: 2025-04-11 | Stop reason: HOSPADM

## 2025-04-10 RX ADMIN — MISOPROSTOL 1000 MCG: 200 TABLET ORAL at 02:33

## 2025-04-10 RX ADMIN — Medication 250 MILLI-UNITS/MIN: at 04:34

## 2025-04-10 RX ADMIN — CARBOPROST TROMETHAMINE 250 MCG: 250 INJECTION, SOLUTION INTRAMUSCULAR at 04:34

## 2025-04-10 RX ADMIN — TRANEXAMIC ACID 1000 MG: 10 INJECTION, SOLUTION INTRAVENOUS at 04:31

## 2025-04-10 RX ADMIN — IBUPROFEN 600 MG: 600 TABLET ORAL at 11:28

## 2025-04-10 RX ADMIN — IBUPROFEN 600 MG: 600 TABLET ORAL at 21:24

## 2025-04-10 RX ADMIN — IBUPROFEN 600 MG: 600 TABLET ORAL at 03:01

## 2025-04-10 RX ADMIN — Medication 62.5 MILLI-UNITS/MIN: at 07:08

## 2025-04-10 RX ADMIN — LOPERAMIDE HYDROCHLORIDE 2 MG: 2 CAPSULE ORAL at 06:26

## 2025-04-10 RX ADMIN — BENZOCAINE AND LEVOMENTHOL 1 APPLICATION: 200; 5 SPRAY TOPICAL at 06:27

## 2025-04-10 NOTE — ANESTHESIA PROCEDURE NOTES
Epidural Block    Patient location during procedure: OB/L&D  Start time: 4/9/2025 9:50 PM  Reason for block: procedure for pain and at surgeon's request  Staffing  Performed by: Juma Valenzuela DO  Authorized by: Juma Valenzuela DO    Preanesthetic Checklist  Completed: patient identified, IV checked, site marked, risks and benefits discussed, surgical consent, monitors and equipment checked, pre-op evaluation and timeout performed  Epidural  Patient position: sitting  Prep: ChloraPrep and site prepped and draped  Sedation Level: no sedation  Patient monitoring: continuous pulse oximetry, frequent blood pressure checks and heart rate  Approach: midline  Location: lumbar, L3-4  Injection technique: JEREMIE air  Needle  Needle type: Tuohy   Needle gauge: 17 G  Needle insertion depth: 4.5 cm  Catheter type: spring wound  Catheter size: 19 G  Catheter at skin depth: 9.5 cm  Catheter securement method: clear occlusive dressing, stabilization device and tape  Test dose: negative  Assessment  Sensory level: T4  Number of attempts: 1negative aspiration for CSF, negative aspiration for heme and no paresthesia on injection  patient tolerated the procedure well with no immediate complications

## 2025-04-10 NOTE — OB LABOR/OXYTOCIN SAFETY PROGRESS
Labor Progress Note - Steve Mahoney 25 y.o. female MRN: [id number]    Unit/Bed#: -01 Encounter: [id number]    Dose (jimmie-units/min) Oxytocin: 5 jimmie-units/min  Contraction Frequency (minutes): 1-2  Contraction Intensity: Moderate  Uterine Activity Characteristics: Regular  Cervical Dilation: 9        Cervical Effacement: 90  Fetal Station: 0  Baseline Rate (FHR): 120 bpm  Fetal Heart Rate (FHT): 143 BPM  FHR Category: 130's baseline; I-II, overall re-assuring given moderate variability and accelerations, will reposition with peanut ball in place and continue to monitor closely as active stage of labor            Vital Signs:   Vitals:    04/10/25 0001   BP: 120/63   Pulse:    Resp:    Temp:    SpO2:      Notes/comments:       25 y.o.  at 40w6d admitted for IOL due to elevated BP in the setting of term gestation.      - Labor: S/p FB. On pitocin, currently 5mU/min, titrate per protocol. AROM with clear fluid at 2116. Next SVE in 30-45 min as anterior lip. Anticipate    - FHT Cat I, with period of cat II, improved with repositioning, overall re-assuring   - GBS negative   - Pain: Epidural in place, comfortable   - Elevated BP, hx Pre E in prior pregnancy: In office BP mild range, repeat here normotensive. PIH labs drawn and unremarkable. P/C elevated to 0.30. She remains asymptomatic. Continue  to monitor closely   - Short interval pregnancy: Can discuss contraception in postpartum period   - Hx VSD: S/p closure. 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

## 2025-04-10 NOTE — OB LABOR/OXYTOCIN SAFETY PROGRESS
Labor Progress Note - Steve Mahoney 25 y.o. female MRN: [id number]    Unit/Bed#: -01 Encounter: [id number]    Dose (jimmie-units/min) Oxytocin: 3 jimmie-units/min  Contraction Frequency (minutes): 1.5-2  Contraction Intensity: Mild  Uterine Activity Characteristics: Regular  Cervical Dilation: 4-5        Cervical Effacement: 60  Fetal Station: -2  Baseline Rate (FHR): 145 bpm  Fetal Heart Rate (FHT): 143 BPM  FHR Category: 135-140           Vital Signs:   Vitals:    25   BP: 119/70   Pulse:    Resp:    Temp:      Notes/comments:       25 y.o.  at 40w5d admitted for IOL due to elevated BP in the setting of term gestation.      - Labor: S/p FB. On pitocin, currently 3mU/min, titrate per protocol. AROM with clear fluid on this examination.  Next SVE in 3-4 hours or PRN for maternal/fetal indications   - FHT Cat I   - GBS negative   - Pain: Epidural PRN  - Elevated BP, hx Pre E in prior pregnancy: In office BP mild range, repeat here normotensive. PIH labs drawn and unremarkable. P/C elevated to 0.30. She remains asymptomatic. Continue  to monitor closely   - Short interval pregnancy: Can discuss contraception in postpartum period   - Hx VSD: S/p closure. 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

## 2025-04-10 NOTE — L&D DELIVERY NOTE
DELIVERY NOTE  Steve Mahoney 25 y.o. female MRN: [id number]  Unit/Bed#: -01 Encounter: [id number]    Obstetrician: Roger Dover MD    Pre-Delivery Diagnosis:   - Baltazar pregnancy in vertex presentation at 40w6d gestation  - GBS negative   - Elevated BP without diagnosis of hypertensive disease   - Short interval pregnancy   - Maternal hx VSD  - Hx LSIL  - Anemia   - Obesity, BMI 35     Post-Delivery Diagnosis: Same, delivered    Procedure: Spontaneous vaginal delivery    Delivery Date and Time: 4/10/2025 at 2:13 AM     Umbilical Artery  Recent Labs     04/10/25  0217   PHCART 7.260   BECART -5.7*     Umbilical Vein  Recent Labs     04/10/25  0217   PHCVEN 7.418   BECVEN -1.7*     Apgars: 7 at 1 minute, 9 at 5 minutes    Weight: Pending           Complications: None    Description of Procedure:    Patient was admitted after elevated BP's noted at her routine prenatal visit. Given term gestation we discussed proceeding with IOL to which she was amenable. She received a cervical wilder balloon for ripening. After balloon expulsion she was initiated on pitocin for augmentation of labor. On re-examination, minimal cervical change was noted. An amniotomy was performed with leakage of clear fluid. After which she desired and received an epidural for pain management. She continued to make cervical change. On her most recent examination she was noted to be fully dilated and at 0 station.     She pushed for 20 minutes to spontaneously deliver a liveborn infant in ISIDORO position through clear fluid at 2:13 AM. 2 loose loops of the cord were noted, one loop around the neck and the other loop around the trunk. The head and shoulders delivered easily through the loops, with the body easily delivering thereafter. After delivery the cord was untangled and the infant was placed on maternal abdomen. Cord clamping was delayed for 60 seconds, after which the cord was doubly clamped and cut. Routine cord gases and cord blood  were collected. Pitocin was started for active management of the 3rd stage. Placenta delivered spontaneously and was noted to have a centrally-inserted 3-vessel cord. The placenta was sent to storage. Bimanual exam was performed, and the fundus was noted to be firm. A thorough pelvic exam revealed a 2nd degree perineal laceration, which was repaired with 2-0 polyglactin suture in typical fashion. Excellent hemostasis was noted, with total QBL of 488 mL. Given patient's prior history of postpartum hemorrhage and transfusion 1000 mcg of rectal Cytotec were placed.     All needle, sponge, and instrument counts were noted to be correct. The patient tolerated the procedure well and was allowed to recover in labor and delivery room.     Roger Dover MD  4/10/2025 3:00 AM

## 2025-04-10 NOTE — LACTATION NOTE
This note was copied from a baby's chart.  CONSULT - LACTATION  Baby Girl Mahoney (Jamie) 0 days female MRN: [id number]    [facility] NURSERY Room / Bed: (N)/(N) Encounter: [id number]    Maternal Information     MOTHER:  Steve Mahoney  Maternal Age: 25 y.o.  OB History: # 1 - Date: 11/10/23, Sex: Male, Weight: 2910 g (6 lb 6.7 oz), GA: 37w2d, Type: Vaginal, Spontaneous, Apgar1: 9, Apgar5: 9, Living: Living, Birth Comments: small laceration on scalp    # 2 - Date: 04/10/25, Sex: Female, Weight: 3830 g (8 lb 7.1 oz), GA: 40w6d, Type: Vaginal, Spontaneous, Apgar1: 7, Apgar5: 9, Living: Living, Birth Comments: None   Previous breast reduction surgery? No    Lactation history:   Has patient previously breast fed: Yes   How long had patient previously breast fed: about 8 months   Previous breast feeding complications: Exclusive pump and bottle fed, Other (Comment) (receeded chin & difficulty latching)     Past Surgical History:   Procedure Laterality Date    WISDOM TOOTH EXTRACTION         Birth information:  YOB: 2027   Time of birth: 2:13 AM   Sex: female   Delivery type: Vaginal, Spontaneous   Birth Weight: 3830 g (8 lb 7.1 oz)   Percent of Weight Change: 0%     Gestational Age: 40w6d   [unfilled]    Reason for Consult:    Reason for Consult: Initial assessment (ext) - 20 min, Latch Assess (ext) - 20 min, Discharge (routine) - 10 min    Risk Factors:         Breast and nipple assessment:   Breasts/Nipples  Left Breast: Soft (wide-spaced)  Right Breast: Soft  Left Nipple: Flat, Everted  Right Nipple: Flat, Everted  Intervention: Other (comment), Hand expression (helped with positioning/maintaining deep latch & support available)  Breastfeeding Status: Yes  Breastfeeding Progress: Not yet established    OB Lactation Tools:    Other OB Lactation Tools  Feeding Devices: Pump, Syringe    Breast Pump:    Breast Pump  Pump: Manual, Personal (Hand pump  @ this time; has Spectra S1 from Insurance)  Pump Review/Education: Setup, frequency, and cleaning, Milk storage  Initiated by: ROSA  Date Initiated: 04/10/25      [place] Assessment: receded chin    Feeding assessment: latch difficulty (Dyad had active suckling with assistance )    LATCH:  Latch: Repeated attempts, hold nipple in mouth, stimulate to suck (did better over time)   Audible Swallowing: A few with stimulation   Type of Nipple: Flat (short shaft, everted for short time after feed)   Comfort (Breast/Nipple): Soft/non-tender   Hold (Positioning): Partial assist, teach one side, mother does other, staff holds   LATCH Score: 6       [place]:                   Feeding recommendations:  breast feed on demand; Hand express OR pump if not latching well         04/10/25 1000   Patient Follow-Up   Lactation Consult Status 2   Follow-Up Type Inpatient;Call as needed   Other OB Lactation Documentation    Additional Problem Noted Initial Visit - Family with experience with pump & feed ; helped with positioning/maintaining deep latch  (Ready, Set Baby & Discharge Booklets @ bedside)     Parents wanted assistance with feeding at breast. Time to wake baby. Education on positioning and alignment. Mom is encouraged to:     - Bring baby up to the breast (use of pillows to elevate so baby's torso is against mom's breasts)   - Skin to skin for feedings with top hand exposed to show signs of satiation   - Chin deep into breast tissue (make baby look up to the nipple)   - nose aligned to the nipple   -Wait for wide gape, place chin behind the areola on the breast so nipple is at the nose. Once baby opens their mouth wide, the nipple will be aimed at the upper, back palate  - Cheeks should be touching breast, and baby should have a long neck   - Ear, shoulder, hip will be in alignment   - Deep, snug hold of baby up to the breast   - Every baby knows how to breathe at the breast. The tip of the nose may appear to touch the  breast. Babies breathe from the side of the nasal passages. If baby can not breathe due to improper alignment, baby will unlatch    Mom chose to start on right. Encouraged football hold for teaching. Hand expressed prior to each latch attempt.  Guided dyad to deep latch for short latches with piston suck till baby no longer attempting. Then to left for less effort after hand expression. Baby remains with hunger cues so; with permission back to right breast. Worked on positioning infant up at chest level and starting to feed infant with nose arriving at the nipple. Then, using areolar compression to achieve a deep latch that is comfortable and exchanges optimum amounts of milk.  Dyad guided to deep latch with longer latches and some active suckling, occasional audible swallowing & breast softening with relaxed tone. Father also shown signs of good latch /feed.     Also Ready, Set Baby & discharge breastfeeding booklets @ bedside  including the feeding log. Emphasized 8 or more (12) feedings in a 24 hour period, what to expect for the number of diapers per day of life and the progression of properties of the  stooling pattern.    List of reasons to call a lactation consultant.  Feeding logs  Feeding cues  Hand expression  Baby's Second day (cluster feeding)  Breastfeeding and Your Lifestyle (Medications, Alcohol, Caffeine, Smoking, Street Drugs, Methadone)  First Two Weeks Survival Guide for Breastfeeding  Breast Changes  Physical Therapy  Storage and Handling of Breast milk  How to Keep Your Breast Pump Kit Clean  The Employed Breastfeeding Mother  Mixed feeding  Bottle feeding like breastfeeding (paced bottle feeding)  astfeeding and your lifestyle, storage and preparation of breast milk, how to keep you breast pump clean, the employed breastfeeding mother and paced bottle feeding handouts.     Booklet included Breastfeeding Resources for after discharge including access to the number for the [facility]  Support Center.    Encouraged parents to call for assistance, questions, and concerns about breastfeeding.       Palak Esqueda RN 4/10/2025 11:42 AM

## 2025-04-10 NOTE — ANESTHESIA PREPROCEDURE EVALUATION
Procedure:  LABOR ANALGESIA    Relevant Problems   GYN   (+) 40 weeks gestation of pregnancy      Other   (+) Elevated BP without diagnosis of hypertension        Physical Exam    Airway    Mallampati score: II  TM Distance: >3 FB  Neck ROM: full     Dental   No notable dental hx     Cardiovascular      Pulmonary      Other Findings  post-pubertal.      Anesthesia Plan  ASA Score- 2     Anesthesia Type- epidural with ASA Monitors.         Additional Monitors:     Airway Plan:            Plan Factors-    Chart reviewed.   Existing labs reviewed. Patient summary reviewed.    Patient is not a current smoker.              Induction- intravenous.    Postoperative Plan-     Perioperative Resuscitation Plan - Level 1 - Full Code.       Informed Consent- Anesthetic plan and risks discussed with patient.        NPO Status:  No vitals data found for the desired time range.

## 2025-04-10 NOTE — QUICK NOTE
Pt with moderate post delivery bleed  QBL pending    Manual removal of clots by myself  Pitocin IV at 250 miu  Hemabate 250 mcg IM  TXA 1 gm  All being given    Vitals stable, no active bleeding after removal clots, but unable to reach fundus for removal    Will watch closely

## 2025-04-10 NOTE — PLAN OF CARE
Problem: POSTPARTUM  Goal: Experiences normal postpartum course  Description: INTERVENTIONS:- Monitor maternal vital signs- Assess uterine involution and lochia  Outcome: Progressing  Goal: Appropriate maternal -  bonding  Description: INTERVENTIONS:- Identify family support- Assess for appropriate maternal/infant bonding -Encourage maternal/infant bonding opportunities- Referral to  or  as needed  Outcome: Progressing  Goal: Establishment of infant feeding pattern  Description: INTERVENTIONS:- Assess breast/bottle feeding- Refer to lactation as needed  Outcome: Progressing  Goal: Incision(s), wounds(s) or drain site(s) healing without S/S of infection  Description: INTERVENTIONS- Assess and document dressing, incision, wound bed, drain sites and surrounding tissue- Provide patient and family education-  Outcome: Progressing

## 2025-04-10 NOTE — OB LABOR/OXYTOCIN SAFETY PROGRESS
Labor Progress Note - Steve Mahoney 25 y.o. female MRN: [id number]    Unit/Bed#: -01 Encounter: [id number]    Dose (jimmie-units/min) Oxytocin: 5 jimmie-units/min  Contraction Frequency (minutes): 1.5-2  Contraction Intensity: Mild  Uterine Activity Characteristics: Regular  Cervical Dilation: 6        Cervical Effacement: 70  Fetal Station: -2  Baseline Rate (FHR): 145 bpm  Fetal Heart Rate (FHT): 143 BPM  FHR Category: 120 baseline; intermittent variable decelerations, overall re-assuring given moderate variability and accelerations, will reposition with peanut ball in place and continue  to monitor closely as now active stage of labor            Vital Signs:   Vitals:    25 2250   BP: 118/61   Pulse: 88   Resp:    Temp:    SpO2:      Notes/comments:       25 y.o.  at 40w5d admitted for IOL due to elevated BP in the setting of term gestation.      - Labor: S/p FB. On pitocin, currently 5mU/min, titrate per protocol. AROM with clear fluid at 2116. Next SVE in 2-3 hours or PRN for maternal/fetal indications   - FHT Cat I   - GBS negative   - Pain: Epidural in place, comfortable   - Elevated BP, hx Pre E in prior pregnancy: In office BP mild range, repeat here normotensive. PIH labs drawn and unremarkable. P/C elevated to 0.30. She remains asymptomatic. Continue  to monitor closely   - Short interval pregnancy: Can discuss contraception in postpartum period   - Hx VSD: S/p closure. Normal fetal echocardiogram  - Hx LSIL: Needs re-evaluation postpartum period   - Anemia: Hb on admission 10.8   - Obesity: BMI 35, SCD's ordered       Roger Dover MD 2025 11:06 PM

## 2025-04-10 NOTE — ANESTHESIA POSTPROCEDURE EVALUATION
Post-Op Assessment Note    CV Status:  Stable  Pain Score: 0    Pain management: adequate      Post-op block assessment: site cleaned, catheter intact and no complications   Mental Status:  Alert and awake   Hydration Status:  Euvolemic   PONV Controlled:  Controlled   Airway Patency:  Patent     Post Op Vitals Reviewed: Yes    No anethesia notable event occurred.    Staff: CRNA           Last Filed PACU Vitals:  Vitals Value Taken Time   Temp     Pulse     BP     Resp     SpO2

## 2025-04-11 VITALS
SYSTOLIC BLOOD PRESSURE: 125 MMHG | DIASTOLIC BLOOD PRESSURE: 58 MMHG | HEART RATE: 85 BPM | OXYGEN SATURATION: 98 % | BODY MASS INDEX: 35.61 KG/M2 | WEIGHT: 201 LBS | HEIGHT: 63 IN | RESPIRATION RATE: 18 BRPM | TEMPERATURE: 97.9 F

## 2025-04-11 LAB
ERYTHROCYTE [DISTWIDTH] IN BLOOD BY AUTOMATED COUNT: 16.9 % (ref 11.6–15.1)
HCT VFR BLD AUTO: 26.8 % (ref 34.8–46.1)
HGB BLD-MCNC: 8.2 G/DL (ref 11.5–15.4)
MCH RBC QN AUTO: 25.1 PG (ref 26.8–34.3)
MCHC RBC AUTO-ENTMCNC: 30.6 G/DL (ref 31.4–37.4)
MCV RBC AUTO: 82 FL (ref 82–98)
PLATELET # BLD AUTO: 168 THOUSANDS/UL (ref 149–390)
PMV BLD AUTO: 10.9 FL (ref 8.9–12.7)
RBC # BLD AUTO: 3.27 MILLION/UL (ref 3.81–5.12)
WBC # BLD AUTO: 11.15 THOUSAND/UL (ref 4.31–10.16)

## 2025-04-11 PROCEDURE — 99024 POSTOP FOLLOW-UP VISIT: CPT | Performed by: ADVANCED PRACTICE MIDWIFE

## 2025-04-11 PROCEDURE — 85027 COMPLETE CBC AUTOMATED: CPT | Performed by: STUDENT IN AN ORGANIZED HEALTH CARE EDUCATION/TRAINING PROGRAM

## 2025-04-11 RX ADMIN — IBUPROFEN 600 MG: 600 TABLET ORAL at 14:32

## 2025-04-11 RX ADMIN — IBUPROFEN 600 MG: 600 TABLET ORAL at 08:10

## 2025-04-11 RX ADMIN — IRON SUCROSE 300 MG: 20 INJECTION, SOLUTION INTRAVENOUS at 12:03

## 2025-04-11 RX ADMIN — IBUPROFEN 600 MG: 600 TABLET ORAL at 03:02

## 2025-04-11 NOTE — PLAN OF CARE
Problem: POSTPARTUM  Goal: Experiences normal postpartum course  Description: INTERVENTIONS:- Monitor maternal vital signs- Assess uterine involution and lochia  Outcome: Progressing  Goal: Appropriate maternal -  bonding  Description: INTERVENTIONS:- Identify family support- Assess for appropriate maternal/infant bonding -Encourage maternal/infant bonding opportunities- Referral to  or  as needed  Outcome: Progressing  Goal: Establishment of infant feeding pattern  Description: INTERVENTIONS:- Assess breast/bottle feeding- Refer to lactation as needed  Outcome: Progressing  Goal: Incision(s), wounds(s) or drain site(s) healing without S/S of infection  Description: INTERVENTIONS- Assess and document dressing, incision, wound bed, drain sites and surrounding tissue- Provide patient and family education- Perform skin care  Outcome: Progressing

## 2025-04-11 NOTE — LACTATION NOTE
This note was copied from a baby's chart.  CONSULT - LACTATION  Baby Girl Mahoney (Jamie) 1 days female MRN: [id number]    [facility] NURSERY Room / Bed: (N)/(N) Encounter: [id number]    Maternal Information     MOTHER:  Steve Mahoney  Maternal Age: 25 y.o.  OB History: # 1 - Date: 11/10/23, Sex: Male, Weight: 2910 g (6 lb 6.7 oz), GA: 37w2d, Type: Vaginal, Spontaneous, Apgar1: 9, Apgar5: 9, Living: Living, Birth Comments: small laceration on scalp    # 2 - Date: 04/10/25, Sex: Female, Weight: 3830 g (8 lb 7.1 oz), GA: 40w6d, Type: Vaginal, Spontaneous, Apgar1: 7, Apgar5: 9, Living: Living, Birth Comments: None   Previous breast reduction surgery? No    Lactation history:   Has patient previously breast fed: Yes   How long had patient previously breast fed: about 8 months   Previous breast feeding complications: Exclusive pump and bottle fed     Past Surgical History:   Procedure Laterality Date    WISDOM TOOTH EXTRACTION         Birth information:  YOB: 2027   Time of birth: 2:13 AM   Sex: female   Delivery type: Vaginal, Spontaneous   Birth Weight: 3830 g (8 lb 7.1 oz)   Percent of Weight Change: -2%     Gestational Age: 40w6d   [unfilled]    Reason for Consult:    Reason for Consult: Follow-up assessment (ext) - 20 min, Latch Assess (ext) - 20 min    Risk Factors:         Breast and nipple assessment:   Breasts/Nipples  Left Breast: Soft  Right Breast: Soft  Left Nipple: Flat, Everted  Right Nipple: Flat, Everted  Intervention: Other (comment), Breast pump, Hand expression (helped with positioning & maintaining latch; encouraged Latch assessment)  Breastfeeding Status: Yes  Breastfeeding Progress: Not yet established    OB Lactation Tools:    Other OB Lactation Tools  Feeding Devices: Pump, Syringe    Breast Pump:    Breast Pump  Pump: Manual  Pump Review/Education: Milk storage  Initiated by: MM  Date Initiated: 04/10/25        Assessment: receded chin    Feeding assessment: latch difficulty (Dyad needing assistance at get started on left)    LATCH:  Latch: Repeated attempts, hold nipple in mouth, stimulate to suck (did better with time)   Audible Swallowing: A few with stimulation   Type of Nipple: Flat   Comfort (Breast/Nipple): Soft/non-tender   Hold (Positioning): Partial assist, teach one side, mother does other, staff holds   LATCH Score: 6       Denisseker:                   Feeding recommendations:  breast feed on demand       04/11/25 1100   Patient Follow-Up   Lactation Consult Status 2   Follow-Up Type Inpatient;Call as needed   Other OB Lactation Documentation    Additional Problem Noted Follow Up Visit with parents experienced with pump & feed; helped with positioning/maintaining deep latch with good suckling  (LER Handouts - Checklist essential for postioning & latch; Congratulations on your Baby!)       Review  of  positioning and alignment. Mom is encouraged to:     - Bring baby up to the breast (use of pillows to elevate so baby's torso is against mom's breasts)   - Skin to skin for feedings with top hand exposed to show signs of satiation   - Chin deep into breast tissue (make baby look up to the nipple)   - nose aligned to the nipple   -Wait for wide gape, place chin behind the areola on the breast so nipple is at the nose. Once baby opens their mouth wide, the nipple will be aimed at the upper, back palate  - Cheeks should be touching breast, and baby should have a long neck   - Ear, shoulder, hip will be in alignment   - Deep, snug hold of baby up to the breast   - Every baby knows how to breathe at the breast. The tip of the nose may appear to touch the breast. Babies breathe from the side of the nasal passages. If baby can not breathe due to improper alignment, baby will unlatch    Mom wanted assistance with Right breast, using football hold. Worked on positioning infant up at chest level and starting to feed infant  with nose arriving at the nipple. Then, using areolar compression to achieve a deep latch that is comfortable and exchanges optimum amounts of milk. Hand over hand guidance to deep latch. Breast compressions and stimulation to support active suckling till popped off with relaxed tone. Burped. Then to left breast also using football hold till asleep at breast.     Encouraged parents to call for assistance, questions, and concerns about breastfeeding.       Palak Esqueda RN 4/11/2025 3:05 PM

## 2025-04-11 NOTE — DISCHARGE SUMMARY
Discharge Summary - Steve Mahoney 25 y.o. female MRN: [id number]    Unit/Bed#: -01 Encounter: [id number]    ADMISSION  Admission Date: 2025   Admitting Attending: Dr. Roger Dover MD  Patient of: [facility]  Admitting Diagnoses:   Patient Active Problem List   Diagnosis    Allergic rhinitis    LGSIL of cervix of undetermined significance    Maternal congenital heart disease, antepartum    Elevated BP without diagnosis of hypertension    Short interval between pregnancies affecting pregnancy, antepartum    History of postpartum hemorrhage, currently pregnant    40 weeks gestation of pregnancy    Hx of preeclampsia, prior pregnancy, currently pregnant    Obesity (BMI 35.0-39.9 without comorbidity)       DELIVERY  Delivery Method:    Delivery Date and Time: 4/10/2025 2:13 AM  Delivery Attending: Dr. Dover    DISCHARGE  Discharge Date: 2025  Discharge Attending: Dr. Malin  Discharge Diagnosis:   Same, Delivered  Anemia  Clinical course: Admission to Delivery  Steve Mahoney is a 25 y.o.  who was admitted at 40w6d on *Date of admission* secondary to induction of labor secondary to elevated BP in the setting of term gestation.  Labs WNL.  Bps remained normotensive.        Delivery  Route of Delivery: Vaginal, Spontaneous  Reason for  Delivery: none  Anesthesia: Epidural,   QBL: Non-Surgical QBL (mL): 32        Laceration: 2nd degree laceration    Delivery: Vaginal, Spontaneous at 4/10/2025 2:13 AM  Laceration: Perineal: 2° Repaired? Yes    Baby's Weight: 3830 g (8 lb 7.1 oz); 135.1    Apgar scores: 7  and 9  at 1 and 5 minutes, respectively      Clinical Course: Post-Delivery:  The post delivery course was remarkable for anemia, asymptomatic, venofer ordered    Day of discharge  Patient was seen today for a routine postpartum visit.  Breast feeding without difficulty.  Lactation consultant to see patient.    Patient states her pain is well managed with meds.  She is  eating well and drinking well.  Denies N/V.  + void.   Patient denies history of postpartum depression/anxiety.  The patient was ambulating, and hemodynamically stable. She was able to reasonably perform all ADLs.       Exam   V/S:   Vitals:    25 0804   BP: 125/58   Pulse: 85   Resp: 18   Temp: 97.9 °F (36.6 °C)   SpO2: 98%     Lungs: clear, bilaterally, RRR  Breasts: soft, non-tender, and no cracking of nipples   Abdomen: soft, NT, fundus firm, and @ umbilicus   Perineum: 2nd degree laceration, well approximated  scant  Lower extremities: edema, mild bilaterally      Labs:  Lab Results   Component Value Date    WBC 11.15 (H) 2025    HGB 8.2 (L) 2025    HCT 26.8 (L) 2025    MCV 82 2025     2025     Rubella: immune  Blood type: O+      Assessment/Plan: Postpartum Day # 2, s/p    Breast feeding  pre-eclampsia, history of in previous pregnancy  Postpartum hemorrhage, history of in previous pregnancy    Venofer ordered  Discharge instructions reviewed with patient, Lactation support , Reviewed s/s of DVT , Reviewed s/s of PPD/anxiety , Reviewed s/s of pre-eclampsia , Discharge to home with instructions, Birth control options discussed, and Contraceptive plans: undecided at this time.      Follow up at [facility]  in 3 weeks for Routine Postpartum Visit  Patient was instructed to follow up with her OB as an outpatient and was given appropriate warnings to call her provider with problems or concerns.    Pertinent lab findings included:       Last three Hgb values:  Lab Results   Component Value Date    HGB 8.2 (L) 2025    HGB 10.8 (L) 2025    HGB 12.0 2025        Problem-specific follow-up plans included the following:  Problem List          Cardiovascular and Mediastinum    Maternal congenital heart disease, antepartum    Overview   Hx maternal VSD, spontaneously closed.    pregnancy: Fetal echocardiogram normal            Respiratory     Allergic rhinitis       Genitourinary    LGSIL of cervix of undetermined significance    Overview   2023            Obstetrics/Gynecology    Short interval between pregnancies affecting pregnancy, antepartum    Overview   Third trimester growth assessments are advised per MFM          History of postpartum hemorrhage, currently pregnant    Overview   Hx PPH and transfusion with last delivery          40 weeks gestation of pregnancy    Overview   TDAP @ 1/20/25         Hx of preeclampsia, prior pregnancy, currently pregnant    Overview   Baseline PIH labs unremarkable               Other    * (Principal) Elevated BP without diagnosis of hypertension    Obesity (BMI 35.0-39.9 without comorbidity)        Discharge med list:  Contraception: undecided       Medication List      CONTINUE taking these medications     PRENATAL VITAMINS PO     STOP taking these medications     calcium carbonate 500 mg chewable tablet; Commonly known as: TUMS   docusate sodium 100 mg capsule; Commonly known as: COLACE   famotidine 10 mg tablet; Commonly known as: PEPCID       Condition at discharge:   stable     Disposition:   Home    Planned Readmission:   No      30 minutes were spent reviewing chart, labs, and coordinating care and discharge planning    Michaela De Jesus CNM  4/11/2025  11:18 AM

## 2025-04-11 NOTE — PLAN OF CARE
Problem: POSTPARTUM  Goal: Experiences normal postpartum course  Description: INTERVENTIONS:- Monitor maternal vital signs- Assess uterine involution and lochia  Outcome: Adequate for Discharge  Goal: Appropriate maternal -  bonding  Description: INTERVENTIONS:- Identify family support- Assess for appropriate maternal/infant bonding -Encourage maternal/infant bonding opportunities- Referral to  or  as needed  Outcome: Adequate for Discharge  Goal: Establishment of infant feeding pattern  Description: INTERVENTIONS:- Assess breast/bottle feeding- Refer to lactation as needed  Outcome: Adequate for Discharge  Goal: Incision(s), wounds(s) or drain site(s) healing without S/S of infection  Description: INTERVENTIONS- Assess and document dressing, incision, wound bed, drain sites and surrounding tissue- Provide patient and family education- Perform skin care/dressing changes every   Outcome: Adequate for Discharge

## 2025-04-14 ENCOUNTER — OFFICE VISIT (OUTPATIENT)
Dept: POSTPARTUM | Facility: CLINIC | Age: 25
End: 2025-04-14
Payer: COMMERCIAL

## 2025-04-14 PROCEDURE — 99404 PREV MED CNSL INDIV APPRX 60: CPT | Performed by: PEDIATRICS

## 2025-04-14 RX ORDER — DOCUSATE SODIUM 100 MG/1
100 CAPSULE, LIQUID FILLED ORAL DAILY
COMMUNITY

## 2025-04-14 NOTE — PROGRESS NOTES
INITIAL BREAST FEEDING EVALUATION    Informant/Relationship: Steve & Calvin    Discussion of General Lactation Issues: Steve does not believe Leticia has a deep enough latch. Steve reports each latch causes nipple pain on both sides. Reports pain is worse for the first 30-60 seconds after Leticia latches and it becomes more mild.     Leticia is 4 days old today.        History:  Fertility Problem:no  Breast changes:yes - Towards the end breasts felt more firm, had darkening of areolas  : yes - Induction d/t date and hypertension  Full term:yes - 40 weeks and 6 days   labor:no  First nursing/attempt < 1 hour after birth:yes - Nursing attempt in delivery room  Skin to skin following delivery:yes - In delivery room  Breast changes after delivery:yes - Between 2-3 days postpartum  Rooming in (infant in room with mother with exception of procedures, eg. Circumcision): yes - For duration  Blood sugar issues:no  NICU stay:no  Jaundice:no  Phototherapy:no  Supplement given: (list supplement and method used as well as reason(s):no    Past Medical History:   Diagnosis Date    Abnormal Pap smear of cervix     LGSIL on pap 3/2023    Allergic     History of transfusion     iron infusion post delivery    Hypertension     pre-eclampsia last preg - Procardia    Impacted teeth     Resolved 10/12/2017     Maternal anemia with delivery, with current postpartum complication 2023    Admission Hgb 10.5. 3 hours PP 8.2 and PPD#1 5.9. 2 UPRBC given      Varicella     vaccinated    Ventricular septal defect          Current Outpatient Medications:     Prenatal Vit-Fe Fumarate-FA (PRENATAL VITAMINS PO), Take by mouth, Disp: , Rfl:     Allergies   Allergen Reactions    Azithromycin Rash       Social History     Substance and Sexual Activity   Drug Use Not Currently       Social History     Interval Breastfeeding History:    Frequency of breast feeding: Every 2 hours during the day and every 3 hours at night  Does mother  feel breastfeeding is effective: No, Steve is concerned about weight and jaundice.   Does infant appear satisfied after nursing: No  Stooling pattern normal: Yes- 1 per day  Urinating frequently:Yes  Using shield or shells: No    Alternative/Artificial Feedings:   In the past 24 hours Leticia has not fed at the breast and has been exclusively getting bottles d/t Steve's pain.   Bottle: Yes, Spectra base nipple size not utilizing paced bottle feeding  Cup: No  Syringe/Finger: No           Formula Type: Similac 360 total care ready made                     Amount: 2oz with each feeding            Breast Milk:                      Amount: 4oz in past 24 hours            Frequency Q every 2 hr during the day, and at night 3 Hr between feedings  Elimination Problems: No      Equipment:  Nipple Shield             Type: NA             Size: NA             Frequency of Use: NA  Pump            Type: Spectra S1            Frequency of Use: Every 3 hours in the past 24-36 hours   Expressing 0.5oz total with each pumping session  Shells            Type: NA            Frequency of use: NA    Equipment Problems: no    Mom:  Breast: Smaller shaped breasts with space between them, feel full.   Nipple Assessment in General: Normal: elongated/eraser, no discoloration and no damage noted.  Mother's Awareness of Feeding Cues                 Recognizes: Yes                  Verbalizes: Yes  Support System: FOB  History of Breastfeeding: First child had recessed chin and did not latch in the hospital. She sought out outpt lactation twice but did not have success with latching. Steve pumped and bottle fed for 9 months, and needed to supplement beginning around 5 months.   Changes/Stressors/Violence: Nipple pain  Concerns/Goals: Steve would like to exclusively breastfeed Leticia and minimize the amount of pumping and eliminate pain with latching.     Problems with Mom: Nipple pain with latch    Physical Exam  Pulmonary:      Effort:  Pulmonary effort is normal.   Neurological:      General: No focal deficit present.      Mental Status: She is alert.   Psychiatric:         Mood and Affect: Mood normal.         Behavior: Behavior normal.         Thought Content: Thought content normal.         Judgment: Judgment normal.         Infant:  Behaviors: Alert  Color: Pink  Birth weight: 3830 grams  Current weight: 3720    Problems with infant: Shallow latches, has not mastered sucking      General Appearance:  Alert, active, no distress                            Head:  Normocephalic, AFOF, sutures opposed                            Eyes:   Conjunctiva clear, no drainage                            Ears:   Normally placed, no anomolies                           Nose:   Septum intact, no drainage or erythema                          Mouth:  No lesions                   Neck:  Supple, symmetrical, trachea midline, no adenopathy; thyroid: no enlargement, symmetric, no tenderness/mass/nodules                Respiratory:  No grunting, flaring, retractions, breath sounds clear and equal           Cardiovascular:  Regular rate and rhythm. No murmur. Adequate perfusion/capillary refill. Femoral pulse present                  Abdomen:    Soft, non-tender, no masses, bowel sounds present, no HSM            Genitourinary:  Normal female genitalia, anus patent                         Spine:   No abnormalities noted       Musculoskeletal:   Full range of motion         Skin/Hair/Nails:   Skin warm, dry, and intact, no rashes or abnormal dyspigmentation or lesions               Neurologic:   No abnormal movement, tone appropriate for gestational age    Zena Assessment for Lingual Frenulum Function    Appearance Items Function Items   Appearance of tongue when lifted  1: Slight cleft in tip apparent   Lateralization  1: Body of tongue but not tongue tip   Elasticity of frenulum  1: Moderately elastic   Lift of tongue  2: Tip to mid-mouth     Length of lingual  "frenulum when tongue lifted  lingual frenulum length: 1: 1 cm     Extension of tongue  1: Tip over lower gum only   Attachment of lingual frenulum to tongue  2: Posterior to tip   Spread of anterior tongue  1: Moderate of partial   Attachment of lingual frenulum to inferior alveolar ridge  2: Attached to floor of mouth or well below ridge Cupping  2: Entire edge, firm cup   Ankyloglossia Grading:  Class I: mild, 12-16 mm  Class II: moderate, 8-11 mm  Class III: severe, 3-7 mm  ClassIV: complete, less than 3 mm Peristalsis  1: Partial, originating posterior to tip       SCORE:    Appearance: 7 (<8=ankyloglossia)  Function: 10 (<11=ankyloglossia) Snapback  2: None        [place] Latch:  Efficiency:               Lips Flanged: Yes              Depth of latch: Deep              Audible Swallow: Yes              Visible Milk: Yes              Wide Open/ Asymmetrical: Yes              Suck Swallow Cycle: Breathing: Unlabored, Coordinated: Yes  Nipple Assessment after latch: Normal: elongated/eraser, no discoloration and no damage noted on left breast, right breast had compression strip after latch.  Latch Problems: Shallow latch    Steve brought Leticia to right breast in football hold and was moving her breast to Leticia's mouth for alignment. Reviewed bringing Leticia to the breast with chin touching breast and nipple aligned with upper lip. Reviewed creating a sandwich to allow for more breast in Leticia's mouth to assist with deeper latch. Leticia obtained deep latch with upper lip flanged out and breast scoop to flange out bottom lip. Steve reports about 10 seconds of pain and then it eases to a \"tolerable discomfort\". Reviewed breast compressions during latch.     Steve brought Leticia to a more biological nursing position on the left breast but was uncomfortable here and repositioned to football hold. I provided verbal education on hold. Leticia demonstrated wide gape and Steve pulled away in anticipation. Reviewed to hug " "Leticia in to the breast when she demonstrates gape. Leticia then obtained deep latch with almost all of the areola in her mouth and both lips flanged out. Steve reports this latch to have minimal discomfort. Once Leticia was done on this side, Steve unlatched her and the left nipple was round, elongated with no damage noted.     Steve then brought Leticia skin to skin. Reviewed Leticia could have up to 4 breasts per feeding. Leticia was still demonstrating hunger cues and crawled to the right breast in a biological nursing position. Minimal assistance was needed to assist Steve to obtain latch in this position and Leticia obtained deep latch with both lips flanged out that Steve reports was more comfortable than previous latches.     Position:  Infant's Ergonomics/Body               Body Alignment: Yes, after adjustment               Head Supported: Yes               Close to Mom's body/ Lifted/ Supported: Yes               Mom's Ergonomics/Body: Yes, After adjustment                           Supported: Yes                           Sitting Back: Yes, Yes, after adjustment                           Brings Baby to her breast: Yes, after adjustment  Positioning Problems: Bringing breast to Leticia      Handouts:   Paced bottle feeding, Hand expression, and Latch Check List    Education:  Reviewed Latch: Reviewed that most of the areola should be in Leticia's mouth and should have asymmetric appearance.   Reviewed Positioning for Dyad: Reviewed bringing Leticia to the breast instead of breast to Leticia.   Reviewed Infant:Cues and varied States of Awareness  Reviewed Infant Elimination: No concerns  Reviewed Alternative/Artificial Feedings: Reviewed paced bottle feeding  Reviewed Mom/Breast care: Reviewed  Reviewed Equipment: Reviewed cycles of Spectra breast pump      Plan:  Plan for breastfeeding    Reassurance and support given.    Demonstrated \"baby-led\" latch  Demonstrated ways to hold breast to facilitate latch: simple " "lift or \"sandwich\" v \"taco\"  Discussed difference in sensation of non-nutritive v nutritive sucking  I have reviewed paced bottle feeding technique.   I have encouraged Steve to bring Leticia to the breast for feedings offering up to 4 breasts with each feeding and observe for signs of satiety vs hunger and she may continue to offer bottles after feedings at the breast if she feels Leticia is still hungry.   We reviewed pump cycling of Spectra breast pump.   I have recommended follow up in 1 week to assess nipple pain and latch.         I have spent 90 minutes with Patient and family today in which greater than 50% of this time was spent in counseling/coordination of care regarding Patient and family education, Documenting in the medical record, and Obtaining or reviewing history  .         "

## 2025-04-14 NOTE — PATIENT INSTRUCTIONS
"Continue to feed Leticia on demand not to exceed three hours. Bring Leticia to the breast as often as you are comfortable with. Work on the biological positioning and offering the \"sandwich\" as discussed. Letiica may take up to 4 breasts per feed. If she still is demonstrating hunger cues you may offer a bottle of expressed milk or formula.   Utilize paced bottle feeding method as demonstrated.   Work on skin to skin as you are able.     Review this video for tips at deep latching. Attaching Your Baby at the Breast - Video - [employer]    If Leticia obtains shallow latch, un-latch and try again.   Utilize pump if there will be a missed feeding at the breast.     Reach out via SAVO or call the office if you have any questions between now and your follow up on 4/21!   "

## 2025-04-15 ENCOUNTER — TELEPHONE (OUTPATIENT)
Dept: OBGYN CLINIC | Facility: CLINIC | Age: 25
End: 2025-04-15

## 2025-04-15 NOTE — TELEPHONE ENCOUNTER
POSTPARTUM PHONE CALL ASSESSMENT - left message patient's VM & MyChart message sent to patient 4/15/2025    Date of Delivery: 4/10/2025  Delivering Provider: Dr Roger Dover  Mode:   Delivery Notes/Complications: IOL (40 wk 6 days), wrapped cord (trunk,neck), anemia  Do you still have bleeding/pain? If so, how much/how severe?     Regular BMs/Urination?     Breastfeeding/Formula/Both?     How are you doing emotionally?        Do you have any other questions or concerns for us or your provider?     Have you scheduled the pediatrician appointment with pediatrician?     Do you have a postpartum visit scheduled? no

## 2025-04-15 NOTE — PROGRESS NOTES
I have reviewed the notes, assessments, and/or procedures performed by Nona Sutton RN, CLC, I concur with her/his documentation of Steve Bourgeois MD 04/15/25

## 2025-04-18 ENCOUNTER — RESULTS FOLLOW-UP (OUTPATIENT)
Dept: OBGYN CLINIC | Facility: CLINIC | Age: 25
End: 2025-04-18

## 2025-04-18 LAB — PLACENTA IN STORAGE: NORMAL

## 2025-04-21 ENCOUNTER — OFFICE VISIT (OUTPATIENT)
Dept: POSTPARTUM | Facility: CLINIC | Age: 25
End: 2025-04-21
Payer: COMMERCIAL

## 2025-04-21 PROCEDURE — 99404 PREV MED CNSL INDIV APPRX 60: CPT | Performed by: PEDIATRICS

## 2025-04-21 NOTE — PROGRESS NOTES
BREAST FEEDING FOLLOW UP VISIT    Informant/Relationship: Steve    Discussion of General Lactation Issues: Steve's last attempt to latch Leticia was on Tuesday. At this time, she would like to take a break from feedings at the breast. Her current plan is to slowly discontinue pumping and begin to exclusively bottle feed formula to Leticia.     Leticia is 9 old today.    Interval Breastfeeding History:    Frequency of breast feeding: Last attempt, Tuesday  Does mother feel breastfeeding is effective: Leticia is not feeding at the breast  Does infant appear satisfied after nursing:If no, explain: Infant is not feeding at the breast  Stooling pattern normal:Yes  Urinating frequently:Yes  Using shield or shells:No    Alternative/Artificial Feedings:   Bottle: No  Cup: No  Syringe/Finger: No           Formula Type: Similac 360 powder, 2-3 bottles overnight                     Amount: 3 oz            Breast Milk:                      Amount: 3 oz            Frequency Q 3-4 Hr between feedings  Elimination Problems: No      Equipment:  Nipple Shield             Type: NA             Size: NA             Frequency of Use: NA  Pump            Type: Spectra            Frequency of Use: Every 3-4 hours around the clock, Obtaining 2-3oz with each pumping session  Shells            Type: NA            Frequency of use: NA    Equipment Problems: no      Mom:  Breast: Not performed today  Nipple Assessment in General: Not performed today, Steve denies wounds or pain  Mother's Awareness of Feeding Cues                 Recognizes: Yes                  Verbalizes: Yes  Support System: FOB  History of Breastfeeding: Steve exclusively pumped for first child.   Changes/Stressors/Violence: Steve reports feeding at the breast has become more challenging due to schedule and care of other child.   Concerns/Goals: Steve would like to take a break from feedings at the breast and slowly discontinue pumping at this time.     Problems with Mom: None  reported at this time    Physical Exam  Constitutional:       Appearance: Normal appearance. She is normal weight.   Pulmonary:      Effort: Pulmonary effort is normal.   Neurological:      General: No focal deficit present.      Mental Status: She is alert and oriented to person, place, and time.   Psychiatric:         Mood and Affect: Mood normal.         Behavior: Behavior normal.         Thought Content: Thought content normal.         Judgment: Judgment normal.         Infant:  Behaviors: Alert  Color: Pink  Birth weight: 3830 grams  Current weight: 3935 grams    Problems with infant: Leticia       General Appearance:  Alert, active, no distress                            Head:  Normocephalic, AFOF, sutures opposed                            Eyes:   Conjunctiva clear, no drainage                            Ears:   Normally placed, no anomolies                           Nose:   Septum intact, no drainage or erythema                          Mouth:  No lesions, Leticia sticks tongue out past lower alveolar ridge but not out past bottom lip. She can lateralize bilaterally but fasciculation noted when lateralizing to the right side. She has high narrow pallet and gags easily on gloved finger. There is peristalic movement of the tongue felt, however after a few sucks she pulls the tongue back and compresses my finger between her jaws. The lingual frenulum is noted to be thin and posterior to the tip of the tongue and attaching well below the lower alveolar ridge during manual lift.                    Neck:  Supple, symmetrical, trachea midline, no adenopathy; thyroid: no enlargement, symmetric, no tenderness/mass/nodules                Respiratory:  No grunting, flaring, retractions, breath sounds clear and equal           Cardiovascular:  Regular rate and rhythm. No murmur. Adequate perfusion/capillary refill. Femoral pulse present                  Abdomen:    Soft, non-tender, no masses, bowel sounds present, no  HSM            Genitourinary:  Normal female genitalia, anus patent                         Spine:   No abnormalities noted       Musculoskeletal:   Full range of motion         Skin/Hair/Nails:   Skin warm, dry, and intact, no rashes or abnormal dyspigmentation or lesions               Neurologic:   No abnormal movement, tone appropriate for gestational age    [place] Assessment for Lingual Frenulum Function    Appearance Items Function Items   Appearance of tongue when lifted  2: Round or square   Lateralization  1: Body of tongue but not tongue tip   Elasticity of frenulum  2: Very elastic   Lift of tongue  1: Only edges to mid-mouth     Length of lingual frenulum when tongue lifted  lingual frenulum length: 1: 1 cm     Extension of tongue  1: Tip over lower gum only   Attachment of lingual frenulum to tongue  2: Posterior to tip   Spread of anterior tongue  1: Moderate of partial   Attachment of lingual frenulum to inferior alveolar ridge  2: Attached to floor of mouth or well below ridge Cupping  1: Side edges only, moderate cup   Ankyloglossia Grading:  Class I: mild, 12-16 mm  Class II: moderate, 8-11 mm  Class III: severe, 3-7 mm  ClassIV: complete, less than 3 mm Peristalsis  1: Partial, originating posterior to tip       SCORE:    Appearance: 9 (<8=ankyloglossia)  Function: 7 (<11=ankyloglossia) Snapback  1: Periodic        [place] Latch on bottle:  Efficiency:               Lips Flanged: Yes              Depth of latch: Deeply              Audible Swallow: Yes              Wide Open: Yes              Suck Swallow Cycle: Breathing: Unlabored, Coordinated: Yes  Nipple Assessment after latch: N/A  Latch Problems: None one bottle    Position while bottle feeding:  Infant's Ergonomics/Body               Body Alignment: Yes               Head Supported: Yes               Close to body/ Lifted/ Supported: Yes               Ergonomics/Body: Yes                           Supported: Yes                            Sitting Back: Yes                           Brings Baby to her breast: N/A  Positioning Problems: None at this time.      Handouts:   None    Education:  Reviewed Frequency/Supply & Demand: Reviewed how to wean from pumping  Reviewed Infant:Cues and varied States of Awareness  Reviewed Alternative/Artificial Feedings: Reviewed paced bottle feeding.   Reviewed Mom/Breast care: Reviewed use of cold compresses and ibuprofen as needed during discontinuation of pumping.         Plan:    Plan for breastfeeding    Reassurance and support given.    I have encouraged Steve to continue to feed Leticia on demand, and she may go longer stretches overnight.   I've reviewed utilizing paced bottle feeding method.   We discussed continue use of tummy time on firm surfaces multiple times per day.   I have encouraged Steve and Leticia to continue to perform skin to skin.     We have discussed how to wean from pumping by decreasing the current amount of time she pumps at each session by a few minutes. She can also try going longer stretches between pumping sessions until she is able to eliminate a session completely.       I have spent 60 minutes with Patient and family today in which greater than 50% of this time was spent in counseling/coordination of care regarding Patient and family education, Documenting in the medical record, and Obtaining or reviewing history

## 2025-04-21 NOTE — PATIENT INSTRUCTIONS
Continue to feed Leticia on demand.   Leticia has exceeded her birth weight so you can allow her to go longer stretches overnight.     Continue to utilize paced bottle feeding method.   Continue to incorporate tummy time on firm surfaces multiple times per day.     Continue performing skin to skin.     When you are ready, you can slowly decrease pumping sessions/times as discussed. Begin by decreasing the current amount of time you pump at each session by a few minutes. You can also try going longer stretcher between pumping sessions until you are able to eliminate a session completely.    You can use cold compresses and/or ibuprofen for discomfort.     If you have any challenges with decreasing pumping, feel free to reach out with questions!     If Leticia has any challenges with the bottle or you have any questions reach out to the office!

## 2025-04-27 NOTE — PROGRESS NOTES
I have reviewed the notes, assessments, and/or procedures performed by Nona Sutton RN, [facility],, I concur with her/his documentation of Steve Bourgeois MD 04/27/25

## 2025-05-02 ENCOUNTER — POSTPARTUM VISIT (OUTPATIENT)
Dept: OBGYN CLINIC | Facility: CLINIC | Age: 25
End: 2025-05-02

## 2025-05-02 VITALS
SYSTOLIC BLOOD PRESSURE: 114 MMHG | BODY MASS INDEX: 31.36 KG/M2 | HEIGHT: 63 IN | DIASTOLIC BLOOD PRESSURE: 62 MMHG | WEIGHT: 177 LBS

## 2025-05-02 PROCEDURE — 99024 POSTOP FOLLOW-UP VISIT: CPT | Performed by: STUDENT IN AN ORGANIZED HEALTH CARE EDUCATION/TRAINING PROGRAM

## 2025-05-02 NOTE — PROGRESS NOTES
[facility] - [place]  [address] [place] [address]    Assessment/Plan:  Steve is a 25 y.o. year old  who presents for postpartum visit.    Routine Postpartum Care  Normal postpartum exam  Contraception: condoms for now   Depression Screen: low  Feeding: breastfeeding   Cervical cancer screening: hx LSIL in ; declined colposcopy at that time; had repeat pap smear in  which was negative, recommend repeat this year to ensure normal results and then can be spaced out     Follow up in: 3-6 months   Assessment & Plan   (spontaneous vaginal delivery)  Exam benign   Laceration healing appropriately   Will call about OCP's when she is ready        Subjective:     CC: Postpartum visit    Steve Mahoney is a 25 y.o. y.o. female  who presents for a postpartum visit.     Patient was admitted after elevated BP's noted at her routine prenatal visit. Given term gestation we discussed proceeding with IOL to which she was amenable. IOL was uncomplicated. She underwent  with repair of a 2nd degree perineal laceration. Given patient's prior history of postpartum hemorrhage and transfusion 1000 mcg of rectal Cytotec were placed at the time of the delivery. She then had increased bleeding in postpartum period and received TXA and Hemabate. Total  cc. Hb trend 10.8>8.2. She did not meet criteria for any hypertensive disease. She was discharged home in stable condition.     Postpartum course has been otherwise unremarkable.     Bleeding thin lochia. Bowel function is normal. Bladder function is normal. Patient is not sexually active.     Postpartum Depression: Low Risk  (2025)    Shiner  Depression Scale     Last EPDS Total Score: 4     Last EPDS Self Harm Result: Never   Recent Concern: Postpartum Depression - Medium Risk (2025)    Shiner  Depression Scale     Last EPDS Total Score: 5     Last EPDS Self Harm Result: Never     The following portions of  "the patient's history were reviewed and updated as appropriate: allergies, current medications, past family history, past medical history, obstetric history, gynecologic history, past social history, past surgical history and problem list.    Objective:  /62 (BP Location: Left arm, Patient Position: Sitting, Cuff Size: Standard)   Ht 5' 3\" (1.6 m)   Wt 80.3 kg (177 lb)   LMP 2024 (Exact Date)   Breastfeeding Yes   BMI 31.35 kg/m²   Pregravid Weight/BMI: 77.1 kg (170 lb) (BMI 30.12)  Current Weight: 80.3 kg (177 lb)   Total Weight Gain: 14.1 kg (31 lb)   Pre- Vitals      Flowsheet Row Most Recent Value   Prenatal Assessment    Prenatal Vitals    Blood Pressure 114/62   Weight - Scale 80.3 kg (177 lb)   Urine Albumin/Glucose    Dilation/Effacement/Station    Vaginal Drainage    Edema            General Appearance: alert and oriented, in no acute distress.   Abdomen: Soft, non-tender, non-distended, no masses, no rebound or guarding.  Pelvic:       External genitalia: Normal appearance, no abnormal pigmentation, no lesions or masses. Normal Bartholin's and Poulan's. Obstetric laceration well-healed.       Urinary system: Urethral meatus normal, bladder non-tender.      Vaginal: normal mucosa without prolapse or lesions. Normal-appearing physiologic discharge.      Cervix: Normal-appearing, well-epithelialized, no gross lesions or masses. No cervical motion tenderness.      Adnexa: No adnexal masses or tenderness noted.      Uterus: Normal-sized, regular contour, midline, mobile, no uterine tenderness.      Rectovaginal: Normal sphincter tone, no nodules or masses.   Extremities: Normal range of motion.   Skin: normal, no rash or abnormalities  Neurologic: alert, oriented x3  Psychiatric: Appropriate affect, mood stable, cooperative with exam.        Roger Dover MD  2025 11:47 AM   "

## 2025-05-07 ENCOUNTER — HOSPITAL ENCOUNTER (OUTPATIENT)
Dept: NON INVASIVE DIAGNOSTICS | Facility: HOSPITAL | Age: 25
Discharge: HOME | End: 2025-05-07
Payer: COMMERCIAL

## 2025-05-07 DIAGNOSIS — R00.2 PALPITATIONS: ICD-10-CM

## 2025-05-07 PROCEDURE — 93225 XTRNL ECG REC<48 HRS REC: CPT

## 2025-05-07 PROCEDURE — 93226 XTRNL ECG REC<48 HR SCAN A/R: CPT

## 2025-06-18 ENCOUNTER — RESULTS FOLLOW-UP (OUTPATIENT)
Dept: NON INVASIVE DIAGNOSTICS | Facility: HOSPITAL | Age: 25
End: 2025-06-18

## 2025-06-26 ENCOUNTER — TELEPHONE (OUTPATIENT)
Age: 25
End: 2025-06-26

## 2025-06-26 NOTE — TELEPHONE ENCOUNTER
Patient calling for a RTW letter for tomorrow 6/27/25. She just got the call from her job that she needs this letter for tomorrow before work. Patient states she will come in to . I did offer mychart but patient declined.